# Patient Record
Sex: FEMALE | Race: AMERICAN INDIAN OR ALASKA NATIVE | Employment: OTHER | URBAN - METROPOLITAN AREA
[De-identification: names, ages, dates, MRNs, and addresses within clinical notes are randomized per-mention and may not be internally consistent; named-entity substitution may affect disease eponyms.]

---

## 2017-01-23 ENCOUNTER — HOSPITAL ENCOUNTER (OUTPATIENT)
Dept: CT IMAGING | Age: 72
Discharge: HOME | End: 2017-01-23
Attending: OBSTETRICS & GYNECOLOGY
Payer: MEDICARE

## 2017-01-23 ENCOUNTER — HOSPITAL ENCOUNTER (OUTPATIENT)
Dept: CT IMAGING | Age: 72
Discharge: HOME | End: 2017-01-23
Attending: INTERNAL MEDICINE
Payer: MEDICARE

## 2017-01-23 DIAGNOSIS — C51.9 VULVAR CANCER, CARCINOMA (HCC): ICD-10-CM

## 2017-01-23 DIAGNOSIS — Z12.2 ENCOUNTER FOR SCREENING FOR LUNG CANCER: ICD-10-CM

## 2017-01-23 LAB — CREAT BLD-MCNC: 0.8 MG/DL (ref 0.6–1)

## 2017-01-23 PROCEDURE — G0297 LDCT FOR LUNG CA SCREEN: HCPCS

## 2017-01-23 PROCEDURE — 74011000258 HC RX REV CODE- 258: Performed by: OBSTETRICS & GYNECOLOGY

## 2017-01-23 PROCEDURE — 74177 CT ABD & PELVIS W/CONTRAST: CPT

## 2017-01-23 PROCEDURE — 74011636320 HC RX REV CODE- 636/320: Performed by: OBSTETRICS & GYNECOLOGY

## 2017-01-23 PROCEDURE — 82565 ASSAY OF CREATININE: CPT

## 2017-01-23 RX ORDER — SODIUM CHLORIDE 0.9 % (FLUSH) 0.9 %
10 SYRINGE (ML) INJECTION
Status: COMPLETED | OUTPATIENT
Start: 2017-01-23 | End: 2017-01-23

## 2017-01-23 RX ADMIN — DIATRIZOATE MEGLUMINE AND DIATRIZOATE SODIUM 15 ML: 660; 100 LIQUID ORAL; RECTAL at 11:08

## 2017-01-23 RX ADMIN — Medication 10 ML: at 11:08

## 2017-01-23 RX ADMIN — SODIUM CHLORIDE 100 ML: 900 INJECTION, SOLUTION INTRAVENOUS at 11:08

## 2017-01-23 RX ADMIN — IOPAMIDOL 100 ML: 755 INJECTION, SOLUTION INTRAVENOUS at 11:08

## 2017-10-04 PROBLEM — E11.42 DIABETIC POLYNEUROPATHY ASSOCIATED WITH TYPE 2 DIABETES MELLITUS (HCC): Status: ACTIVE | Noted: 2017-10-04

## 2018-01-26 ENCOUNTER — HOSPITAL ENCOUNTER (OUTPATIENT)
Dept: MAMMOGRAPHY | Age: 73
Discharge: HOME | End: 2018-01-26
Attending: INTERNAL MEDICINE
Payer: MEDICARE

## 2018-01-26 DIAGNOSIS — Z78.0 MENOPAUSE: ICD-10-CM

## 2018-01-26 DIAGNOSIS — Z12.31 ENCOUNTER FOR SCREENING MAMMOGRAM FOR BREAST CANCER: ICD-10-CM

## 2018-01-26 PROCEDURE — 77067 SCR MAMMO BI INCL CAD: CPT

## 2018-01-26 PROCEDURE — 77080 DXA BONE DENSITY AXIAL: CPT

## 2018-02-23 ENCOUNTER — HOSPITAL ENCOUNTER (OUTPATIENT)
Dept: LAB | Age: 73
Discharge: HOME | End: 2018-02-23

## 2018-03-08 ENCOUNTER — HOSPITAL ENCOUNTER (OUTPATIENT)
Dept: CT IMAGING | Age: 73
Discharge: HOME | End: 2018-03-08
Attending: INTERNAL MEDICINE
Payer: MEDICARE

## 2018-03-08 VITALS — BODY MASS INDEX: 31.41 KG/M2 | WEIGHT: 160 LBS | HEIGHT: 60 IN

## 2018-03-08 DIAGNOSIS — Z12.2 ENCOUNTER FOR SCREENING FOR LUNG CANCER: ICD-10-CM

## 2018-03-08 PROCEDURE — G0297 LDCT FOR LUNG CA SCREEN: HCPCS

## 2018-06-20 PROBLEM — E66.9 CLASS 1 OBESITY WITH BODY MASS INDEX (BMI) OF 32.0 TO 32.9 IN ADULT: Status: ACTIVE | Noted: 2018-06-20

## 2018-10-26 ENCOUNTER — HOSPITAL ENCOUNTER (OUTPATIENT)
Dept: LAB | Age: 73
Discharge: HOME | End: 2018-10-26

## 2019-04-29 ENCOUNTER — HOSPITAL ENCOUNTER (OUTPATIENT)
Dept: CT IMAGING | Age: 74
Discharge: HOME | End: 2019-04-29
Attending: INTERNAL MEDICINE
Payer: MEDICARE

## 2019-04-29 VITALS — WEIGHT: 160 LBS | BODY MASS INDEX: 32.25 KG/M2 | HEIGHT: 59 IN

## 2019-04-29 DIAGNOSIS — F17.200 CURRENT SMOKER: ICD-10-CM

## 2019-04-29 PROCEDURE — G0297 LDCT FOR LUNG CA SCREEN: HCPCS

## 2019-05-16 PROBLEM — I65.21 CAROTID STENOSIS, RIGHT: Status: ACTIVE | Noted: 2019-05-16

## 2019-05-20 ENCOUNTER — HOSPITAL ENCOUNTER (OUTPATIENT)
Dept: CT IMAGING | Age: 74
Discharge: HOME | End: 2019-05-20
Attending: SURGERY
Payer: MEDICARE

## 2019-05-20 DIAGNOSIS — I72.0 CAROTID ANEURYSM, RIGHT (HCC): ICD-10-CM

## 2019-05-20 LAB — CREAT BLD-MCNC: 0.7 MG/DL (ref 0.8–1.5)

## 2019-05-20 PROCEDURE — 74011636320 HC RX REV CODE- 636/320: Performed by: SURGERY

## 2019-05-20 PROCEDURE — 70498 CT ANGIOGRAPHY NECK: CPT

## 2019-05-20 PROCEDURE — 74011000258 HC RX REV CODE- 258: Performed by: SURGERY

## 2019-05-20 PROCEDURE — 82565 ASSAY OF CREATININE: CPT

## 2019-05-20 RX ORDER — SODIUM CHLORIDE 0.9 % (FLUSH) 0.9 %
10 SYRINGE (ML) INJECTION
Status: COMPLETED | OUTPATIENT
Start: 2019-05-20 | End: 2019-05-20

## 2019-05-20 RX ADMIN — IOPAMIDOL 70 ML: 755 INJECTION, SOLUTION INTRAVENOUS at 09:57

## 2019-05-20 RX ADMIN — Medication 10 ML: at 09:57

## 2019-05-20 RX ADMIN — SODIUM CHLORIDE 100 ML: 900 INJECTION, SOLUTION INTRAVENOUS at 09:57

## 2019-06-07 ENCOUNTER — HOSPITAL ENCOUNTER (OUTPATIENT)
Dept: SURGERY | Age: 74
Discharge: HOME | End: 2019-06-07
Payer: MEDICARE

## 2019-06-07 ENCOUNTER — ANESTHESIA EVENT (OUTPATIENT)
Dept: SURGERY | Age: 74
DRG: 039 | End: 2019-06-07
Payer: MEDICARE

## 2019-06-07 VITALS
TEMPERATURE: 97.7 F | HEART RATE: 87 BPM | WEIGHT: 160 LBS | OXYGEN SATURATION: 97 % | BODY MASS INDEX: 32.25 KG/M2 | HEIGHT: 59 IN | RESPIRATION RATE: 18 BRPM | DIASTOLIC BLOOD PRESSURE: 57 MMHG | SYSTOLIC BLOOD PRESSURE: 116 MMHG

## 2019-06-07 LAB
ANION GAP SERPL CALC-SCNC: 7 MMOL/L (ref 7–16)
ATRIAL RATE: 74 BPM
BUN SERPL-MCNC: 18 MG/DL (ref 8–23)
CALCIUM SERPL-MCNC: 9.6 MG/DL (ref 8.3–10.4)
CALCULATED P AXIS, ECG09: 51 DEGREES
CALCULATED R AXIS, ECG10: 59 DEGREES
CALCULATED T AXIS, ECG11: 32 DEGREES
CHLORIDE SERPL-SCNC: 98 MMOL/L (ref 98–107)
CO2 SERPL-SCNC: 28 MMOL/L (ref 21–32)
CREAT SERPL-MCNC: 0.82 MG/DL (ref 0.6–1)
DIAGNOSIS, 93000: NORMAL
ERYTHROCYTE [DISTWIDTH] IN BLOOD BY AUTOMATED COUNT: 13.7 % (ref 11.9–14.6)
GLUCOSE BLD STRIP.AUTO-MCNC: 158 MG/DL (ref 65–100)
GLUCOSE SERPL-MCNC: 146 MG/DL (ref 65–100)
HCT VFR BLD AUTO: 43.4 % (ref 35.8–46.3)
HGB BLD-MCNC: 14.3 G/DL (ref 11.7–15.4)
MCH RBC QN AUTO: 31.8 PG (ref 26.1–32.9)
MCHC RBC AUTO-ENTMCNC: 32.9 G/DL (ref 31.4–35)
MCV RBC AUTO: 96.4 FL (ref 79.6–97.8)
NRBC # BLD: 0 K/UL (ref 0–0.2)
P-R INTERVAL, ECG05: 152 MS
PLATELET # BLD AUTO: 308 K/UL (ref 150–450)
PMV BLD AUTO: 10.9 FL (ref 9.4–12.3)
POTASSIUM SERPL-SCNC: 4.1 MMOL/L (ref 3.5–5.1)
Q-T INTERVAL, ECG07: 392 MS
QRS DURATION, ECG06: 68 MS
QTC CALCULATION (BEZET), ECG08: 435 MS
RBC # BLD AUTO: 4.5 M/UL (ref 4.05–5.2)
SODIUM SERPL-SCNC: 133 MMOL/L (ref 136–145)
VENTRICULAR RATE, ECG03: 74 BPM
WBC # BLD AUTO: 10.2 K/UL (ref 4.3–11.1)

## 2019-06-07 PROCEDURE — 93005 ELECTROCARDIOGRAM TRACING: CPT | Performed by: ANESTHESIOLOGY

## 2019-06-07 PROCEDURE — 85027 COMPLETE CBC AUTOMATED: CPT

## 2019-06-07 PROCEDURE — 82962 GLUCOSE BLOOD TEST: CPT

## 2019-06-07 PROCEDURE — 80048 BASIC METABOLIC PNL TOTAL CA: CPT

## 2019-06-07 RX ORDER — ALBUTEROL SULFATE 90 UG/1
1 AEROSOL, METERED RESPIRATORY (INHALATION)
COMMUNITY
End: 2020-01-20 | Stop reason: SDUPTHER

## 2019-06-07 RX ORDER — ASPIRIN 81 MG/1
81 TABLET ORAL DAILY
COMMUNITY

## 2019-06-07 NOTE — ANESTHESIA PREPROCEDURE EVALUATION
Relevant Problems   No relevant active problems   Anesthetic History   No history of anesthetic complications            Review of Systems / Medical History  Patient summary reviewed and pertinent labs reviewed    Pulmonary    COPD: mild      Smoker         Neuro/Psych   Within defined limits           Cardiovascular    Hypertension: well controlled              Exercise tolerance: <4 METS     GI/Hepatic/Renal     GERD: well controlled           Endo/Other    Diabetes: well controlled, type 2    Morbid obesity     Other Findings              Physical Exam    Airway  Mallampati: II  TM Distance: > 6 cm  Neck ROM: normal range of motion   Mouth opening: Normal     Cardiovascular    Rhythm: regular           Dental  No notable dental hx       Pulmonary                 Abdominal  GI exam deferred       Other Findings            Anesthetic Plan    ASA: 3  Anesthesia type: general          Induction: Intravenous  Anesthetic plan and risks discussed with: Patient                Anesthetic History   No history of anesthetic complications            Review of Systems / Medical History  Patient summary reviewed, nursing notes reviewed and pertinent labs reviewed    Pulmonary    COPD: severe      Smoker         Neuro/Psych   Within defined limits           Cardiovascular    Hypertension: well controlled          PAD (R carotid stenosis)    Exercise tolerance[de-identified] ~4METS     GI/Hepatic/Renal     GERD: well controlled           Endo/Other    Diabetes: type 2    Obesity     Other Findings            Physical Exam    Airway  Mallampati: II  TM Distance: 4 - 6 cm  Neck ROM: decreased range of motion, short neck   Mouth opening: Normal     Cardiovascular    Rhythm: regular  Rate: normal      Pertinent negatives: No murmur   Dental  No notable dental hx       Pulmonary  Breath sounds clear to auscultation               Abdominal         Other Findings            Anesthetic Plan    ASA: 3  Anesthesia type: general    Monitoring Plan: Arterial line      Induction: Intravenous  Anesthetic plan and risks discussed with: Patient

## 2019-06-07 NOTE — PERIOP NOTES
Patient verified name and . Patient provided medical/health information and PTA medications to the best of their ability. TYPE  CASE:2  Order for consent yes found in EHR and matches case posting. Labs per surgeon:cbc,bmp. Results: -  Labs per anesthesia protocol: poc glucose,type and screen-dos. Results -158  EKG  :  yes    Patient provided with and instructed on education handouts including Guide to Surgery, blood transfusions, pain management, and hand hygiene for the family and community, and [facility] brochure.     hibiclens and instructions given per hospital policy. Instructed patient to continue previous medications as prescribed prior to surgery unless otherwise directed and to take the following medications the day of surgery according to anesthesia guidelines : zantac,verapamil,aspirin,albuterol inhaler,clonidine if normally taking in am .  Instructed patient to hold  the following medications: vit c,vit d,vit b-12. Original medication prescription bottles none visualized during patient appointment. Patient teach back successful and patient demonstrates knowledge of instruction.

## 2019-06-13 ENCOUNTER — HOSPITAL ENCOUNTER (INPATIENT)
Age: 74
LOS: 1 days | Discharge: HOME | DRG: 039 | End: 2019-06-14
Attending: SURGERY | Admitting: SURGERY
Payer: MEDICARE

## 2019-06-13 ENCOUNTER — ANESTHESIA (OUTPATIENT)
Dept: SURGERY | Age: 74
DRG: 039 | End: 2019-06-13
Payer: MEDICARE

## 2019-06-13 DIAGNOSIS — I65.21 CAROTID STENOSIS, RIGHT: Primary | ICD-10-CM

## 2019-06-13 LAB
ABO + RH BLD: NORMAL
ACT BLD: 136 SECS (ref 70–128)
BLOOD GROUP ANTIBODIES SERPL: NORMAL
GLUCOSE BLD STRIP.AUTO-MCNC: 190 MG/DL (ref 65–100)
GLUCOSE BLD STRIP.AUTO-MCNC: 222 MG/DL (ref 65–100)
GLUCOSE BLD STRIP.AUTO-MCNC: 240 MG/DL (ref 65–100)
SPECIMEN EXP DATE BLD: NORMAL

## 2019-06-13 PROCEDURE — 77030020263 HC SOL INJ SOD CL0.9% LFCR 1000ML

## 2019-06-13 PROCEDURE — 03CM0ZZ EXTIRPATION OF MATTER FROM RIGHT EXTERNAL CAROTID ARTERY, OPEN APPROACH: ICD-10-PCS | Performed by: SURGERY

## 2019-06-13 PROCEDURE — 85347 COAGULATION TIME ACTIVATED: CPT

## 2019-06-13 PROCEDURE — 77030018836 HC SOL IRR NACL ICUM -A: Performed by: SURGERY

## 2019-06-13 PROCEDURE — 77030039425 HC BLD LARYNG TRULITE DISP TELE -A: Performed by: ANESTHESIOLOGY

## 2019-06-13 PROCEDURE — 03UK0KZ SUPPLEMENT RIGHT INTERNAL CAROTID ARTERY WITH NONAUTOLOGOUS TISSUE SUBSTITUTE, OPEN APPROACH: ICD-10-PCS | Performed by: SURGERY

## 2019-06-13 PROCEDURE — 74011000258 HC RX REV CODE- 258

## 2019-06-13 PROCEDURE — 76210000006 HC OR PH I REC 0.5 TO 1 HR: Performed by: SURGERY

## 2019-06-13 PROCEDURE — 94760 N-INVAS EAR/PLS OXIMETRY 1: CPT

## 2019-06-13 PROCEDURE — 94640 AIRWAY INHALATION TREATMENT: CPT

## 2019-06-13 PROCEDURE — 77030002986 HC SUT PROL J&J -A: Performed by: SURGERY

## 2019-06-13 PROCEDURE — 74011250637 HC RX REV CODE- 250/637: Performed by: SURGERY

## 2019-06-13 PROCEDURE — 76060000037 HC ANESTHESIA 3 TO 3.5 HR: Performed by: SURGERY

## 2019-06-13 PROCEDURE — 74011250636 HC RX REV CODE- 250/636: Performed by: SURGERY

## 2019-06-13 PROCEDURE — 77030032490 HC SLV COMPR SCD KNE COVD -B: Performed by: SURGERY

## 2019-06-13 PROCEDURE — 76010000207 HC CV SURG 2.5 TO 3 HR INTENSV-TIER 1: Performed by: SURGERY

## 2019-06-13 PROCEDURE — 77030018824 HC SHNT CAR ARGY COVD -B: Performed by: SURGERY

## 2019-06-13 PROCEDURE — 74011636637 HC RX REV CODE- 636/637: Performed by: ANESTHESIOLOGY

## 2019-06-13 PROCEDURE — 82962 GLUCOSE BLOOD TEST: CPT

## 2019-06-13 PROCEDURE — 74011250636 HC RX REV CODE- 250/636: Performed by: ANESTHESIOLOGY

## 2019-06-13 PROCEDURE — 74011250636 HC RX REV CODE- 250/636

## 2019-06-13 PROCEDURE — 77030010512 HC APPL CLP LIG J&J -C: Performed by: SURGERY

## 2019-06-13 PROCEDURE — 77030020407 HC IV BLD WRMR ST 3M -A: Performed by: ANESTHESIOLOGY

## 2019-06-13 PROCEDURE — 77030013292 HC BOWL MX PRSM J&J -A: Performed by: ANESTHESIOLOGY

## 2019-06-13 PROCEDURE — 77030016570 HC BLNKT BAIR HGGR 3M -B: Performed by: ANESTHESIOLOGY

## 2019-06-13 PROCEDURE — 77030019908 HC STETH ESOPH SIMS -A: Performed by: ANESTHESIOLOGY

## 2019-06-13 PROCEDURE — 77030039266 HC ADH SKN EXOFIN S2SG -A: Performed by: SURGERY

## 2019-06-13 PROCEDURE — 77030037088 HC TUBE ENDOTRACH ORAL NSL COVD-A: Performed by: ANESTHESIOLOGY

## 2019-06-13 PROCEDURE — 65610000001 HC ROOM ICU GENERAL

## 2019-06-13 PROCEDURE — 74011000250 HC RX REV CODE- 250: Performed by: SURGERY

## 2019-06-13 PROCEDURE — 74011250637 HC RX REV CODE- 250/637: Performed by: ANESTHESIOLOGY

## 2019-06-13 PROCEDURE — 77030014008 HC SPNG HEMSTAT J&J -C: Performed by: SURGERY

## 2019-06-13 PROCEDURE — 77030002933 HC SUT MCRYL J&J -A: Performed by: SURGERY

## 2019-06-13 PROCEDURE — 77030013794 HC KT TRNSDUC BLD EDWD -B: Performed by: ANESTHESIOLOGY

## 2019-06-13 PROCEDURE — 77030005401 HC CATH RAD ARRO -A: Performed by: ANESTHESIOLOGY

## 2019-06-13 PROCEDURE — 77030031139 HC SUT VCRL2 J&J -A: Performed by: SURGERY

## 2019-06-13 PROCEDURE — 77010033678 HC OXYGEN DAILY

## 2019-06-13 PROCEDURE — 77030018673: Performed by: SURGERY

## 2019-06-13 PROCEDURE — 03CK0ZZ EXTIRPATION OF MATTER FROM RIGHT INTERNAL CAROTID ARTERY, OPEN APPROACH: ICD-10-PCS | Performed by: SURGERY

## 2019-06-13 PROCEDURE — 77030002996 HC SUT SLK J&J -A: Performed by: SURGERY

## 2019-06-13 PROCEDURE — 74011000250 HC RX REV CODE- 250

## 2019-06-13 PROCEDURE — 03CH0ZZ EXTIRPATION OF MATTER FROM RIGHT COMMON CAROTID ARTERY, OPEN APPROACH: ICD-10-PCS | Performed by: SURGERY

## 2019-06-13 PROCEDURE — 77030034888 HC SUT PROL 2 J&J -B: Performed by: SURGERY

## 2019-06-13 PROCEDURE — 86900 BLOOD TYPING SEROLOGIC ABO: CPT

## 2019-06-13 PROCEDURE — C1768 GRAFT, VASCULAR: HCPCS | Performed by: SURGERY

## 2019-06-13 DEVICE — XENOSURE BIOLOGIC PATCH, 0.8CM X 8CM, EIFU
Type: IMPLANTABLE DEVICE | Site: CAROTID | Status: FUNCTIONAL
Brand: XENOSURE BIOLOGIC PATCH

## 2019-06-13 RX ORDER — ALBUTEROL SULFATE 0.83 MG/ML
2.5 SOLUTION RESPIRATORY (INHALATION)
Status: DISCONTINUED | OUTPATIENT
Start: 2019-06-13 | End: 2019-06-14 | Stop reason: HOSPADM

## 2019-06-13 RX ORDER — ONDANSETRON 2 MG/ML
4 INJECTION INTRAMUSCULAR; INTRAVENOUS
Status: DISCONTINUED | OUTPATIENT
Start: 2019-06-13 | End: 2019-06-14 | Stop reason: HOSPADM

## 2019-06-13 RX ORDER — HYDROCODONE BITARTRATE AND ACETAMINOPHEN 5; 325 MG/1; MG/1
1 TABLET ORAL
Status: DISCONTINUED | OUTPATIENT
Start: 2019-06-13 | End: 2019-06-14 | Stop reason: HOSPADM

## 2019-06-13 RX ORDER — GLYCOPYRROLATE 0.2 MG/ML
INJECTION INTRAMUSCULAR; INTRAVENOUS AS NEEDED
Status: DISCONTINUED | OUTPATIENT
Start: 2019-06-13 | End: 2019-06-13 | Stop reason: HOSPADM

## 2019-06-13 RX ORDER — SODIUM CHLORIDE 0.9 % (FLUSH) 0.9 %
5-40 SYRINGE (ML) INJECTION AS NEEDED
Status: DISCONTINUED | OUTPATIENT
Start: 2019-06-13 | End: 2019-06-13 | Stop reason: HOSPADM

## 2019-06-13 RX ORDER — VECURONIUM BROMIDE FOR INJECTION 1 MG/ML
INJECTION, POWDER, LYOPHILIZED, FOR SOLUTION INTRAVENOUS AS NEEDED
Status: DISCONTINUED | OUTPATIENT
Start: 2019-06-13 | End: 2019-06-13 | Stop reason: HOSPADM

## 2019-06-13 RX ORDER — LIDOCAINE HYDROCHLORIDE 10 MG/ML
INJECTION INFILTRATION; PERINEURAL AS NEEDED
Status: DISCONTINUED | OUTPATIENT
Start: 2019-06-13 | End: 2019-06-13 | Stop reason: HOSPADM

## 2019-06-13 RX ORDER — SUCCINYLCHOLINE CHLORIDE 20 MG/ML
INJECTION INTRAMUSCULAR; INTRAVENOUS AS NEEDED
Status: DISCONTINUED | OUTPATIENT
Start: 2019-06-13 | End: 2019-06-13 | Stop reason: HOSPADM

## 2019-06-13 RX ORDER — EPHEDRINE SULFATE 50 MG/ML
INJECTION, SOLUTION INTRAVENOUS AS NEEDED
Status: DISCONTINUED | OUTPATIENT
Start: 2019-06-13 | End: 2019-06-13 | Stop reason: HOSPADM

## 2019-06-13 RX ORDER — SODIUM CHLORIDE 9 MG/ML
75 INJECTION, SOLUTION INTRAVENOUS CONTINUOUS
Status: DISCONTINUED | OUTPATIENT
Start: 2019-06-13 | End: 2019-06-14 | Stop reason: HOSPADM

## 2019-06-13 RX ORDER — NEOSTIGMINE METHYLSULFATE 1 MG/ML
INJECTION INTRAVENOUS AS NEEDED
Status: DISCONTINUED | OUTPATIENT
Start: 2019-06-13 | End: 2019-06-13 | Stop reason: HOSPADM

## 2019-06-13 RX ORDER — TRIAMTERENE/HYDROCHLOROTHIAZID 37.5-25 MG
1 TABLET ORAL
Status: DISCONTINUED | OUTPATIENT
Start: 2019-06-13 | End: 2019-06-14 | Stop reason: HOSPADM

## 2019-06-13 RX ORDER — ACETAMINOPHEN 10 MG/ML
1000 INJECTION, SOLUTION INTRAVENOUS ONCE
Status: COMPLETED | OUTPATIENT
Start: 2019-06-13 | End: 2019-06-13

## 2019-06-13 RX ORDER — HEPARIN SODIUM 5000 [USP'U]/ML
INJECTION, SOLUTION INTRAVENOUS; SUBCUTANEOUS AS NEEDED
Status: DISCONTINUED | OUTPATIENT
Start: 2019-06-13 | End: 2019-06-13 | Stop reason: HOSPADM

## 2019-06-13 RX ORDER — SODIUM CHLORIDE, SODIUM LACTATE, POTASSIUM CHLORIDE, CALCIUM CHLORIDE 600; 310; 30; 20 MG/100ML; MG/100ML; MG/100ML; MG/100ML
75 INJECTION, SOLUTION INTRAVENOUS CONTINUOUS
Status: DISCONTINUED | OUTPATIENT
Start: 2019-06-13 | End: 2019-06-13

## 2019-06-13 RX ORDER — CEFAZOLIN SODIUM/WATER 2 G/20 ML
2 SYRINGE (ML) INTRAVENOUS EVERY 8 HOURS
Status: COMPLETED | OUTPATIENT
Start: 2019-06-13 | End: 2019-06-13

## 2019-06-13 RX ORDER — ROCURONIUM BROMIDE 10 MG/ML
INJECTION, SOLUTION INTRAVENOUS AS NEEDED
Status: DISCONTINUED | OUTPATIENT
Start: 2019-06-13 | End: 2019-06-13 | Stop reason: HOSPADM

## 2019-06-13 RX ORDER — MORPHINE SULFATE 2 MG/ML
2 INJECTION, SOLUTION INTRAMUSCULAR; INTRAVENOUS
Status: DISCONTINUED | OUTPATIENT
Start: 2019-06-13 | End: 2019-06-14 | Stop reason: HOSPADM

## 2019-06-13 RX ORDER — SODIUM CHLORIDE 0.9 % (FLUSH) 0.9 %
5-40 SYRINGE (ML) INJECTION AS NEEDED
Status: DISCONTINUED | OUTPATIENT
Start: 2019-06-13 | End: 2019-06-14 | Stop reason: HOSPADM

## 2019-06-13 RX ORDER — SODIUM CHLORIDE 0.9 % (FLUSH) 0.9 %
5-40 SYRINGE (ML) INJECTION EVERY 8 HOURS
Status: DISCONTINUED | OUTPATIENT
Start: 2019-06-13 | End: 2019-06-14 | Stop reason: HOSPADM

## 2019-06-13 RX ORDER — GLIPIZIDE 5 MG/1
5 TABLET ORAL 2 TIMES DAILY
Status: DISCONTINUED | OUTPATIENT
Start: 2019-06-13 | End: 2019-06-14 | Stop reason: HOSPADM

## 2019-06-13 RX ORDER — ONDANSETRON 2 MG/ML
INJECTION INTRAMUSCULAR; INTRAVENOUS AS NEEDED
Status: DISCONTINUED | OUTPATIENT
Start: 2019-06-13 | End: 2019-06-13 | Stop reason: HOSPADM

## 2019-06-13 RX ORDER — BUPIVACAINE HYDROCHLORIDE 2.5 MG/ML
INJECTION, SOLUTION EPIDURAL; INFILTRATION; INTRACAUDAL AS NEEDED
Status: DISCONTINUED | OUTPATIENT
Start: 2019-06-13 | End: 2019-06-13 | Stop reason: HOSPADM

## 2019-06-13 RX ORDER — OXYCODONE HYDROCHLORIDE 5 MG/1
10 TABLET ORAL
Status: DISCONTINUED | OUTPATIENT
Start: 2019-06-13 | End: 2019-06-13

## 2019-06-13 RX ORDER — LIDOCAINE HYDROCHLORIDE 10 MG/ML
0.1 INJECTION INFILTRATION; PERINEURAL AS NEEDED
Status: DISCONTINUED | OUTPATIENT
Start: 2019-06-13 | End: 2019-06-13 | Stop reason: HOSPADM

## 2019-06-13 RX ORDER — MIDAZOLAM HYDROCHLORIDE 1 MG/ML
2 INJECTION, SOLUTION INTRAMUSCULAR; INTRAVENOUS ONCE
Status: DISCONTINUED | OUTPATIENT
Start: 2019-06-13 | End: 2019-06-13 | Stop reason: HOSPADM

## 2019-06-13 RX ORDER — LIDOCAINE HYDROCHLORIDE 20 MG/ML
INJECTION, SOLUTION EPIDURAL; INFILTRATION; INTRACAUDAL; PERINEURAL AS NEEDED
Status: DISCONTINUED | OUTPATIENT
Start: 2019-06-13 | End: 2019-06-13 | Stop reason: HOSPADM

## 2019-06-13 RX ORDER — FENTANYL CITRATE 50 UG/ML
INJECTION, SOLUTION INTRAMUSCULAR; INTRAVENOUS AS NEEDED
Status: DISCONTINUED | OUTPATIENT
Start: 2019-06-13 | End: 2019-06-13 | Stop reason: HOSPADM

## 2019-06-13 RX ORDER — VERAPAMIL HYDROCHLORIDE 180 MG/1
180 TABLET, EXTENDED RELEASE ORAL
Status: DISCONTINUED | OUTPATIENT
Start: 2019-06-14 | End: 2019-06-14 | Stop reason: HOSPADM

## 2019-06-13 RX ORDER — FENTANYL CITRATE 50 UG/ML
100 INJECTION, SOLUTION INTRAMUSCULAR; INTRAVENOUS ONCE
Status: DISCONTINUED | OUTPATIENT
Start: 2019-06-13 | End: 2019-06-13 | Stop reason: HOSPADM

## 2019-06-13 RX ORDER — HYDROMORPHONE HYDROCHLORIDE 2 MG/ML
0.5 INJECTION, SOLUTION INTRAMUSCULAR; INTRAVENOUS; SUBCUTANEOUS
Status: DISCONTINUED | OUTPATIENT
Start: 2019-06-13 | End: 2019-06-13

## 2019-06-13 RX ORDER — ASPIRIN 81 MG/1
81 TABLET ORAL DAILY
Status: DISCONTINUED | OUTPATIENT
Start: 2019-06-13 | End: 2019-06-14 | Stop reason: HOSPADM

## 2019-06-13 RX ORDER — PROPOFOL 10 MG/ML
INJECTION, EMULSION INTRAVENOUS AS NEEDED
Status: DISCONTINUED | OUTPATIENT
Start: 2019-06-13 | End: 2019-06-13 | Stop reason: HOSPADM

## 2019-06-13 RX ORDER — FAMOTIDINE 20 MG/1
20 TABLET, FILM COATED ORAL ONCE
Status: COMPLETED | OUTPATIENT
Start: 2019-06-13 | End: 2019-06-13

## 2019-06-13 RX ORDER — CEFAZOLIN SODIUM/WATER 2 G/20 ML
2 SYRINGE (ML) INTRAVENOUS ONCE
Status: COMPLETED | OUTPATIENT
Start: 2019-06-13 | End: 2019-06-13

## 2019-06-13 RX ORDER — OXYCODONE HYDROCHLORIDE 5 MG/1
5 TABLET ORAL
Status: DISCONTINUED | OUTPATIENT
Start: 2019-06-13 | End: 2019-06-13

## 2019-06-13 RX ORDER — SODIUM CHLORIDE 0.9 % (FLUSH) 0.9 %
5-40 SYRINGE (ML) INJECTION EVERY 8 HOURS
Status: DISCONTINUED | OUTPATIENT
Start: 2019-06-13 | End: 2019-06-13 | Stop reason: HOSPADM

## 2019-06-13 RX ORDER — PROTAMINE SULFATE 10 MG/ML
INJECTION, SOLUTION INTRAVENOUS AS NEEDED
Status: DISCONTINUED | OUTPATIENT
Start: 2019-06-13 | End: 2019-06-13 | Stop reason: HOSPADM

## 2019-06-13 RX ORDER — SODIUM CHLORIDE, SODIUM LACTATE, POTASSIUM CHLORIDE, CALCIUM CHLORIDE 600; 310; 30; 20 MG/100ML; MG/100ML; MG/100ML; MG/100ML
75 INJECTION, SOLUTION INTRAVENOUS CONTINUOUS
Status: DISCONTINUED | OUTPATIENT
Start: 2019-06-13 | End: 2019-06-13 | Stop reason: HOSPADM

## 2019-06-13 RX ADMIN — PROPOFOL 50 MG: 10 INJECTION, EMULSION INTRAVENOUS at 07:56

## 2019-06-13 RX ADMIN — Medication 2 G: at 07:08

## 2019-06-13 RX ADMIN — PROPOFOL 150 MG: 10 INJECTION, EMULSION INTRAVENOUS at 07:30

## 2019-06-13 RX ADMIN — ACETAMINOPHEN 1000 MG: 10 INJECTION, SOLUTION INTRAVENOUS at 10:39

## 2019-06-13 RX ADMIN — EPHEDRINE SULFATE 10 MG: 50 INJECTION, SOLUTION INTRAVENOUS at 07:42

## 2019-06-13 RX ADMIN — INSULIN HUMAN 5 UNITS: 100 INJECTION, SOLUTION PARENTERAL at 10:09

## 2019-06-13 RX ADMIN — PROTAMINE SULFATE 5 MG: 10 INJECTION, SOLUTION INTRAVENOUS at 09:19

## 2019-06-13 RX ADMIN — INSULIN HUMAN 5 UNITS: 100 INJECTION, SOLUTION PARENTERAL at 07:00

## 2019-06-13 RX ADMIN — SODIUM CHLORIDE, SODIUM LACTATE, POTASSIUM CHLORIDE, AND CALCIUM CHLORIDE 75 ML/HR: 600; 310; 30; 20 INJECTION, SOLUTION INTRAVENOUS at 06:46

## 2019-06-13 RX ADMIN — NEOSTIGMINE METHYLSULFATE 4 MG: 1 INJECTION INTRAVENOUS at 09:39

## 2019-06-13 RX ADMIN — SUCCINYLCHOLINE CHLORIDE 140 MG: 20 INJECTION INTRAMUSCULAR; INTRAVENOUS at 07:30

## 2019-06-13 RX ADMIN — Medication 2 G: at 22:30

## 2019-06-13 RX ADMIN — HYDROCODONE BITARTRATE AND ACETAMINOPHEN 1 TABLET: 5; 325 TABLET ORAL at 18:26

## 2019-06-13 RX ADMIN — Medication 2 G: at 14:55

## 2019-06-13 RX ADMIN — ROCURONIUM BROMIDE 5 MG: 10 INJECTION, SOLUTION INTRAVENOUS at 07:30

## 2019-06-13 RX ADMIN — LIDOCAINE HYDROCHLORIDE 100 MG: 20 INJECTION, SOLUTION EPIDURAL; INFILTRATION; INTRACAUDAL; PERINEURAL at 07:30

## 2019-06-13 RX ADMIN — EPHEDRINE SULFATE 10 MG: 50 INJECTION, SOLUTION INTRAVENOUS at 07:46

## 2019-06-13 RX ADMIN — NEOSTIGMINE METHYLSULFATE 1 MG: 1 INJECTION INTRAVENOUS at 09:54

## 2019-06-13 RX ADMIN — PROTAMINE SULFATE 10 MG: 10 INJECTION, SOLUTION INTRAVENOUS at 09:18

## 2019-06-13 RX ADMIN — PROTAMINE SULFATE 5 MG: 10 INJECTION, SOLUTION INTRAVENOUS at 09:22

## 2019-06-13 RX ADMIN — PROTAMINE SULFATE 5 MG: 10 INJECTION, SOLUTION INTRAVENOUS at 09:17

## 2019-06-13 RX ADMIN — EPHEDRINE SULFATE 5 MG: 50 INJECTION, SOLUTION INTRAVENOUS at 08:35

## 2019-06-13 RX ADMIN — SODIUM CHLORIDE, SODIUM LACTATE, POTASSIUM CHLORIDE, AND CALCIUM CHLORIDE: 600; 310; 30; 20 INJECTION, SOLUTION INTRAVENOUS at 08:18

## 2019-06-13 RX ADMIN — GLIPIZIDE 5 MG: 5 TABLET ORAL at 12:40

## 2019-06-13 RX ADMIN — Medication 5 ML: at 14:00

## 2019-06-13 RX ADMIN — FENTANYL CITRATE 50 MCG: 50 INJECTION, SOLUTION INTRAMUSCULAR; INTRAVENOUS at 07:30

## 2019-06-13 RX ADMIN — ROCURONIUM BROMIDE 45 MG: 10 INJECTION, SOLUTION INTRAVENOUS at 07:38

## 2019-06-13 RX ADMIN — ALBUTEROL SULFATE 2.5 MG: 2.5 SOLUTION RESPIRATORY (INHALATION) at 23:54

## 2019-06-13 RX ADMIN — VECURONIUM BROMIDE FOR INJECTION 1 MG: 1 INJECTION, POWDER, LYOPHILIZED, FOR SOLUTION INTRAVENOUS at 09:22

## 2019-06-13 RX ADMIN — EPHEDRINE SULFATE 5 MG: 50 INJECTION, SOLUTION INTRAVENOUS at 08:33

## 2019-06-13 RX ADMIN — SODIUM CHLORIDE 75 ML/HR: 900 INJECTION, SOLUTION INTRAVENOUS at 20:36

## 2019-06-13 RX ADMIN — ALBUTEROL SULFATE 2.5 MG: 2.5 SOLUTION RESPIRATORY (INHALATION) at 20:00

## 2019-06-13 RX ADMIN — Medication 10 ML: at 21:39

## 2019-06-13 RX ADMIN — ONDANSETRON 4 MG: 2 INJECTION INTRAMUSCULAR; INTRAVENOUS at 09:52

## 2019-06-13 RX ADMIN — PROTAMINE SULFATE 20 MG: 10 INJECTION, SOLUTION INTRAVENOUS at 09:20

## 2019-06-13 RX ADMIN — ALBUTEROL SULFATE 2.5 MG: 2.5 SOLUTION RESPIRATORY (INHALATION) at 16:13

## 2019-06-13 RX ADMIN — PROTAMINE SULFATE 5 MG: 10 INJECTION, SOLUTION INTRAVENOUS at 09:21

## 2019-06-13 RX ADMIN — HEPARIN SODIUM 8000 UNITS: 5000 INJECTION, SOLUTION INTRAVENOUS; SUBCUTANEOUS at 08:25

## 2019-06-13 RX ADMIN — SODIUM CHLORIDE 75 ML/HR: 900 INJECTION, SOLUTION INTRAVENOUS at 11:00

## 2019-06-13 RX ADMIN — ASPIRIN 81 MG: 81 TABLET, COATED ORAL at 12:41

## 2019-06-13 RX ADMIN — FENTANYL CITRATE 25 MCG: 50 INJECTION, SOLUTION INTRAMUSCULAR; INTRAVENOUS at 07:14

## 2019-06-13 RX ADMIN — PROPOFOL 10 MG: 10 INJECTION, EMULSION INTRAVENOUS at 08:44

## 2019-06-13 RX ADMIN — GLYCOPYRROLATE 0.6 MG: 0.2 INJECTION INTRAMUSCULAR; INTRAVENOUS at 09:39

## 2019-06-13 RX ADMIN — ALBUTEROL SULFATE 2.5 MG: 2.5 SOLUTION RESPIRATORY (INHALATION) at 12:00

## 2019-06-13 RX ADMIN — FENTANYL CITRATE 25 MCG: 50 INJECTION, SOLUTION INTRAMUSCULAR; INTRAVENOUS at 07:12

## 2019-06-13 RX ADMIN — VECURONIUM BROMIDE FOR INJECTION 1 MG: 1 INJECTION, POWDER, LYOPHILIZED, FOR SOLUTION INTRAVENOUS at 08:28

## 2019-06-13 RX ADMIN — FAMOTIDINE 20 MG: 20 TABLET ORAL at 06:46

## 2019-06-13 RX ADMIN — GLIPIZIDE 5 MG: 5 TABLET ORAL at 17:17

## 2019-06-13 NOTE — PERIOP NOTES
TRANSFER - OUT REPORT:    [facility] Roseann [facility]ing  being transferred to [employer] [facility] [employer] [phone] (unit) for routine post - op       Report consisted of patients Situation, Background, Assessment and   Recommendations(SBAR). Information from the following report(s) SBAR, OR Summary and Procedure Summary was reviewed with the receiving nurse. Lines:   Peripheral IV 06/13/19 Left Wrist (Active)   Site Assessment Clean, dry, & intact 6/13/2019 10:37 AM   Phlebitis Assessment 0 6/13/2019 10:37 AM   Infiltration Assessment 0 6/13/2019 10:37 AM   Dressing Status Clean, dry, & intact 6/13/2019 10:37 AM   Dressing Type Tape;Transparent 6/13/2019 10:37 AM   Hub Color/Line Status Green;Capped 6/13/2019 10:37 AM       Peripheral IV 06/13/19 Right Hand (Active)   Site Assessment Clean, dry, & intact 6/13/2019 10:37 AM   Phlebitis Assessment 0 6/13/2019 10:37 AM   Infiltration Assessment 0 6/13/2019 10:37 AM   Dressing Status Clean, dry, & intact 6/13/2019 10:37 AM   Dressing Type Tape;Transparent 6/13/2019 10:37 AM   Hub Color/Line Status Green; Infusing 6/13/2019 10:37 AM       Arterial Line 06/13/19 Right Radial artery (Active)   Site Assessment Clean, dry, & intact 6/13/2019 10:37 AM   Dressing Status Clean, dry, & intact 6/13/2019 10:37 AM   Dressing Type Tape;Transparent 6/13/2019 10:37 AM   Line Status Intact and in place 6/13/2019 10:37 AM   Treatment Arm board off 6/13/2019 10:37 AM        Opportunity for questions and clarification was provided. Patient transported with: RN, monitor,   O2 @ 4 liters    VTE prophylaxis orders have been written for Roseann Ying. Patient and family given floor number and nurses name. Family updated re: pt status after security code verified.

## 2019-06-13 NOTE — PROGRESS NOTES
Pt admitted to 3104 from PACU. Currently drowsy, opens eyes to voice. Oriented x4. Denies pain. Placed on 3 LNC. ART line to R radial, dampened waveform. Arm board applied, BP marginal, otherwise VSS. Purewick placed, has yet to void. Dual skin assessment completed with Cordell Hinojosa. No signs of pressure injury or breakdown noted. Allevyn applied to sacrum. R neck surgical incision with dermabond- CDI.

## 2019-06-13 NOTE — PROGRESS NOTES
Pt seen in ICU s/p Right carotid endarterectomy with bovine pericardial patch closure by Dr. Enrico Pemberton. Currently alert and oriented. Pt confirms demographics. Screened for any possible d/c needs. None voiced at present. CM will follow for any d/c needs per MD.     Care Management Interventions  PCP Verified by CM: Yes(confirms Dr. Josie Arellano)  Mode of Transport at Discharge:  Other (see comment)  Transition of Care Consult (CM Consult): Discharge Planning  Current Support Network: Own Home(confirms Carolyn Robledo, son as emergency contact -[phone])  Confirm Follow Up Transport: Self  Plan discussed with Pt/Family/Caregiver: Yes  Freedom of Choice Offered: Yes  [address] Provided?: (confirms [employer] - able to get rx)  Discharge Location  Discharge Placement: Home

## 2019-06-13 NOTE — OP NOTES
[facility]  OPERATIVE REPORT    Name:  William Nicholson  MR#:  [id number]  :  1945  ACCOUNT #:  [de-identified]  DATE OF SERVICE:  2019    CLINICAL SERVICE:  Vascular Surgery. PREOPERATIVE DIAGNOSIS:  Right carotid stenosis greater than 90%. POSTOPERATIVE DIAGNOSIS:  Right carotid stenosis greater than 90%. PROCEDURE PERFORMED:  Right carotid endarterectomy with bovine pericardial patch closure. SURGEON:  Rosalva Johnson. Jil Rios MD    ASSISTANT:      ANESTHESIA:  General.    COMPLICATIONS:  None. SPECIMENS REMOVED:  Right carotid plaque. IMPLANTS:      ESTIMATED BLOOD LOSS:      INDICATION FOR PROCEDURE:  A 71-year-old female who presented to my office with ultrasound findings of high-grade right carotid stenosis. This was confirmed with a CTA. I had a long discussion with the patient and family in detail and we elected to proceed. PROCEDURE IN DETAIL:  After getting informed consent, the patient was brought to the operating room and general anesthesia was then induced. Preop antibiotics were given before skin incision. The patient's right neck was then prepped and draped in normal sterile fashion. Incision was made over the anterior border of sternocleidomastoid. We dissected down with electrocautery through the subcutaneous tissue. Sternocleidomastoid was retracted laterally exposing the carotid sheath. The common carotid artery was dissected and controlled with Gustabo tourniquet. The vagus nerve was identified and kept out of harm's way. During the the distal part of dissection, I was able to identify the hypoglossal nerve and was kept out of harm's way. The superior thyroid and external thyroid was all controlled with Gustabo tourniquet. The ICA was controlled to normal-appearing intima with vessel loops. We heparinized the patient with 8000 units of heparin. We allowed it to circulate for 3 minutes.   We got proximal and distal control on ICA and external and common. We used an 11 blade to do an arteriotomy on the anteromedial aspect. We extended with Osman scissors due to heavily diseased proximal ICA plaque to normal-appearing intima. Then, I was able to put a #8 Argon shunt into the ICA and common. We checked it for Doppler signals. It was patent. We started our endarterectomy plane at the distal common with a Kingman elevator. We transected the plaque proximally. We did eversion technique to remove the plaque from the external.  We removed the rest of the posterior ICA plaque and feathered off leaving a smooth endpoint. All remaining fine debris was removed with fine forceps. It did require two posterior stitches on the posterior aspect. We then did a patch angioplasty using bovine pericardial patch closure using 6-0 Prolene proximally and distally. Prior to tying up, we removed the shunt. We back flushed the ICA and common. We restored flow first into the external and then into the ICA. We then reversed the patient with 50 mg of protamine after getting good Doppler signals of ICA, external and common. We closed the wound with 2-0 Vicryl, 3-0 Vicryl and 4-0 Monocryl. The patient was extubated and taken to the PACU in stable condition.       Helaine Meigs, MD DW/[email or web address]/[email or web address]_P  D:  06/13/2019 9:53  T:  06/13/2019 12:28  JOB #:  [id number]

## 2019-06-13 NOTE — PROGRESS NOTES
A follow up visit was made to the patient. Emotional support, spiritual presence and   prayer were provided for her and her son.       [employer]

## 2019-06-13 NOTE — PROGRESS NOTES
Spiritual Care visit. Initial Visit, Pre Surgery Consult. Patient left for surgery before  arrived.     Visit by Kenisha Calvillo M.Ed., Th.B. ,Staff

## 2019-06-13 NOTE — BRIEF OP NOTE
[address] [facility]. [address]  [phone] FAX: [phone]    Brief Op Note Template Note    Pre-Op Diagnosis: Carotid stenosis, right [I65.21]    Post-Op Diagnosis:  Carotid stenosis, right [I65.21]    Procedures: Procedure(s):  RIGHT CAROTID ARTERY ENDARTERECTOMY    Surgeon: Sohail Kolb MD    Assistants: Surgeon(s): Katya Caballero MD      Anesthesia:  General     Findings: Right carotid plaque    Tourniquet Time:  * No tourniquets in log *    Estimated Blood Loss:               Specimens: Same           Implants:    Implant Name Type Inv. Item Serial No.  Lot No. LRB No. Used [facility] VASC PERIPATCH 0.8X8CM [employer] - [id number]  PATCH VASC [address] 0.8X8CM -- Lorena Primrose Right 1 Implanted       Complications: None               Signed: Sohail Kolb MD      Elements of this note have been dictated using speech recognition software. As a result, errors of speech recognition may have occurred.

## 2019-06-13 NOTE — ANESTHESIA PROCEDURE NOTES
Arterial Line Placement    Start time: 6/13/2019 7:12 AM  End time: 6/13/2019 7:17 AM  Performed by: Sruthi Park CRNA  Authorized by: Sruthi Park CRNA     Pre-Procedure  Indications:  Arterial pressure monitoring and blood sampling  Preanesthetic Checklist: patient identified, risks and benefits discussed, anesthesia consent, site marked, patient being monitored, timeout performed and patient being monitored    Timeout Time: 07:11  Preanesthetic Checklist comment:  Good Allens test    Procedure:   Prep:  ChloraPrep  Seldinger Technique?: No    Orientation:  Right  Location:  Radial artery  Catheter size:  20 G  Number of attempts:  2  Cont Cardiac Output Sensor: No      Assessment:   Post-procedure:  Line secured and sterile dressing applied  Patient Tolerance:  Patient tolerated the procedure well with no immediate complications

## 2019-06-13 NOTE — PROGRESS NOTES
TRANSFER - IN REPORT:    Verbal report received from [facility]) on Kellie Anand  being received from [employer]) for routine post - op      Report consisted of patients Situation, Background, Assessment and   Recommendations(SBAR). Information from the following report(s) SBAR, Kardex, Procedure Summary, Intake/Output and MAR was reviewed with the receiving nurse. Opportunity for questions and clarification was provided. Assessment completed upon patients arrival to unit and care assumed.

## 2019-06-13 NOTE — ANESTHESIA PROCEDURE NOTES
Arterial Line Placement Performed by: Sruthi Park CRNA Authorized by: Sruthi Park CRNA Pre-Procedure

## 2019-06-14 VITALS
HEIGHT: 59 IN | BODY MASS INDEX: 35.78 KG/M2 | OXYGEN SATURATION: 93 % | SYSTOLIC BLOOD PRESSURE: 141 MMHG | TEMPERATURE: 97.2 F | RESPIRATION RATE: 20 BRPM | DIASTOLIC BLOOD PRESSURE: 62 MMHG | WEIGHT: 177.47 LBS | HEART RATE: 80 BPM

## 2019-06-14 PROCEDURE — 77010033678 HC OXYGEN DAILY

## 2019-06-14 PROCEDURE — 74011250637 HC RX REV CODE- 250/637: Performed by: SURGERY

## 2019-06-14 PROCEDURE — 94760 N-INVAS EAR/PLS OXIMETRY 1: CPT

## 2019-06-14 PROCEDURE — 74011000250 HC RX REV CODE- 250: Performed by: SURGERY

## 2019-06-14 PROCEDURE — 94640 AIRWAY INHALATION TREATMENT: CPT

## 2019-06-14 RX ORDER — HYDROCODONE BITARTRATE AND ACETAMINOPHEN 5; 325 MG/1; MG/1
1 TABLET ORAL
Qty: 30 TAB | Refills: 0 | Status: SHIPPED | OUTPATIENT
Start: 2019-06-14 | End: 2019-06-19

## 2019-06-14 RX ADMIN — ASPIRIN 81 MG: 81 TABLET, COATED ORAL at 08:12

## 2019-06-14 RX ADMIN — ALBUTEROL SULFATE 2.5 MG: 2.5 SOLUTION RESPIRATORY (INHALATION) at 04:27

## 2019-06-14 RX ADMIN — VERAPAMIL HYDROCHLORIDE 180 MG: 180 TABLET, FILM COATED, EXTENDED RELEASE ORAL at 07:22

## 2019-06-14 RX ADMIN — TRIAMTERENE AND HYDROCHLOROTHIAZIDE 1 TABLET: 37.5; 25 TABLET ORAL at 07:22

## 2019-06-14 RX ADMIN — Medication 10 ML: at 05:21

## 2019-06-14 RX ADMIN — ALBUTEROL SULFATE 2.5 MG: 2.5 SOLUTION RESPIRATORY (INHALATION) at 07:28

## 2019-06-14 RX ADMIN — GLIPIZIDE 5 MG: 5 TABLET ORAL at 08:12

## 2019-06-14 NOTE — PROGRESS NOTES
Bedside and Verbal shift change report given to Anthony Bass RN  (oncoming nurse) by Jax Weber RN  (offgoing nurse). Report included the following information SBAR, Kardex, ED Summary, OR Summary, Procedure Summary, Intake/Output, Recent Results, Cardiac Rhythm NSR  and Alarm Parameters . Dual skin assessment completed.

## 2019-06-14 NOTE — DISCHARGE INSTRUCTIONS
MD Instructions:  Wound care:  - Do not get incision wet until Monday, 6/17/2019.  - Starting Monday, wash neck wound daily with liquid Dial soap (or other liquid antibacterial soap); use fingers or soft washcloth gently then pat area dry. - Keep incision clean and dry, may cover with gauze. - Do not apply lotions, creams or ointments to incisions. - Tissue adhesive (\"skin glue\") used over incision will flake or peel off on its own. Diet:  - As tolerated before surgery. Activity:  - Don't overdo your activity throughout the day for the next 10-14 days - no prolonged standing, no lifting more than 10lb. - Keep legs propped up when not walking.  - No driving while taking narcotics. - Do not drink alcohol while taking narcotics. - Starting Monday, you may shower - no tub baths, soaking or swimming. Medications:  - Use prescription pain medications if needed; if you do not wish to use narcotic pain medication or require less pain control, you may take acetaminophen (Tylenol, for example) or naproxen (Aleve, for example) following instructions on the label.  - Resume other home medications.     Follow up in the office with Dr. Bob Ku on 6/25/2019 at 8:10 AM.    If problems or questions arise, please call our office at [phone].

## 2019-06-14 NOTE — INTERDISCIPLINARY ROUNDS
Interdisciplinary team rounds were held 6/14/2019 with the following team members:Care Management, Nursing, Nurse Practitioner, Nutrition, Palliative Care, Pastoral Care, Pharmacy, Physical Therapy, Physician, Respiratory Therapy and Clinical Coordinator and the patient. Plan of care discussed. See clinical pathway and/or care plan for interventions and desired outcomes.

## 2019-06-14 NOTE — PROGRESS NOTES
Pt discharge instructions printed and explained to pt. Pt verbalizes understanding. Follow up apt made and pt is aware of date and time.

## 2019-06-14 NOTE — CONSULTS
LEAPFROG PROTOCOL NOTE    Othel Pat  6/14/2019    The patient is currently in the critical care setting managed by Dr. Kirkwood Goldmann with right carotid endarterectomy. The patient's chart is reviewed and the patient is discussed with the staff. Patient is currently hemodynamically stable. Patient has no needs identified for Intensivist management in the critical care setting at this time. Please notify us if can be of assistance. No charge billed to the patient. Thank you.     Toshia Whaley NP

## 2019-06-14 NOTE — PROGRESS NOTES
Shift assessment completed. Patient sitting up in bed, alert and oriented x 4. Eyes open spontaneously, pupils equal and reactive, 3/brisk. VS stable with marginal blood pressure at times. HR 74-NSR. Lung sounds coarse throughout. Patient SOB with exertion, and with conversation. Placed on 3L per NC for O2 sat of 86%. Patient has strong, moist productive cough and reports thick/yellow sputum- not observed by this RN. Abdomen soft, non-tender with active bowel sounds. LBM 6/12 per patient report. Assisted patient to toilet, she voided 100ml cloudy/yellow urine. Palpated equal radial and pedal pulses bilaterally. Noted florentin-incisional swelling and bruising to right neck- incision well approximated and open to air, no drainage noted at this time. Patient currently denies pain. Right radial ART line asymptomatic, dressing C/D/I, and zeroed. No other needs voiced, bed low/locked, and call light within reach. Will cont to monitor.

## 2019-06-14 NOTE — PROGRESS NOTES
[address]   [address] [facility] FAX: [phone]         VASCULAR SURGERY FLOOR PROGRESS NOTE    Admit Date: 2019  POD: 1 Day Post-Op    Procedure:  Procedure(s):  RIGHT CAROTID ARTERY ENDARTERECTOMY    Subjective:     Patient has no new complaints. Objective:     Vitals:  Blood pressure 127/58, pulse 74, temperature 97 °F (36.1 °C), resp. rate 12, height 4' 11\" (1.499 m), weight 177 lb 7.5 oz (80.5 kg), SpO2 100 %. Temp (24hrs), Av.2 °F (36.2 °C), Min:96.2 °F (35.7 °C), Max:97.8 °F (36.6 °C)      Intake / Output:    Intake/Output Summary (Last 24 hours) at 2019 0703  Last data filed at 2019 0641  Gross per 24 hour   Intake 2713.5 ml   Output 1100 ml   Net 1613.5 ml       Physical Exam:    Constitutional: she appears well-developed. No distress. HENT:   Head: Atraumatic. Eyes: Pupils are equal, round, and reactive to light. Neck: Normal range of motion. Cardiovascular: Regular rhythm. Pulmonary/Chest: Effort normal and breath sounds normal. No respiratory distress. Abdominal: Soft. Bowel sounds are normal. she exhibits no distension. There is no tenderness. There is no guarding. No hernia. Musculoskeletal: Normal range of motion. Neurological: She is alert. CN II- XII grossly intact  Vascular: neruo intact    Labs: No results for input(s): HGB, WBC, K, GLU, HGBEXT in the last 72 hours.     No lab exists for component:  CREA    Data Review     Assessment:     Patient Active Problem List    Diagnosis Date Noted    Carotid stenosis, right 2019    Class 1 obesity with body mass index (BMI) of 32.0 to 32.9 in adult 2018    Diabetic polyneuropathy associated with type 2 diabetes mellitus ([facility] [address] 10/04/2017    Vitamin B12 deficiency 2016    Vitamin D deficiency 2015    Seroma, postoperative 2015    Primary vulvar cancer ([facility] [place] [address] 2015    Elevated liver enzymes 2013    Hyperlipidemia 2013  Obesity 02/12/2013    Smoking 02/12/2013    Iron deficiency anemia 02/12/2013    Anxiety 02/12/2013    Essential hypertension 01/16/2013    Chronic obstructive pulmonary disease ([address] 01/16/2013       Plan/Recommendations/Medical Decision Making:     D/c home follow up 2 weeks    Elements of this note have been dictated using speech recognition software. As a result, errors of speech recognition may have occurred.

## 2019-06-15 NOTE — ANESTHESIA POSTPROCEDURE EVALUATION
Procedure(s):  RIGHT CAROTID ARTERY ENDARTERECTOMY. general    Anesthesia Post Evaluation      Multimodal analgesia: multimodal analgesia not used between 6 hours prior to anesthesia start to PACU discharge  Patient location during evaluation: PACU  Patient participation: complete - patient participated  Level of consciousness: awake and alert  Pain management: adequate  Airway patency: patent  Anesthetic complications: no  Cardiovascular status: hemodynamically stable  Respiratory status: acceptable  Hydration status: acceptable        Vitals Value Taken Time   BP 93/46 6/13/2019 10:47 AM   Temp 36.4 °C (97.5 °F) 6/13/2019 10:37 AM   Pulse 83 6/13/2019 10:52 AM   Resp 16 6/13/2019 10:37 AM   SpO2 95 % 6/13/2019 10:52 AM   Vitals shown include unvalidated device data.

## 2019-06-17 ENCOUNTER — PATIENT OUTREACH (OUTPATIENT)
Dept: CASE MANAGEMENT | Age: 74
End: 2019-06-17

## 2019-08-22 ENCOUNTER — HOSPITAL ENCOUNTER (OUTPATIENT)
Dept: LAB | Age: 74
Discharge: HOME | End: 2019-08-22
Payer: MEDICARE

## 2019-08-22 LAB — TSH SERPL DL<=0.005 MIU/L-ACNC: 1.04 UIU/ML (ref 0.36–3.74)

## 2019-08-22 PROCEDURE — 84443 ASSAY THYROID STIM HORMONE: CPT

## 2019-08-28 PROBLEM — I73.9 PERIPHERAL VASCULAR DISEASE (HCC): Status: ACTIVE | Noted: 2019-08-28

## 2019-12-31 PROBLEM — Z98.890 H/O CAROTID ENDARTERECTOMY: Status: ACTIVE | Noted: 2019-12-31

## 2020-03-05 ENCOUNTER — HOSPITAL ENCOUNTER (OUTPATIENT)
Dept: SLEEP MEDICINE | Age: 75
Discharge: HOME | End: 2020-03-05
Payer: MEDICARE

## 2020-03-05 PROCEDURE — 95806 SLEEP STUDY UNATT&RESP EFFT: CPT

## 2020-05-01 ENCOUNTER — HOSPITAL ENCOUNTER (OUTPATIENT)
Dept: CT IMAGING | Age: 75
Discharge: HOME | End: 2020-05-01
Attending: INTERNAL MEDICINE
Payer: MEDICARE

## 2020-05-01 DIAGNOSIS — F17.200 SMOKING: ICD-10-CM

## 2020-05-01 PROCEDURE — G0297 LDCT FOR LUNG CA SCREEN: HCPCS

## 2020-05-05 ENCOUNTER — TELEPHONE (OUTPATIENT)
Dept: PHARMACY | Age: 75
End: 2020-05-05

## 2020-05-05 NOTE — TELEPHONE ENCOUNTER
Griselda Rojas-     Can you please follow up on adherence of rosuvastatin.      Thank you,  Almita Busby, PharmD, [facility] Pharmacist  Direct: [phone], Ext [phone]

## 2020-05-07 NOTE — TELEPHONE ENCOUNTER
CLINICAL PHARMACY: ADHERENCE REVIEW  Identified care gap per [facility] [employer] fills at [facility]: Statin adherence    Last Office Visit: 20    Patient also appears to be taking: Metformin 500mg, fosinopril 40mg      STATIN ADHERENCE    Per [address]   Rosuvastatin 10mg  last filled on 90 for a 90 day supply; SI tab po nightly; last picked up on 2019. 2 refills remaining. Billed through [facility] [employer]    Previous fills 2019, 11/15/2019  Days Supply 90, 90  Prescribing MD: Alicia Koehler MD    Lab Results   Component Value Date/Time    Cholesterol, total 162 2020 10:04 AM    HDL Cholesterol 53 2020 10:04 AM    LDL, calculated 61 2020 10:04 AM    VLDL, calculated 48 (H) 2020 10:04 AM    Triglyceride 241 (H) 2020 10:04 AM    CHOL/HDL Ratio 3.6 2017 08:36 AM     ALT (SGPT)   Date Value Ref Range Status   2020 31 0 - 32 IU/L Final     The ASCVD Risk score (Ash Math., et al., 2013) failed to calculate for the following reasons:    ASCVD risk score not calculated     PLAN  Attempting to reach patient to review.  Left message asking for return call. Called pharmacy and had medication processed at not charge should receive within 7-10 business days. Attempted to contact patient to inform her about refill and to educate on importance of taking medication as prescribed.      Sending letter for outreach

## 2020-06-25 NOTE — TELEPHONE ENCOUNTER
CLINICAL PHARMACY CONSULT: MED RECONCILIATION/REVIEW ADDENDUM    For Pharmacy Admin Tracking Only    PHSO: PHSO Patient?: Yes  Total # of Interventions Recommended: Count: 1  - New Order #: 1 New Medication Order Reason(s):  Adherence  - Maintenance Safety Lab Monitoring #: 1  Recommended intervention potential cost savings: 0  Total Interventions Accepted: 0  Time Spent (min): 15    Clearance Close, [facility], PharmD  [address]

## 2020-08-13 PROBLEM — I65.22 LEFT CAROTID STENOSIS: Status: ACTIVE | Noted: 2020-08-13

## 2022-03-19 PROBLEM — I65.21 CAROTID STENOSIS, RIGHT: Status: ACTIVE | Noted: 2019-05-16

## 2022-03-19 PROBLEM — E11.42 DIABETIC POLYNEUROPATHY ASSOCIATED WITH TYPE 2 DIABETES MELLITUS (HCC): Status: ACTIVE | Noted: 2017-10-04

## 2022-03-19 PROBLEM — I65.22 LEFT CAROTID STENOSIS: Status: ACTIVE | Noted: 2020-08-13

## 2022-03-19 PROBLEM — Z98.890 H/O CAROTID ENDARTERECTOMY: Status: ACTIVE | Noted: 2019-12-31

## 2022-03-19 PROBLEM — I73.9 PERIPHERAL VASCULAR DISEASE (HCC): Status: ACTIVE | Noted: 2019-08-28

## 2022-03-19 PROBLEM — E66.9 CLASS 1 OBESITY WITH BODY MASS INDEX (BMI) OF 32.0 TO 32.9 IN ADULT: Status: ACTIVE | Noted: 2018-06-20

## 2022-03-19 PROBLEM — E66.811 CLASS 1 OBESITY WITH BODY MASS INDEX (BMI) OF 32.0 TO 32.9 IN ADULT: Status: ACTIVE | Noted: 2018-06-20

## 2022-10-03 ENCOUNTER — HOSPITAL ENCOUNTER (EMERGENCY)
Age: 77
Discharge: HOME | End: 2022-10-03
Attending: EMERGENCY MEDICINE
Payer: MEDICARE

## 2022-10-03 ENCOUNTER — APPOINTMENT (OUTPATIENT)
Dept: GENERAL RADIOLOGY | Age: 77
End: 2022-10-03
Payer: MEDICARE

## 2022-10-03 ENCOUNTER — APPOINTMENT (OUTPATIENT)
Dept: CT IMAGING | Age: 77
End: 2022-10-03
Payer: MEDICARE

## 2022-10-03 ENCOUNTER — OFFICE VISIT (OUTPATIENT)
Dept: FAMILY MEDICINE CLINIC | Facility: CLINIC | Age: 77
End: 2022-10-03
Payer: MEDICARE

## 2022-10-03 VITALS
TEMPERATURE: 98.4 F | SYSTOLIC BLOOD PRESSURE: 104 MMHG | BODY MASS INDEX: 27.09 KG/M2 | HEIGHT: 59 IN | OXYGEN SATURATION: 95 % | WEIGHT: 134.4 LBS | HEART RATE: 95 BPM | DIASTOLIC BLOOD PRESSURE: 64 MMHG

## 2022-10-03 VITALS
TEMPERATURE: 98.3 F | HEART RATE: 95 BPM | DIASTOLIC BLOOD PRESSURE: 51 MMHG | SYSTOLIC BLOOD PRESSURE: 139 MMHG | BODY MASS INDEX: 27.01 KG/M2 | OXYGEN SATURATION: 94 % | RESPIRATION RATE: 16 BRPM | WEIGHT: 134 LBS | HEIGHT: 59 IN

## 2022-10-03 DIAGNOSIS — E78.5 HYPERLIPIDEMIA, UNSPECIFIED HYPERLIPIDEMIA TYPE: ICD-10-CM

## 2022-10-03 DIAGNOSIS — R74.8 ELEVATED LIVER ENZYMES: ICD-10-CM

## 2022-10-03 DIAGNOSIS — I10 ESSENTIAL HYPERTENSION: ICD-10-CM

## 2022-10-03 DIAGNOSIS — I65.22 LEFT CAROTID STENOSIS: ICD-10-CM

## 2022-10-03 DIAGNOSIS — E53.8 VITAMIN B12 DEFICIENCY: ICD-10-CM

## 2022-10-03 DIAGNOSIS — J44.9 CHRONIC OBSTRUCTIVE PULMONARY DISEASE, UNSPECIFIED COPD TYPE (HCC): ICD-10-CM

## 2022-10-03 DIAGNOSIS — C51.9 PRIMARY VULVAR CANCER (HCC): ICD-10-CM

## 2022-10-03 DIAGNOSIS — S09.90XA INJURY OF HEAD, INITIAL ENCOUNTER: Primary | ICD-10-CM

## 2022-10-03 DIAGNOSIS — Z76.89 ENCOUNTER TO ESTABLISH CARE: ICD-10-CM

## 2022-10-03 DIAGNOSIS — G47.33 OSA (OBSTRUCTIVE SLEEP APNEA): ICD-10-CM

## 2022-10-03 DIAGNOSIS — F17.200 SMOKING: ICD-10-CM

## 2022-10-03 DIAGNOSIS — E11.42 DIABETIC POLYNEUROPATHY ASSOCIATED WITH TYPE 2 DIABETES MELLITUS (HCC): ICD-10-CM

## 2022-10-03 DIAGNOSIS — Z98.890 H/O CAROTID ENDARTERECTOMY: ICD-10-CM

## 2022-10-03 DIAGNOSIS — E66.09 CLASS 1 OBESITY DUE TO EXCESS CALORIES WITH SERIOUS COMORBIDITY AND BODY MASS INDEX (BMI) OF 32.0 TO 32.9 IN ADULT: ICD-10-CM

## 2022-10-03 DIAGNOSIS — K21.9 GASTROESOPHAGEAL REFLUX DISEASE, UNSPECIFIED WHETHER ESOPHAGITIS PRESENT: ICD-10-CM

## 2022-10-03 DIAGNOSIS — I65.21 CAROTID STENOSIS, RIGHT: Primary | ICD-10-CM

## 2022-10-03 DIAGNOSIS — Z13.29 THYROID DISORDER SCREENING: ICD-10-CM

## 2022-10-03 DIAGNOSIS — D50.9 IRON DEFICIENCY ANEMIA, UNSPECIFIED IRON DEFICIENCY ANEMIA TYPE: ICD-10-CM

## 2022-10-03 DIAGNOSIS — I95.1 ORTHOSTATIC HYPOTENSION: ICD-10-CM
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
## 2022-10-03 DIAGNOSIS — I73.9 PERIPHERAL VASCULAR DISEASE (HCC): ICD-10-CM

## 2022-10-03 DIAGNOSIS — F41.9 ANXIETY: ICD-10-CM

## 2022-10-03 DIAGNOSIS — Z23 ENCOUNTER FOR IMMUNIZATION: ICD-10-CM

## 2022-10-03 DIAGNOSIS — Z79.899 MEDICATION MANAGEMENT: ICD-10-CM

## 2022-10-03 DIAGNOSIS — E55.9 VITAMIN D DEFICIENCY: ICD-10-CM

## 2022-10-03 DIAGNOSIS — E11.649 UNCONTROLLED TYPE 2 DIABETES MELLITUS WITH HYPOGLYCEMIA WITHOUT COMA (HCC): ICD-10-CM

## 2022-10-03 LAB — HBA1C MFR BLD: 10.1 %

## 2022-10-03 PROCEDURE — 73502 X-RAY EXAM HIP UNI 2-3 VIEWS: CPT

## 2022-10-03 PROCEDURE — 83036 HEMOGLOBIN GLYCOSYLATED A1C: CPT | Performed by: NURSE PRACTITIONER

## 2022-10-03 PROCEDURE — G8484 FLU IMMUNIZE NO ADMIN: HCPCS | Performed by: NURSE PRACTITIONER

## 2022-10-03 PROCEDURE — 99284 EMERGENCY DEPT VISIT MOD MDM: CPT

## 2022-10-03 PROCEDURE — G0439 PPPS, SUBSEQ VISIT: HCPCS | Performed by: NURSE PRACTITIONER

## 2022-10-03 PROCEDURE — G0008 ADMIN INFLUENZA VIRUS VAC: HCPCS | Performed by: NURSE PRACTITIONER

## 2022-10-03 PROCEDURE — G8427 DOCREV CUR MEDS BY ELIG CLIN: HCPCS | Performed by: NURSE PRACTITIONER

## 2022-10-03 PROCEDURE — 1123F ACP DISCUSS/DSCN MKR DOCD: CPT | Performed by: NURSE PRACTITIONER

## 2022-10-03 PROCEDURE — 99203 OFFICE O/P NEW LOW 30 MIN: CPT | Performed by: NURSE PRACTITIONER

## 2022-10-03 PROCEDURE — 90694 VACC AIIV4 NO PRSRV 0.5ML IM: CPT | Performed by: NURSE PRACTITIONER

## 2022-10-03 PROCEDURE — G8417 CALC BMI ABV UP PARAM F/U: HCPCS | Performed by: NURSE PRACTITIONER

## 2022-10-03 PROCEDURE — G8399 PT W/DXA RESULTS DOCUMENT: HCPCS | Performed by: NURSE PRACTITIONER

## 2022-10-03 PROCEDURE — 70450 CT HEAD/BRAIN W/O DYE: CPT

## 2022-10-03 PROCEDURE — 4004F PT TOBACCO SCREEN RCVD TLK: CPT | Performed by: NURSE PRACTITIONER

## 2022-10-03 PROCEDURE — 3023F SPIROM DOC REV: CPT | Performed by: NURSE PRACTITIONER

## 2022-10-03 PROCEDURE — 1090F PRES/ABSN URINE INCON ASSESS: CPT | Performed by: NURSE PRACTITIONER

## 2022-10-03 RX ORDER — ASPIRIN 81 MG/1
81 TABLET ORAL DAILY
COMMUNITY

## 2022-10-03 RX ORDER — BLOOD-GLUCOSE METER
1 KIT MISCELLANEOUS DAILY
Qty: 1 KIT | Refills: 0 | Status: SHIPPED | OUTPATIENT
Start: 2022-10-03

## 2022-10-03 RX ORDER — ROSUVASTATIN CALCIUM 10 MG/1
10 TABLET, COATED ORAL DAILY
Qty: 30 TABLET | Refills: 2 | Status: SHIPPED | OUTPATIENT
Start: 2022-10-03

## 2022-10-03 RX ORDER — LANCETS 30 GAUGE
EACH MISCELLANEOUS
Qty: 100 EACH | Refills: 11 | Status: SHIPPED | OUTPATIENT
Start: 2022-10-03

## 2022-10-03 RX ORDER — GLUCOSAMINE HCL/CHONDROITIN SU 500-400 MG
CAPSULE ORAL
Qty: 100 STRIP | Refills: 11 | Status: SHIPPED | OUTPATIENT
Start: 2022-10-03

## 2022-10-03 RX ORDER — INSULIN GLARGINE 100 [IU]/ML
10 INJECTION, SOLUTION SUBCUTANEOUS NIGHTLY
Qty: 5 ADJUSTABLE DOSE PRE-FILLED PEN SYRINGE | Refills: 3 | Status: SHIPPED | OUTPATIENT
Start: 2022-10-03 | End: 2022-11-03 | Stop reason: SDUPTHER

## 2022-10-03 RX ORDER — ALBUTEROL SULFATE 90 UG/1
1 AEROSOL, METERED RESPIRATORY (INHALATION) EVERY 6 HOURS PRN
Qty: 18 G | Refills: 2 | Status: SHIPPED | OUTPATIENT
Start: 2022-10-03

## 2022-10-03 RX ORDER — AMLODIPINE BESYLATE 5 MG/1
TABLET ORAL
COMMUNITY
Start: 2022-08-26 | End: 2022-10-03 | Stop reason: SDUPTHER

## 2022-10-03 RX ORDER — FERROUS SULFATE 325(65) MG
650 TABLET ORAL DAILY
COMMUNITY
Start: 2020-09-23

## 2022-10-03 RX ORDER — ALBUTEROL SULFATE 90 UG/1
1 AEROSOL, METERED RESPIRATORY (INHALATION) EVERY 6 HOURS PRN
COMMUNITY
Start: 2020-01-20 | End: 2022-10-03 | Stop reason: SDUPTHER

## 2022-10-03 RX ORDER — FOSINOPRIL SODIUM 40 MG/1
40 TABLET ORAL DAILY
Qty: 30 TABLET | Refills: 2 | Status: SHIPPED | OUTPATIENT
Start: 2022-10-03

## 2022-10-03 RX ORDER — ASCORBIC ACID 250 MG
250 TABLET ORAL DAILY
COMMUNITY

## 2022-10-03 RX ORDER — ROSUVASTATIN CALCIUM 10 MG/1
10 TABLET, COATED ORAL
COMMUNITY
Start: 2020-05-21 | End: 2022-10-03 | Stop reason: SDUPTHER

## 2022-10-03 RX ORDER — FOSINOPRIL SODIUM 40 MG/1
40 TABLET ORAL DAILY
COMMUNITY
Start: 2020-09-23 | End: 2022-10-03 | Stop reason: SDUPTHER

## 2022-10-03 RX ORDER — TRIAMTERENE AND HYDROCHLOROTHIAZIDE 37.5; 25 MG/1; MG/1
1 TABLET ORAL DAILY
COMMUNITY
Start: 2020-09-23 | End: 2022-10-03 | Stop reason: SDUPTHER

## 2022-10-03 RX ORDER — TRIAMTERENE AND HYDROCHLOROTHIAZIDE 37.5; 25 MG/1; MG/1
0.5 TABLET ORAL DAILY
Qty: 30 TABLET | Refills: 2 | Status: SHIPPED | OUTPATIENT
Start: 2022-10-03

## 2022-10-03 RX ORDER — FAMOTIDINE 20 MG/1
20 TABLET, FILM COATED ORAL DAILY PRN
Qty: 30 TABLET | Refills: 5 | Status: SHIPPED | OUTPATIENT
Start: 2022-10-03

## 2022-10-03 RX ORDER — FAMOTIDINE 20 MG/1
TABLET, FILM COATED ORAL
COMMUNITY
Start: 2022-08-26 | End: 2022-10-03 | Stop reason: SDUPTHER

## 2022-10-03 RX ORDER — AMLODIPINE BESYLATE 5 MG/1
5 TABLET ORAL DAILY
Qty: 30 TABLET | Refills: 2 | Status: SHIPPED | OUTPATIENT
Start: 2022-10-03

## 2022-10-03 RX ORDER — TRIAMTERENE AND HYDROCHLOROTHIAZIDE 37.5; 25 MG/1; MG/1
1 TABLET ORAL DAILY
Qty: 30 TABLET | Refills: 2 | Status: SHIPPED | OUTPATIENT
Start: 2022-10-03 | End: 2022-10-03

## 2022-10-03 ASSESSMENT — ENCOUNTER SYMPTOMS
EYES NEGATIVE: 1
VOMITING: 0
NAUSEA: 0
SHORTNESS OF BREATH: 1
GASTROINTESTINAL NEGATIVE: 1
BACK PAIN: 1
WHEEZING: 1
ALLERGIC/IMMUNOLOGIC NEGATIVE: 1
COUGH: 1
COUGH: 0

## 2022-10-03 ASSESSMENT — PATIENT HEALTH QUESTIONNAIRE - PHQ9
SUM OF ALL RESPONSES TO PHQ QUESTIONS 1-9: 0
1. LITTLE INTEREST OR PLEASURE IN DOING THINGS: 0
2. FEELING DOWN, DEPRESSED OR HOPELESS: 0
SUM OF ALL RESPONSES TO PHQ9 QUESTIONS 1 & 2: 0
SUM OF ALL RESPONSES TO PHQ QUESTIONS 1-9: 0

## 2022-10-03 ASSESSMENT — LIFESTYLE VARIABLES
HOW OFTEN DO YOU HAVE A DRINK CONTAINING ALCOHOL: NEVER
HOW MANY STANDARD DRINKS CONTAINING ALCOHOL DO YOU HAVE ON A TYPICAL DAY: PATIENT DOES NOT DRINK

## 2022-10-03 ASSESSMENT — PAIN - FUNCTIONAL ASSESSMENT: PAIN_FUNCTIONAL_ASSESSMENT: NONE - DENIES PAIN

## 2022-10-03 NOTE — ED TRIAGE NOTES
Pt reports tripping in the doorway earlier this afternoon, fell onto right hip, pts denies any LOC or hitting her head but she did have 2 episodes of vomiting after fall, no thinners, no tenderness found upon palpation, pupils equal and reactive. Denies dizziness/blurred vision.

## 2022-10-03 NOTE — PATIENT INSTRUCTIONS
Personalized Preventive Plan for Lloyd Meneses - 10/3/2022  Medicare offers a range of preventive health benefits. Some of the tests and screenings are paid in full while other may be subject to a deductible, co-insurance, and/or copay. Some of these benefits include a comprehensive review of your medical history including lifestyle, illnesses that may run in your family, and various assessments and screenings as appropriate. After reviewing your medical record and screening and assessments performed today your provider may have ordered immunizations, labs, imaging, and/or referrals for you. A list of these orders (if applicable) as well as your Preventive Care list are included within your After Visit Summary for your review. Other Preventive Recommendations:    A preventive eye exam performed by an eye specialist is recommended every 1-2 years to screen for glaucoma; cataracts, macular degeneration, and other eye disorders. A preventive dental visit is recommended every 6 months. Try to get at least 150 minutes of exercise per week or 10,000 steps per day on a pedometer . Order or download the FREE \"Exercise & Physical Activity: Your Everyday Guide\" from [employer]. Call 6-554.801.7774 or search [employer] online. You need 1496-2790 mg of calcium and 4454-9500 IU of vitamin D per day. It is possible to meet your calcium requirement with diet alone, but a vitamin D supplement is usually necessary to meet this goal.  When exposed to the sun, use a sunscreen that protects against both UVA and UVB radiation with an SPF of 30 or greater. Reapply every 2 to 3 hours or after sweating, drying off with a towel, or swimming. Always wear a seat belt when traveling in a car. Always wear a helmet when riding a bicycle or motorcycle.

## 2022-10-03 NOTE — PROGRESS NOTES
Medicare Annual Wellness Visit    Armando Ordoñez is here for Medicare AWV, New Patient, and Establish Care    Assessment & Plan   Carotid stenosis, right  Essential hypertension  -     Comprehensive Metabolic Panel; Future  -     amLODIPine (NORVASC) 5 MG tablet; Take 1 tablet by mouth daily, Disp-30 tablet, R-2Normal  -     fosinopril (MONOPRIL) 40 MG tablet; Take 1 tablet by mouth daily, Disp-30 tablet, R-2Normal  -     Magnesium; Future  -     triamterene-hydroCHLOROthiazide (MAXZIDE-25) 37.5-25 MG per tablet; Take 0.5 tablets by mouth daily, Disp-30 tablet, R-2Her dose was decreased to 1/2 tablet daily due to orthostasis. Normal  Left carotid stenosis  Peripheral vascular disease (HCC)  Chronic obstructive pulmonary disease, unspecified COPD type (HCC)  -     CBC with Auto Differential; Future  -     albuterol sulfate HFA (PROVENTIL;VENTOLIN;PROAIR) 108 (90 Base) MCG/ACT inhaler; Inhale 1 puff into the lungs every 6 hours as needed for Wheezing or Shortness of Breath, Disp-18 g, R-2Normal  Diabetic polyneuropathy associated with type 2 diabetes mellitus ([facility] [address]  -     AMB POC HEMOGLOBIN A1C  -     Comprehensive Metabolic Panel; Future  -     metFORMIN (GLUCOPHAGE) 1000 MG tablet; Take 1 tablet by mouth 2 times daily, Disp-60 tablet, R-5Normal  -     insulin glargine (LANTUS SOLOSTAR) 100 UNIT/ML injection pen; Inject 10 Units into the skin nightly, Disp-5 Adjustable Dose Pre-filled Pen Syringe, R-3Normal  -     Insulin Pen Needle 32G X 6 MM MISC; Disp-100 each, R-11, NormalFor insulin injections once a day at night. Primary vulvar cancer (HCC)  Anxiety  Class 1 obesity due to excess calories with serious comorbidity and body mass index (BMI) of 32.0 to 32.9 in adult  -     Comprehensive Metabolic Panel; Future  H/O carotid endarterectomy  Elevated liver enzymes  -     Comprehensive Metabolic Panel; Future  Hyperlipidemia, unspecified hyperlipidemia type  -     Lipid Panel;  Future  -     rosuvastatin (CRESTOR) 10 MG tablet; Take 1 tablet by mouth daily, Disp-30 tablet, R-2Normal  Iron deficiency anemia, unspecified iron deficiency anemia type  -     CBC with Auto Differential; Future  Smoking  Vitamin B12 deficiency  -     CBC with Auto Differential; Future  Vitamin D deficiency  -     Comprehensive Metabolic Panel; Future  -     Vitamin D 25 Hydroxy; Future  GHADA (obstructive sleep apnea)  -     CBC with Auto Differential; Future  Thyroid disorder screening  -     TSH with Reflex; Future  Medication management  -     Magnesium; Future  Gastroesophageal reflux disease, unspecified whether esophagitis present  -     famotidine (PEPCID) 20 MG tablet; Take 1 tablet by mouth daily as needed (heartburn), Disp-30 tablet, R-5Normal  Uncontrolled type 2 diabetes mellitus with hypoglycemia without coma (HCC)  -     glucose monitoring (FREESTYLE FREEDOM) kit; DAILY Starting Mon 10/3/2022, Disp-1 kit, R-0, NormalMeter of choice per insurance and patient. -     blood glucose monitor strips; Test 2 times a day & as needed for symptoms of irregular blood glucose. Dispense sufficient amount for indicated testing frequency plus additional to accommodate PRN testing needs. , Disp-100 strip, R-11, Normal  -     Lancets MISC; Disp-100 each, R-11, NormalSig: Test 2 times a day & as needed for symptoms of irregular blood glucose. Dispense sufficient amount for indicated testing frequency plus additional to accommodate PRN testing needs. Encounter for immunization  -     Influenza, FLUAD, (age 72 y+), IM, Preservative Free, 0.5 mL  Encounter to establish care  Orthostatic hypotension      Declines hepatitis C screening. She is currently already established with [facility] seen this year. We will obtain diabetic foot exam at next visit. Patient reports she has a son that can help with her insulin injections. Patient voices understanding to check her glucose 1-2 times a day and record and bring back to her next appointment. Patient voices understanding to take glargine 10 units subcu at night. She reports her blood sugars have been between 203 100. She has been noncompliant with her diet. She reports she had been off her diet due to her illness with COVID and had eaten basically anything that she could eat. She declines diabetic education. Provided patient with written material regarding diabetic diet patient denies any questions or concerns. Patient is noted to have some mild orthostatic hypotension in the office. Discussed with patient we will cut her Maxide in half, so take half tablet daily. Will recheck labs. Patient is asymptomatic. Recommendations for Preventive Services Due: see orders and patient instructions/AVS.  Recommended screening schedule for the next 5-10 years is provided to the patient in written form: see Patient Instructions/AVS.     Return in 1 month (on 11/3/2022) for Medicare Annual Wellness Visit in 1 year, Diabetes. Subjective   The following acute and/or chronic problems were also addressed today:  Patient presents new to me and the practice to establish care. She had previously been seen by Dr. Arin Clinton with [facility] in September. Not out of medication. Her A1c was well controlled in May 2022 after being uncontrolled in December 2021. She has been taking metformin 500 mg 1 capsule in the morning and 2 capsules in the evening. She had been prescribed 1 other pill $400 for diabetes and unable to pay for the medication. She does not remember the cost.  A1c 6.6 in May 2022. 9.8 December 2021. Unknown how long she has had diabetes. Has been eating anything she wants. Noncompliant with cpap machine takes mask off tried 2-3 months. She does not want to follow-up with sleep medicine for other options. Patient's complete Health Risk Assessment and screening values have been reviewed and are found in Flowsheets.  The following problems were reviewed today and where indicated follow up appointments were made and/or referrals ordered. Positive Risk Factor Screenings with Interventions:    Fall Risk:  Do you feel unsteady or are you worried about falling? : (!) yes  2 or more falls in past year?: (!) yes  Fall with injury in past year?: (!) yes   Fall Risk Interventions:    Patient declines any further evaluation/treatment for this issue  She reports she has no gait disturbance, but she does ambulate with a cane if needed. She reports prior falls beginning of this year with no further sequela. Possibly tripped on something. General Health and ACP:  General  In general, how would you say your health is?: Good  In the past 7 days, have you experienced any of the following: New or Increased Pain, New or Increased Fatigue, Loneliness, Social Isolation, Stress or Anger?: No  Do you get the social and emotional support that you need?: Yes  Do you have a Living Will?: Yes    Advance Directives       Power of  Living Will ACP-Advance Directive ACP-Power of Temi Cortes on 04/24/19 Not on File Not on File Filed          General Health Risk Interventions:    Health Habits/Nutrition:  Physical Activity: Inactive    Days of Exercise per Week: 0 days    Minutes of Exercise per Session: 0 min     Have you lost any weight without trying in the past 3 months?: (!) Yes  Body mass index: (!) 27.14  Have you seen the dentist within the past year?: (!) No  Health Habits/Nutrition Interventions:  Nutritional issues:  patient is not ready to address his/her nutritional/weight issues at this time  Declines DTC referral .  Patient reports her weight is back up now that she is feeling better from having COVID.              Objective   Vitals:    10/03/22 1134 10/03/22 1255 10/03/22 1256 10/03/22 1257   BP: (!) 90/50 (!) 98/48 128/64 104/64   Site: Left Upper Arm Right Upper Arm Right Upper Arm Right Upper Arm   Position: Sitting Supine Sitting Standing   Cuff Size: Medium Adult      Pulse: 90 95 95 95   Temp: 98.4 °F (36.9 °C)      TempSrc: Oral      SpO2: 95%      Weight: 134 lb 6.4 oz (61 kg)      Height: 4' 11\" (1.499 m)         Body mass index is 27.15 kg/m². No Known Allergies  Prior to Visit Medications    Medication Sig Taking? Authorizing Provider   ferrous sulfate (IRON 325) 325 (65 Fe) MG tablet Take 650 mg by mouth daily Yes Historical Provider, MD   aspirin 81 MG EC tablet Take 81 mg by mouth daily Yes Historical Provider, MD   cyanocobalamin 2000 MCG tablet Take 1 tablet by mouth daily Yes Historical Provider, MD   ascorbic acid (VITAMIN C) 250 MG tablet Take 250 mg by mouth daily Yes Historical Provider, MD   albuterol sulfate HFA (PROVENTIL;VENTOLIN;PROAIR) 108 (90 Base) MCG/ACT inhaler Inhale 1 puff into the lungs every 6 hours as needed for Wheezing or Shortness of Breath Yes Gladies Ton APRN - CNP   amLODIPine (NORVASC) 5 MG tablet Take 1 tablet by mouth daily Yes Gladies Ton APRN - CNP   famotidine (PEPCID) 20 MG tablet Take 1 tablet by mouth daily as needed (heartburn) Yes Gladies Ton APRN - CNP   fosinopril (MONOPRIL) 40 MG tablet Take 1 tablet by mouth daily Yes Gladies Ton APRN - CNP   metFORMIN (GLUCOPHAGE) 1000 MG tablet Take 1 tablet by mouth 2 times daily Yes Gladies Ton APRN - CNP   rosuvastatin (CRESTOR) 10 MG tablet Take 1 tablet by mouth daily Yes Gladies Ton APRN - CNP   insulin glargine (LANTUS SOLOSTAR) 100 UNIT/ML injection pen Inject 10 Units into the skin nightly Yes Gladies Tno APRN - CNP   Insulin Pen Needle 32G X 6 MM MISC For insulin injections once a day at night. Yes Gladies Ton APRN - CNP   triamterene-hydroCHLOROthiazide (MAXZIDE-25) 37.5-25 MG per tablet Take 0.5 tablets by mouth daily Yes Gladies Ton APRRENATO - DIPAK   glucose monitoring (FREESTYLE FREEDOM) kit 1 kit by Does not apply route daily Meter of choice per insurance and patient.  Yes Gladies Ton APRN - CNP   blood glucose monitor strips Test 2 times a day & as needed for symptoms of irregular blood glucose. Dispense sufficient amount for indicated testing frequency plus additional to accommodate PRN testing needs. Yes SHALA Barrientos CNP   Lancets MISC Sig: Test 2 times a day & as needed for symptoms of irregular blood glucose. Dispense sufficient amount for indicated testing frequency plus additional to accommodate PRN testing needs. Yes SHALA Barrientos CNP       CareTeam (Including outside providers/suppliers regularly involved in providing care):   Patient Care Team:  SHALA Barrientos CNP as PCP - General (Nurse Practitioner)     Reviewed and updated this visit:  Allergies  Meds         Review of Systems   Constitutional:  Positive for unexpected weight change. Negative for activity change, appetite change, chills, fatigue and fever. When she had COVID-19, now now problems. HENT: Negative. Eyes: Negative. Jervey    Respiratory:  Positive for cough, shortness of breath and wheezing. Symptoms with a lot of walking. Hasn't had to use inhaler for over a week. Cardiovascular: Negative. Negative for chest pain, palpitations and leg swelling. Gastrointestinal: Negative. Endocrine: Negative. Negative for cold intolerance, heat intolerance, polydipsia, polyphagia and polyuria. Genitourinary: Negative. Musculoskeletal:  Positive for back pain. Negative for arthralgias, joint swelling, myalgias and neck pain. Skin: Negative. Allergic/Immunologic: Negative. Negative for environmental allergies. Neurological: Negative. Negative for dizziness, weakness, numbness and headaches. Hematological: Negative. Negative for adenopathy. Does not bruise/bleed easily. Psychiatric/Behavioral:  Negative for dysphoric mood and sleep disturbance. The patient is not nervous/anxious. Physical Exam  Constitutional:       General: She is not in acute distress. Appearance: Normal appearance. She is normal weight.  She is not ill-appearing, toxic-appearing or diaphoretic. HENT:      Head: Normocephalic and atraumatic. Right Ear: Tympanic membrane, ear canal and external ear normal.      Left Ear: Tympanic membrane, ear canal and external ear normal.   Eyes:      Extraocular Movements: Extraocular movements intact. Conjunctiva/sclera: Conjunctivae normal.      Pupils: Pupils are equal, round, and reactive to light. Cardiovascular:      Rate and Rhythm: Normal rate and regular rhythm. Pulses: Normal pulses. Heart sounds: Normal heart sounds. Pulmonary:      Effort: Pulmonary effort is normal.      Breath sounds: Normal breath sounds. Abdominal:      General: There is no distension. Musculoskeletal:         General: Normal range of motion. Cervical back: Normal range of motion and neck supple. Right lower leg: No edema. Left lower leg: No edema. Skin:     General: Skin is warm and dry. Coloration: Skin is not jaundiced or pale. Neurological:      General: No focal deficit present. Mental Status: She is alert and oriented to person, place, and time. Psychiatric:         Mood and Affect: Mood normal.         Behavior: Behavior normal.         Thought Content: Thought content normal.         Judgment: Judgment normal.     Saba Garcia, NP

## 2022-10-04 LAB
25(OH)D3 SERPL-MCNC: 24.9 NG/ML (ref 30–100)
ALBUMIN SERPL-MCNC: 4.1 G/DL (ref 3.2–4.6)
ALBUMIN/GLOB SERPL: 1.4 {RATIO} (ref 1.2–3.5)
ALP SERPL-CCNC: 81 U/L (ref 50–136)
ALT SERPL-CCNC: 32 U/L (ref 12–65)
ANION GAP SERPL CALC-SCNC: 8 MMOL/L (ref 4–13)
AST SERPL-CCNC: 11 U/L (ref 15–37)
BASOPHILS # BLD: 0.1 K/UL (ref 0–0.2)
BASOPHILS NFR BLD: 0 % (ref 0–2)
BILIRUB SERPL-MCNC: 0.3 MG/DL (ref 0.2–1.1)
BUN SERPL-MCNC: 26 MG/DL (ref 8–23)
CALCIUM SERPL-MCNC: 9.7 MG/DL (ref 8.3–10.4)
CHLORIDE SERPL-SCNC: 97 MMOL/L (ref 101–110)
CHOLEST SERPL-MCNC: 163 MG/DL
CO2 SERPL-SCNC: 28 MMOL/L (ref 21–32)
CREAT SERPL-MCNC: 0.8 MG/DL (ref 0.6–1)
DIFFERENTIAL METHOD BLD: ABNORMAL
EOSINOPHIL # BLD: 0.1 K/UL (ref 0–0.8)
EOSINOPHIL NFR BLD: 0 % (ref 0.5–7.8)
ERYTHROCYTE [DISTWIDTH] IN BLOOD BY AUTOMATED COUNT: 14.8 % (ref 11.9–14.6)
GLOBULIN SER CALC-MCNC: 3 G/DL (ref 2.3–3.5)
GLUCOSE SERPL-MCNC: 352 MG/DL (ref 65–100)
HCT VFR BLD AUTO: 48 % (ref 35.8–46.3)
HDLC SERPL-MCNC: 59 MG/DL (ref 40–60)
HDLC SERPL: 2.8 {RATIO}
HGB BLD-MCNC: 15 G/DL (ref 11.7–15.4)
IMM GRANULOCYTES # BLD AUTO: 0.2 K/UL (ref 0–0.5)
IMM GRANULOCYTES NFR BLD AUTO: 1 % (ref 0–5)
LDLC SERPL CALC-MCNC: 50.2 MG/DL
LYMPHOCYTES # BLD: 0.8 K/UL (ref 0.5–4.6)
LYMPHOCYTES NFR BLD: 5 % (ref 13–44)
MCH RBC QN AUTO: 31.8 PG (ref 26.1–32.9)
MCHC RBC AUTO-ENTMCNC: 31.3 G/DL (ref 31.4–35)
MCV RBC AUTO: 101.7 FL (ref 79.6–97.8)
MONOCYTES # BLD: 1.1 K/UL (ref 0.1–1.3)
MONOCYTES NFR BLD: 6 % (ref 4–12)
NEUTS SEG # BLD: 15.2 K/UL (ref 1.7–8.2)
NEUTS SEG NFR BLD: 88 % (ref 43–78)
NRBC # BLD: 0 K/UL (ref 0–0.2)
PLATELET # BLD AUTO: 232 K/UL (ref 150–450)
PMV BLD AUTO: 13.4 FL (ref 9.4–12.3)
POTASSIUM SERPL-SCNC: 3.9 MMOL/L (ref 3.5–5.1)
PROT SERPL-MCNC: 7.1 G/DL (ref 6.3–8.2)
RBC # BLD AUTO: 4.72 M/UL (ref 4.05–5.2)
SODIUM SERPL-SCNC: 133 MMOL/L (ref 136–145)
TRIGL SERPL-MCNC: 269 MG/DL (ref 35–150)
TSH W FREE THYROID IF ABNORMAL: 0.56 UIU/ML (ref 0.36–3.74)
VLDLC SERPL CALC-MCNC: 53.8 MG/DL (ref 6–23)
WBC # BLD AUTO: 17.3 K/UL (ref 4.3–11.1)

## 2022-10-04 NOTE — ED NOTES
I have reviewed discharge instructions with the patient. The patient verbalized understanding. Patient left ED via Discharge Method: wheelchair to Home with . Opportunity for questions and clarification provided. Patient given 0 scripts. To continue your aftercare when you leave the hospital, you may receive an automated call from our care team to check in on how you are doing. This is a free service and part of our promise to provide the best care and service to meet your aftercare needs.  If you have questions, or wish to unsubscribe from this service please call [phone]. Thank you for Choosing our [facility] Emergency Department.           Ny Mcclure RN  10/03/22 2195

## 2022-10-04 NOTE — ED PROVIDER NOTES
[facility] Emergency Department Provider Note                   PCP:                SHALA Moore - DIPAK               Age: 69 y.o. Sex: female       ICD-10-CM    1. Injury of head, initial encounter  S09.90XA       2. Fall, initial encounter  [address] [place] [employer]           DISPOSITION Decision To Discharge 10/03/2022 10:41:45 PM         Orders Placed This Encounter   Procedures    XR HIP 2-3 VW W PELVIS RIGHT    CT HEAD WO CONTRAST        Milo Villanueva is a 69 y.o. female who presents to the Emergency Department with chief complaint of    Chief Complaint   Patient presents with    Fall      Patient is a 56-year-old female who presents this department with chief complaint of fall while at home. This occurred this afternoon. Found to the right hip and thinks she may have bit her head in the process. This was a mechanical fall. Pain is constant in nature, does not radiate. Worse with weightbearing. She denies loss of consciousness. She is able to ambulate after the fall. Denies any fever, chills, nausea or vomiting, use of blood thinners. The history is provided by the patient. No  was used. Review of Systems   Constitutional:  Negative for chills and fever. HENT:  Negative for dental problem. Respiratory:  Negative for cough. Gastrointestinal:  Negative for nausea and vomiting. Musculoskeletal:  Positive for arthralgias. Neurological:  Negative for headaches. All other systems reviewed and are negative.     Past Medical History:   Diagnosis Date    Anemia     oral Fe-- denies hx of blood transfusions    Anxiety     hx of anxiety    Cancer ([facility] [address]  dx 12/2014    vulvar cancer- surgical removal-  \"contained\" - no further treatment    Class 1 obesity with body mass index (BMI) of 32.0 to 32.9 in adult 6/20/2018    COPD (chronic obstructive pulmonary disease) ([facility] [address]     pt denies this dx-- smoked x 50 yrs 1ppd-- no inhalers- bronchitis 9/2015--rescue inhaler    Diabetic polyneuropathy associated with type 2 diabetes mellitus ([facility] 10/4/2017    Elevated liver function tests 10/8/2015    GERD (gastroesophageal reflux disease)     well controlled with med    Heart murmur     states dx \"when I was 21 yrs old\"-- not heard since- denies chest pain or syncope    HTN (hypertension)     managed  with med    Hyperlipidemia     Obesity (BMI 30-39. 9)     BMI 32.9    Smoking     1 pk/day x 50+ years    Type 2 diabetes mellitus ([facility]     SQBS normal 120-130, no hypo symptoms , does not check sugar    Vitamin B12 deficiency 11/22/2016    Vitamin D deficiency 6/30/2015        Past Surgical History:   Procedure Laterality Date    BREAST BIOPSY Right     negative    CAROTID ENDARTERECTOMY Right 06/13/2019    HEENT  2013     pylop removed from throat-- benign    HYSTERECTOMY (CERVIX STATUS UNKNOWN)  age 29    hysterectomy    HYSTERECTOMY (CERVIX STATUS UNKNOWN)  2/2015    VULVECTOMY MODIFIED RADICAL WITH BILATERAL GROIN NODE DISSECTION    SALPINGO-OOPHORECTOMY Right age 30    SALPINGO-OOPHORECTOMY Left age 26        Family History   Problem Relation Age of Onset    Asthma Brother     Diabetes Brother         Borderline    Cancer Sister     Breast Cancer Sister     Cancer Brother         Lung    Cancer Mother     Hypertension Mother     Migraines Mother     Heart Disease Father     Cancer Father         colon    Cancer Sister         breast    Diabetes Sister     Breast Cancer Sister 61        Social History     Socioeconomic History    Marital status:       Spouse name: None    Number of children: None    Years of education: None    Highest education level: None   Tobacco Use    Smoking status: Every Day     Packs/day: 1.00     Types: Cigarettes    Smokeless tobacco: Never   Substance and Sexual Activity    Alcohol use: No     Alcohol/week: 0.0 standard drinks    Drug use: No     Social Determinants of Health     Physical Activity: Inactive    Days of Exercise per Week: 0 days    Minutes of Exercise per Session: 0 min         Patient has no known allergies. Discharge Medication List as of 10/3/2022 10:44 PM        CONTINUE these medications which have NOT CHANGED    Details   ferrous sulfate (IRON 325) 325 (65 Fe) MG tablet Take 650 mg by mouth dailyHistorical Med      aspirin 81 MG EC tablet Take 81 mg by mouth dailyHistorical Med      cyanocobalamin 2000 MCG tablet Take 1 tablet by mouth dailyHistorical Med      ascorbic acid (VITAMIN C) 250 MG tablet Take 250 mg by mouth dailyHistorical Med      albuterol sulfate HFA (PROVENTIL;VENTOLIN;PROAIR) 108 (90 Base) MCG/ACT inhaler Inhale 1 puff into the lungs every 6 hours as needed for Wheezing or Shortness of Breath, Disp-18 g, R-2Normal      amLODIPine (NORVASC) 5 MG tablet Take 1 tablet by mouth daily, Disp-30 tablet, R-2Normal      famotidine (PEPCID) 20 MG tablet Take 1 tablet by mouth daily as needed (heartburn), Disp-30 tablet, R-5Normal      fosinopril (MONOPRIL) 40 MG tablet Take 1 tablet by mouth daily, Disp-30 tablet, R-2Normal      metFORMIN (GLUCOPHAGE) 1000 MG tablet Take 1 tablet by mouth 2 times daily, Disp-60 tablet, R-5Normal      rosuvastatin (CRESTOR) 10 MG tablet Take 1 tablet by mouth daily, Disp-30 tablet, R-2Normal      insulin glargine (LANTUS SOLOSTAR) 100 UNIT/ML injection pen Inject 10 Units into the skin nightly, Disp-5 Adjustable Dose Pre-filled Pen Syringe, R-3Normal      Insulin Pen Needle 32G X 6 MM MISC Disp-100 each, R-11, NormalFor insulin injections once a day at night.      triamterene-hydroCHLOROthiazide (MAXZIDE-25) 37.5-25 MG per tablet Take 0.5 tablets by mouth daily, Disp-30 tablet, R-2Her dose was decreased to 1/2 tablet daily due to orthostasis. Normal      glucose monitoring (FREESTYLE FREEDOM) kit DAILY Starting Mon 10/3/2022, Disp-1 kit, R-0, NormalMeter of choice per insurance and patient. blood glucose monitor strips Test 2 times a day & as needed for symptoms of irregular blood glucose.  Dispense sufficient amount for indicated testing frequency plus additional to accommodate PRN testing needs. , Disp-100 strip, R-11, Normal      Lancets MISC Disp-100 each, R-11, NormalSig: Test 2 times a day & as needed for symptoms of irregular blood glucose. Dispense sufficient amount for indicated testing frequency plus additional to accommodate PRN testing needs. Vitals signs and nursing note reviewed. No data found. Physical Exam  Vitals and nursing note reviewed. Constitutional:       General: She is not in acute distress. Appearance: Normal appearance. She is normal weight. She is not ill-appearing, toxic-appearing or diaphoretic. HENT:      Head: Normocephalic and atraumatic. Nose: Nose normal.      Mouth/Throat:      Mouth: Mucous membranes are moist.   Eyes:      Pupils: Pupils are equal, round, and reactive to light. Cardiovascular:      Rate and Rhythm: Normal rate. Pulmonary:      Effort: Pulmonary effort is normal.   Abdominal:      General: Abdomen is flat. Palpations: Abdomen is soft. Musculoskeletal:         General: Tenderness (mild hip) present. Skin:     General: Skin is warm and dry. Neurological:      General: No focal deficit present. Mental Status: She is alert. Psychiatric:         Mood and Affect: Mood normal.        MDM  Number of Diagnoses or Management Options  Fall, initial encounter: minor  Injury of head, initial encounter: minor  Diagnosis management comments: Work-up is unremarkable here. No acute findings on head CT or x-ray of pelvis. She will follow-up with primary care. Risk of Complications, Morbidity, and/or Mortality  Presenting problems: moderate  Diagnostic procedures: moderate  Management options: moderate    Patient Progress  Patient progress: stable      Procedures      Labs Reviewed - No data to display     CT HEAD WO CONTRAST   Final Result   1. No CT evidence of acute intracranial process.          XR HIP 2-3 VW W PELVIS RIGHT   Final Result   No displaced fracture. Voice dictation software was used during the making of this note. This software is not perfect and grammatical and other typographical errors may be present. This note has not been completely proofread for errors.      Radha Ho  10/03/22 [id number]

## 2022-10-07 PROBLEM — D72.829 LEUCOCYTOSIS: Status: ACTIVE | Noted: 2022-10-07

## 2022-10-07 NOTE — RESULT ENCOUNTER NOTE
Triglycerides are elevated. Vitamin D is low. Recommend she take vitamin D at least 2000 units daily and make sure she is get at least 1200 millroom of calcium with supplement and or food. Avoid fried, fast, or processed foods and foods high in saturated fat/cholesterol. Consume dietary fiber 20-30 grams daily. Exercise 150 minutes/week. Her red blood cells are large may be due to her sleep apnea. Patient's white count is significantly elevated at 17.3. She did not report any signs or symptoms of illness. How is she feeling? Any concerns for illness? We will recheck her labs when she comes in next month.

## 2022-10-11 ENCOUNTER — TELEPHONE (OUTPATIENT)
Dept: FAMILY MEDICINE CLINIC | Facility: CLINIC | Age: 77
End: 2022-10-11

## 2022-10-11 NOTE — TELEPHONE ENCOUNTER
Patient would like a call back about her blood sugar issues. The new medication that she started on is causing issues.  Please advise

## 2022-10-17 ENCOUNTER — TELEPHONE (OUTPATIENT)
Dept: FAMILY MEDICINE CLINIC | Facility: CLINIC | Age: 77
End: 2022-10-17

## 2022-10-17 NOTE — TELEPHONE ENCOUNTER
Pt stated Lantus 10 U is not working. Fast blood sugar this morning was 202 this am and later BS was 264. Pt states she is \"swimmy headed\" and can feel when blood sugar is up and down. Please advise.

## 2022-11-03 ENCOUNTER — TELEMEDICINE (OUTPATIENT)
Dept: FAMILY MEDICINE CLINIC | Facility: CLINIC | Age: 77
End: 2022-11-03
Payer: MEDICARE

## 2022-11-03 DIAGNOSIS — E11.42 DIABETIC POLYNEUROPATHY ASSOCIATED WITH TYPE 2 DIABETES MELLITUS (HCC): Primary | ICD-10-CM

## 2022-11-03 DIAGNOSIS — E16.2 HYPOGLYCEMIA: ICD-10-CM

## 2022-11-03 PROCEDURE — 99443 PR PHYS/QHP TELEPHONE EVALUATION 21-30 MIN: CPT | Performed by: NURSE PRACTITIONER

## 2022-11-03 RX ORDER — FLASH GLUCOSE SENSOR
KIT MISCELLANEOUS
Qty: 2 EACH | Refills: 11 | Status: SHIPPED | OUTPATIENT
Start: 2022-11-03

## 2022-11-03 RX ORDER — GLUCAGON INJECTION, SOLUTION 1 MG/.2ML
1 INJECTION, SOLUTION SUBCUTANEOUS PRN
Qty: 1 EACH | Refills: 1 | Status: SHIPPED | OUTPATIENT
Start: 2022-11-03

## 2022-11-03 RX ORDER — INSULIN GLARGINE 100 [IU]/ML
16 INJECTION, SOLUTION SUBCUTANEOUS NIGHTLY
Qty: 5 ADJUSTABLE DOSE PRE-FILLED PEN SYRINGE | Refills: 3 | Status: SHIPPED | OUTPATIENT
Start: 2022-11-03

## 2022-11-03 RX ORDER — INSULIN ASPART 100 [IU]/ML
INJECTION, SOLUTION INTRAVENOUS; SUBCUTANEOUS
Qty: 5 ADJUSTABLE DOSE PRE-FILLED PEN SYRINGE | Refills: 3 | Status: SHIPPED | OUTPATIENT
Start: 2022-11-03

## 2022-11-03 RX ORDER — FLASH GLUCOSE SCANNING READER
EACH MISCELLANEOUS
Qty: 1 EACH | Refills: 0 | Status: SHIPPED | OUTPATIENT
Start: 2022-11-03

## 2022-11-03 SDOH — ECONOMIC STABILITY: FOOD INSECURITY: WITHIN THE PAST 12 MONTHS, THE FOOD YOU BOUGHT JUST DIDN'T LAST AND YOU DIDN'T HAVE MONEY TO GET MORE.: NEVER TRUE

## 2022-11-03 SDOH — ECONOMIC STABILITY: FOOD INSECURITY: WITHIN THE PAST 12 MONTHS, YOU WORRIED THAT YOUR FOOD WOULD RUN OUT BEFORE YOU GOT MONEY TO BUY MORE.: NEVER TRUE

## 2022-11-03 SDOH — ECONOMIC STABILITY: TRANSPORTATION INSECURITY
IN THE PAST 12 MONTHS, HAS THE LACK OF TRANSPORTATION KEPT YOU FROM MEDICAL APPOINTMENTS OR FROM GETTING MEDICATIONS?: NO

## 2022-11-03 SDOH — ECONOMIC STABILITY: TRANSPORTATION INSECURITY
IN THE PAST 12 MONTHS, HAS LACK OF TRANSPORTATION KEPT YOU FROM MEETINGS, WORK, OR FROM GETTING THINGS NEEDED FOR DAILY LIVING?: NO

## 2022-11-03 ASSESSMENT — PATIENT HEALTH QUESTIONNAIRE - PHQ9
2. FEELING DOWN, DEPRESSED OR HOPELESS: 0
SUM OF ALL RESPONSES TO PHQ9 QUESTIONS 1 & 2: 0
SUM OF ALL RESPONSES TO PHQ QUESTIONS 1-9: 0
1. LITTLE INTEREST OR PLEASURE IN DOING THINGS: 0
SUM OF ALL RESPONSES TO PHQ QUESTIONS 1-9: 0

## 2022-11-03 ASSESSMENT — ENCOUNTER SYMPTOMS: SHORTNESS OF BREATH: 0

## 2022-11-03 ASSESSMENT — SOCIAL DETERMINANTS OF HEALTH (SDOH): HOW HARD IS IT FOR YOU TO PAY FOR THE VERY BASICS LIKE FOOD, HOUSING, MEDICAL CARE, AND HEATING?: NOT HARD AT ALL

## 2022-11-03 NOTE — PROGRESS NOTES
Daron Cadet is a 69 y.o. female who presents today for the following:  Chief Complaint   Patient presents with    Diabetes       No Known Allergies    Current Outpatient Medications   Medication Sig Dispense Refill    ferrous sulfate (IRON 325) 325 (65 Fe) MG tablet Take 650 mg by mouth daily      aspirin 81 MG EC tablet Take 81 mg by mouth daily      cyanocobalamin 2000 MCG tablet Take 1 tablet by mouth daily      ascorbic acid (VITAMIN C) 250 MG tablet Take 250 mg by mouth daily      albuterol sulfate HFA (PROVENTIL;VENTOLIN;PROAIR) 108 (90 Base) MCG/ACT inhaler Inhale 1 puff into the lungs every 6 hours as needed for Wheezing or Shortness of Breath 18 g 2    amLODIPine (NORVASC) 5 MG tablet Take 1 tablet by mouth daily 30 tablet 2    famotidine (PEPCID) 20 MG tablet Take 1 tablet by mouth daily as needed (heartburn) 30 tablet 5    fosinopril (MONOPRIL) 40 MG tablet Take 1 tablet by mouth daily 30 tablet 2    metFORMIN (GLUCOPHAGE) 1000 MG tablet Take 1 tablet by mouth 2 times daily 60 tablet 5    rosuvastatin (CRESTOR) 10 MG tablet Take 1 tablet by mouth daily 30 tablet 2    insulin glargine (LANTUS SOLOSTAR) 100 UNIT/ML injection pen Inject 10 Units into the skin nightly 5 Adjustable Dose Pre-filled Pen Syringe 3    Insulin Pen Needle 32G X 6 MM MISC For insulin injections once a day at night. 100 each 11    triamterene-hydroCHLOROthiazide (MAXZIDE-25) 37.5-25 MG per tablet Take 0.5 tablets by mouth daily 30 tablet 2    glucose monitoring (FREESTYLE FREEDOM) kit 1 kit by Does not apply route daily Meter of choice per insurance and patient. 1 kit 0    blood glucose monitor strips Test 2 times a day & as needed for symptoms of irregular blood glucose. Dispense sufficient amount for indicated testing frequency plus additional to accommodate PRN testing needs. 100 strip 11    Lancets MISC Sig: Test 2 times a day & as needed for symptoms of irregular blood glucose.  Dispense sufficient amount for indicated testing frequency plus additional to accommodate PRN testing needs. 100 each 11     No current facility-administered medications for this visit. Past Medical History:   Diagnosis Date    Anemia     oral Fe-- denies hx of blood transfusions    Anxiety     hx of anxiety    Cancer ([facility]  dx 12/2014    vulvar cancer- surgical removal-  \"contained\" - no further treatment    Class 1 obesity with body mass index (BMI) of 32.0 to 32.9 in adult 6/20/2018    COPD (chronic obstructive pulmonary disease) ([facility]     pt denies this dx-- smoked x 50 yrs 1ppd-- no inhalers- bronchitis 9/2015--rescue inhaler    Diabetic polyneuropathy associated with type 2 diabetes mellitus ([facility] 10/4/2017    Elevated liver function tests 10/8/2015    GERD (gastroesophageal reflux disease)     well controlled with med    Heart murmur     states dx \"when I was 21 yrs old\"-- not heard since- denies chest pain or syncope    HTN (hypertension)     managed  with med    Hyperlipidemia     Obesity (BMI 30-39. 9)     BMI 32.9    Smoking     1 pk/day x 50+ years    Type 2 diabetes mellitus (HCC)     SQBS normal 120-130, no hypo symptoms , does not check sugar    Vitamin B12 deficiency 11/22/2016    Vitamin D deficiency 6/30/2015       Past Surgical History:   Procedure Laterality Date    BREAST BIOPSY Right     negative    CAROTID ENDARTERECTOMY Right 06/13/2019    HEENT  2013     pylop removed from throat-- benign    HYSTERECTOMY (CERVIX STATUS UNKNOWN)  age 29    hysterectomy    HYSTERECTOMY (CERVIX STATUS UNKNOWN)  2/2015    VULVECTOMY MODIFIED RADICAL WITH BILATERAL GROIN NODE DISSECTION    SALPINGO-OOPHORECTOMY Right age 30    SALPINGO-OOPHORECTOMY Left age 26       Social History     Tobacco Use    Smoking status: Every Day     Packs/day: 1.00     Types: Cigarettes    Smokeless tobacco: Never   Substance Use Topics    Alcohol use: No     Alcohol/week: 0.0 standard drinks        Family History   Problem Relation Age of Onset    Asthma Brother Diabetes Brother         Borderline    Cancer Sister     Breast Cancer Sister     Cancer Brother         Lung    Cancer Mother     Hypertension Mother     Migraines Mother     Heart Disease Father     Cancer Father         colon    Cancer Sister         breast    Diabetes Sister     Breast Cancer Sister 61       HPI     Review of Systems     There were no vitals taken for this visit. Physical Exam     {There are no diagnoses linked to this encounter. (Refresh or delete this SmartLink)}     Patient informed, we will call with blood work results within one week. If you have not heard regarding results in over a week, please contact office. You can also review results on [employer].            SHALA Cherry - CNP

## 2022-11-03 NOTE — PROGRESS NOTES
Lilly Verdin is a 69 y.o. female, evaluated via audio-only technology on 11/3/2022 for Diabetes  Pt is accompanied by her son on speaker phone who also has diabetes. Home glucoses:  10/4:  364  10/9: 253  10/14: 195 and 222  This am 205 fasting  Reports compliance with Lantus 14 units. Glucoses after she eats [phone]. Rarely going under 200. Pt working on diabetic diet. Requesting continuous glucose monitor system d/t multiple finger sticks. No hypoglycemic episodes. Compliance with her medications. Assessment & Plan:   Annette Dunaway was seen today for diabetes. Diagnoses and all orders for this visit:    Diabetic polyneuropathy associated with type 2 diabetes mellitus (HCC)  -     insulin glargine (LANTUS SOLOSTAR) 100 UNIT/ML injection pen; Inject 16 Units into the skin nightly Increase by 1 unit nightly until fasting morning blood sugar reaches <150 and stay at that dose up to maximum daily dose 20 units.  -     Continuous Blood Gluc Sensor (FREESTYLE LES 2 SENSOR) MISC; For diabetes Type 2 uncontrolled glucose monitoring.  -     Insulin Pen Needle 32G X 6 MM MISC; For insulin injections up to 4 times a day. -     Continuous Blood Gluc  (FREESTYLE LES 2 READER) NICKY; For diabetes Type 2 uncontrolled glucose monitoring. Hypoglycemia  -     Glucagon (GVOKE HYPOPEN 2-PACK) 1 MG/0.2ML SOAJ; Inject 1 mg into the skin as needed (severe hypoglycemia) May repeat dose x 1 after 15 minutes. Other orders  -     insulin aspart (NOVOLOG FLEXPEN) 100 UNIT/ML injection pen; Correction Scale three times daily SQ before meals for fasting glucose:   Less than 150 =   0 units      150 -199 =   2 units  200 -249 = 4 units  250 -299 = 6 units 300 -349 =  8 units 350 and above = 10 units and Call MD Max daily dose 30 units. Initiate hypoglycemia protocol if blood sugar is <70     Follow up 2 weeks.   12  Subjective:       Prior to Admission medications    Medication Sig Start Date End Date Taking? Authorizing Provider   insulin glargine (LANTUS SOLOSTAR) 100 UNIT/ML injection pen Inject 16 Units into the skin nightly Increase by 1 unit nightly until fasting morning blood sugar reaches <150 and stay at that dose up to maximum daily dose 20 units. 11/3/22  Yes SHALA Romeo CNP   insulin aspart (NOVOLOG FLEXPEN) 100 UNIT/ML injection pen Correction Scale three times daily SQ before meals for fasting glucose:   Less than 150 =   0 units      150 -199 =   2 units  200 -249 = 4 units  250 -299 = 6 units 300 -349 =  8 units 350 and above = 10 units and Call MD Max daily dose 30 units. Initiate hypoglycemia protocol if blood sugar is <70 11/3/22  Yes SHALA Romeo CNP   Continuous Blood Gluc Sensor (FREESTYLE LES 2 SENSOR) MISC For diabetes Type 2 uncontrolled glucose monitoring. 11/3/22  Yes SHALA Romeo CNP   Insulin Pen Needle 32G X 6 MM MISC For insulin injections up to 4 times a day. 11/3/22  Yes SHALA Romeo CNP   Continuous Blood Gluc  (FREESTYLE LES 2 READER) NICKY For diabetes Type 2 uncontrolled glucose monitoring. 11/3/22  Yes SHALA Romeo CNP   Glucagon (GVOKE HYPOPEN 2-PACK) 1 MG/0.2ML SOAJ Inject 1 mg into the skin as needed (severe hypoglycemia) May repeat dose x 1 after 15 minutes.  11/3/22  Yes SHALA Romeo CNP   ferrous sulfate (IRON 325) 325 (65 Fe) MG tablet Take 650 mg by mouth daily 9/23/20  Yes Historical Provider, MD   aspirin 81 MG EC tablet Take 81 mg by mouth daily   Yes Historical Provider, MD   cyanocobalamin 2000 MCG tablet Take 1 tablet by mouth daily   Yes Historical Provider, MD   ascorbic acid (VITAMIN C) 250 MG tablet Take 250 mg by mouth daily   Yes Historical Provider, MD   albuterol sulfate HFA (PROVENTIL;VENTOLIN;PROAIR) 108 (90 Base) MCG/ACT inhaler Inhale 1 puff into the lungs every 6 hours as needed for Wheezing or Shortness of Breath 10/3/22  Yes SHALA Romeo CNP   amLODIPine (NORVASC) 5 MG tablet Take and time. Psychiatric:         Mood and Affect: Mood normal.         Behavior: Behavior normal.         Thought Content: Thought content normal.        Patient-Reported Vitals  No data recorded      Lakeshia Smith was evaluated through a patient-initiated, synchronous (real-time) audio only encounter. She (or guardian if applicable) is aware that it is a billable service, which includes applicable co-pays, with coverage as determined by her insurance carrier. This visit was conducted with the patient's (and/or Damien Braun guardian's) verbal consent. She has not had a related appointment within my department in the past 7 days or scheduled within the next 24 hours. The patient was located in a state where the provider was licensed to provide care. Total Time: 21 minutes.     SHALA Hess CNP     Provider located at home   Patient provider at home

## 2022-11-07 ENCOUNTER — TELEPHONE (OUTPATIENT)
Dept: FAMILY MEDICINE CLINIC | Facility: CLINIC | Age: 77
End: 2022-11-07

## 2022-11-07 NOTE — TELEPHONE ENCOUNTER
Spoke with pt, pt states she needs a prior auth for the freestyle magdalena meter. Prior auth done today (11-7-22) for meter.

## 2022-11-11 ENCOUNTER — TELEPHONE (OUTPATIENT)
Dept: FAMILY MEDICINE CLINIC | Facility: CLINIC | Age: 77
End: 2022-11-11

## 2022-11-11 NOTE — TELEPHONE ENCOUNTER
Spoke with pt son Young Melo, advised prior Romario Beverage was done on 11-7-22 for the free-style matthew and per cover my meds its been denied. Son will call pt ins to find out why and see what they will cover and let our office know.

## 2022-11-11 NOTE — TELEPHONE ENCOUNTER
Pt son Nataly Paredes called pt ins and son states that pt ins will cover dexacom g6 meter, transmittal (for 12 months) and sensors (3 in a box) for 12 months. Son is requesting a year supply.

## 2022-11-14 DIAGNOSIS — E11.649 UNCONTROLLED TYPE 2 DIABETES MELLITUS WITH HYPOGLYCEMIA WITHOUT COMA (HCC): Primary | ICD-10-CM

## 2022-11-14 RX ORDER — BLOOD-GLUCOSE,RECEIVER,CONT
EACH MISCELLANEOUS
Qty: 1 EACH | Refills: 1 | Status: SHIPPED | OUTPATIENT
Start: 2022-11-14

## 2022-11-14 RX ORDER — BLOOD-GLUCOSE SENSOR
EACH MISCELLANEOUS
Qty: 3 EACH | Refills: 10 | Status: SHIPPED | OUTPATIENT
Start: 2022-11-14

## 2022-11-14 RX ORDER — BLOOD-GLUCOSE TRANSMITTER
EACH MISCELLANEOUS
Qty: 1 EACH | Refills: 1 | Status: SHIPPED | OUTPATIENT
Start: 2022-11-14

## 2022-11-14 NOTE — TELEPHONE ENCOUNTER
Unable to leave a message on pt son phone, Tonny Dash is full. LVM on pt home #, advised of Herlinda message.

## 2022-11-22 ENCOUNTER — TELEPHONE (OUTPATIENT)
Dept: FAMILY MEDICINE CLINIC | Facility: CLINIC | Age: 77
End: 2022-11-22

## 2022-11-22 NOTE — TELEPHONE ENCOUNTER
Pt called requesting to speak with someone about the multiple times he has call the office and no one has follow up with him nor he has heard anything about the authorization for his mother to receive a meter. I tried to get someone to  the call but medical staff are currently assisting and caring for patients. Patient's son was very upset and told me I better hear for someone today I am a state agent so If I don't answer I have a lot of cases and they can leave me a voicemail. Patient son told me he has sent these documents over 2 weeks and nothing has been done. Pt son also told me I go see my endocrinologist and they give me what I want no begging so your doctor is not doing nothing right. I apologize to the patients son multiple times and I told patient's son repeatedly I will get this message to someone and that everyone in here will do their best but he was not happy at all about the situation.

## 2022-11-23 ENCOUNTER — OFFICE VISIT (OUTPATIENT)
Dept: FAMILY MEDICINE CLINIC | Facility: CLINIC | Age: 77
End: 2022-11-23
Payer: MEDICARE

## 2022-11-23 VITALS
TEMPERATURE: 98.3 F | HEIGHT: 59 IN | HEART RATE: 99 BPM | DIASTOLIC BLOOD PRESSURE: 62 MMHG | SYSTOLIC BLOOD PRESSURE: 106 MMHG | BODY MASS INDEX: 25.6 KG/M2 | WEIGHT: 127 LBS | OXYGEN SATURATION: 96 %

## 2022-11-23 DIAGNOSIS — E11.42 DIABETIC POLYNEUROPATHY ASSOCIATED WITH TYPE 2 DIABETES MELLITUS (HCC): ICD-10-CM

## 2022-11-23 DIAGNOSIS — E11.649 UNCONTROLLED TYPE 2 DIABETES MELLITUS WITH HYPOGLYCEMIA WITHOUT COMA (HCC): ICD-10-CM

## 2022-11-23 DIAGNOSIS — I10 ESSENTIAL HYPERTENSION: Primary | ICD-10-CM

## 2022-11-23 DIAGNOSIS — E66.3 OVERWEIGHT (BMI 25.0-29.9): ICD-10-CM

## 2022-11-23 LAB — GLUCOSE, POC: 287 MG/DL

## 2022-11-23 PROCEDURE — 3078F DIAST BP <80 MM HG: CPT | Performed by: NURSE PRACTITIONER

## 2022-11-23 PROCEDURE — 3074F SYST BP LT 130 MM HG: CPT | Performed by: NURSE PRACTITIONER

## 2022-11-23 PROCEDURE — G8484 FLU IMMUNIZE NO ADMIN: HCPCS | Performed by: NURSE PRACTITIONER

## 2022-11-23 PROCEDURE — G8399 PT W/DXA RESULTS DOCUMENT: HCPCS | Performed by: NURSE PRACTITIONER

## 2022-11-23 PROCEDURE — 82962 GLUCOSE BLOOD TEST: CPT | Performed by: NURSE PRACTITIONER

## 2022-11-23 PROCEDURE — G8417 CALC BMI ABV UP PARAM F/U: HCPCS | Performed by: NURSE PRACTITIONER

## 2022-11-23 PROCEDURE — 1090F PRES/ABSN URINE INCON ASSESS: CPT | Performed by: NURSE PRACTITIONER

## 2022-11-23 PROCEDURE — 4004F PT TOBACCO SCREEN RCVD TLK: CPT | Performed by: NURSE PRACTITIONER

## 2022-11-23 PROCEDURE — 99213 OFFICE O/P EST LOW 20 MIN: CPT | Performed by: NURSE PRACTITIONER

## 2022-11-23 PROCEDURE — G8428 CUR MEDS NOT DOCUMENT: HCPCS | Performed by: NURSE PRACTITIONER

## 2022-11-23 PROCEDURE — 1123F ACP DISCUSS/DSCN MKR DOCD: CPT | Performed by: NURSE PRACTITIONER

## 2022-11-23 ASSESSMENT — PATIENT HEALTH QUESTIONNAIRE - PHQ9
SUM OF ALL RESPONSES TO PHQ QUESTIONS 1-9: 0
2. FEELING DOWN, DEPRESSED OR HOPELESS: 0
1. LITTLE INTEREST OR PLEASURE IN DOING THINGS: 0
SUM OF ALL RESPONSES TO PHQ9 QUESTIONS 1 & 2: 0

## 2022-11-23 ASSESSMENT — ENCOUNTER SYMPTOMS
GASTROINTESTINAL NEGATIVE: 1
RESPIRATORY NEGATIVE: 1

## 2022-11-23 NOTE — PROGRESS NOTES
Lilly Verdin is a 69 y.o. female who presents today for the following:  Chief Complaint   Patient presents with    Diabetes         No Known Allergies    Current Outpatient Medications   Medication Sig Dispense Refill    Continuous Blood Gluc Transmit (DEXCOM G6 TRANSMITTER) MISC For DM Type 2 insulin requiring uncontrolled. 1 each 1    Continuous Blood Gluc  ([address]) NICKY For uncontrolled DM Type 2 insulin requiring. 1 each 1    Continuous Blood Gluc Sensor (DEXCOM G6 SENSOR) MISC For uncontrolled Diabetes Type 2 insulin requiring. 3 each 10    insulin glargine (LANTUS SOLOSTAR) 100 UNIT/ML injection pen Inject 16 Units into the skin nightly Increase by 1 unit nightly until fasting morning blood sugar reaches <150 and stay at that dose up to maximum daily dose 20 units. 5 Adjustable Dose Pre-filled Pen Syringe 3    insulin aspart (NOVOLOG FLEXPEN) 100 UNIT/ML injection pen Correction Scale three times daily SQ before meals for fasting glucose:   Less than 150 =   0 units      150 -199 =   2 units  200 -249 = 4 units  250 -299 = 6 units 300 -349 =  8 units 350 and above = 10 units and Call MD Max daily dose 30 units. Initiate hypoglycemia protocol if blood sugar is <70 5 Adjustable Dose Pre-filled Pen Syringe 3    Continuous Blood Gluc Sensor (FREESTYLE LES 2 SENSOR) MISC For diabetes Type 2 uncontrolled glucose monitoring. 2 each 11    Insulin Pen Needle 32G X 6 MM MISC For insulin injections up to 4 times a day. 200 each 11    Continuous Blood Gluc  (FREESTYLE LES 2 READER) NICKY For diabetes Type 2 uncontrolled glucose monitoring. 1 each 0    Glucagon (GVOKE HYPOPEN 2-PACK) 1 MG/0.2ML SOAJ Inject 1 mg into the skin as needed (severe hypoglycemia) May repeat dose x 1 after 15 minutes.  1 each 1    ferrous sulfate (IRON 325) 325 (65 Fe) MG tablet Take 650 mg by mouth daily      aspirin 81 MG EC tablet Take 81 mg by mouth daily      cyanocobalamin 2000 MCG tablet Take 1 tablet by mouth daily      ascorbic acid (VITAMIN C) 250 MG tablet Take 250 mg by mouth daily      albuterol sulfate HFA (PROVENTIL;VENTOLIN;PROAIR) 108 (90 Base) MCG/ACT inhaler Inhale 1 puff into the lungs every 6 hours as needed for Wheezing or Shortness of Breath 18 g 2    amLODIPine (NORVASC) 5 MG tablet Take 1 tablet by mouth daily 30 tablet 2    famotidine (PEPCID) 20 MG tablet Take 1 tablet by mouth daily as needed (heartburn) 30 tablet 5    fosinopril (MONOPRIL) 40 MG tablet Take 1 tablet by mouth daily 30 tablet 2    metFORMIN (GLUCOPHAGE) 1000 MG tablet Take 1 tablet by mouth 2 times daily 60 tablet 5    rosuvastatin (CRESTOR) 10 MG tablet Take 1 tablet by mouth daily 30 tablet 2    triamterene-hydroCHLOROthiazide (MAXZIDE-25) 37.5-25 MG per tablet Take 0.5 tablets by mouth daily 30 tablet 2    glucose monitoring (FREESTYLE FREEDOM) kit 1 kit by Does not apply route daily Meter of choice per insurance and patient. 1 kit 0    blood glucose monitor strips Test 2 times a day & as needed for symptoms of irregular blood glucose. Dispense sufficient amount for indicated testing frequency plus additional to accommodate PRN testing needs. 100 strip 11    Lancets MISC Sig: Test 2 times a day & as needed for symptoms of irregular blood glucose. Dispense sufficient amount for indicated testing frequency plus additional to accommodate PRN testing needs. 100 each 11     No current facility-administered medications for this visit.        Past Medical History:   Diagnosis Date    Anemia     oral Fe-- denies hx of blood transfusions    Anxiety     hx of anxiety    Can[facility] ([facility]  dx 12/2014    vulvar cancer- surgical removal-  \"contained\" - no further treatment    Class 1 obesity with body mass index (BMI) of 32.0 to 32.9 in adult 6/20/2018    COPD (chronic obstructive pulmonary disease) ([facility]     pt denies this dx-- smoked x 50 yrs 1ppd-- no inhalers- bronchitis 9/2015--rescue inhaler    Diabetic polyneuropathy associated with type 2 diabetes mellitus ([facility] 10/4/2017    Elevated liver function tests 10/8/2015    GERD (gastroesophageal reflux disease)     well controlled with med    Heart murmur     states dx \"when I was 21 yrs old\"-- not heard since- denies chest pain or syncope    HTN (hypertension)     managed  with med    Hyperlipidemia     Obesity (BMI 30-39. 9)     BMI 32.9    Smoking     1 pk/day x 50+ years    Type 2 diabetes mellitus (HCC)     SQBS normal 120-130, no hypo symptoms , does not check sugar    Vitamin B12 deficiency 11/22/2016    Vitamin D deficiency 6/30/2015       Past Surgical History:   Procedure Laterality Date    BREAST BIOPSY Right     negative    CAROTID ENDARTERECTOMY Right 06/13/2019    HEENT  2013     pylop removed from throat-- benign    HYSTERECTOMY ([facility])  age 29    hysterectomy    HYSTERECTOMY (CERVIX STATUS UNKNOWN)  2/2015    VULVECTOMY MODIFIED RADICAL WITH BILATERAL GROIN NODE DISSECTION    SALPINGO-OOPHORECTOMY Right age 30    SALPINGO-OOPHORECTOMY Left age 26       Social History     Tobacco Use    Smoking status: Every Day     Packs/day: 1.00     Types: Cigarettes    Smokeless tobacco: Never   Substance Use Topics    Alcohol use: No     Alcohol/week: 0.0 standard drinks        Family History   Problem Relation Age of Onset    Asthma Brother     Diabetes Brother         Borderline    Cancer Sister     Breast Cancer Sister     Cancer Brother         Lung    Cancer Mother     Hypertension Mother     Migraines Mother     Heart Disease Father     Cancer Father         colon    Cancer Sister         breast    Diabetes Sister     Breast Cancer Sister 61       HPI   Pt presents for two week f/u of her Type 2 DM. She has uncontrolled diabetes with A1c 10.1 noted on 10/03/22.   She has history of bilateral carotid stenosis status post CEA, hypertension, diabetes type 2 with polyneuropathy, PVD, COPD, GHADA, vulvar cancer status post total hysterectomy with BSO, anxiety, obesity, elevated liver enzymes, hyperlipidemia, smoking, vitamin D deficiency, osteopenia, breast bx, and vitamin B12 deficiency. Followed by oncology, sleep medicine, and vascular surgery. Drinking unsweet coffee and teas now. She states she hungry, doesn't care anymore, she is tired, she is going to eat what she wants and doesn't care if her sugar goes up to 1000. She is frustrated that she is tired, had COVID-19 recently, and now has to take insulin shots and not eating her macaroni that she likes. She is not doing her sliding scale because her sugar is too low. Her home glucose log ranged 122-282 recently. She has not increased her Lantus and is taking it in the morning at 16 units. She is afraid her glucose will drop and she will die. She is frustrated because she hasn't gotten her Free [facility] waiting on paperwork to be completed. Have not received paperwork from [employer]. Declines DTC referral, states her son and  were diabetic and she had to prepare their meals and shop for them. Review of Systems   Constitutional:  Positive for fatigue and unexpected weight change. Eyes:  Positive for visual disturbance. Respiratory: Negative. Cardiovascular: Negative. Gastrointestinal: Negative. Genitourinary: Negative. Neurological:  Negative for dizziness and headaches. Psychiatric/Behavioral: Negative. /62   Pulse 99   Temp 98.3 °F (36.8 °C)   Ht 4' 11\" (1.499 m)   Wt 127 lb (57.6 kg)   SpO2 96%   BMI 25.65 kg/m²     Physical Exam  Constitutional:       General: She is not in acute distress. Appearance: Normal appearance. She is normal weight. She is not ill-appearing. HENT:      Head: Normocephalic and atraumatic. Right Ear: External ear normal.      Left Ear: External ear normal.   Eyes:      Extraocular Movements: Extraocular movements intact. Conjunctiva/sclera: Conjunctivae normal.      Pupils: Pupils are equal, round, and reactive to light. Cardiovascular:      Rate and Rhythm: Normal rate and regular rhythm. Pulses: Normal pulses. Heart sounds: Normal heart sounds. Pulmonary:      Effort: Pulmonary effort is normal.      Breath sounds: Normal breath sounds. Abdominal:      General: There is no distension. Musculoskeletal:         General: Normal range of motion. Cervical back: Normal range of motion and neck supple. Right lower leg: No edema. Left lower leg: No edema. Skin:     General: Skin is warm and dry. Coloration: Skin is not jaundiced or pale. Neurological:      General: No focal deficit present. Mental Status: She is alert and oriented to person, place, and time. Psychiatric:         Mood and Affect: Mood normal.         Behavior: Behavior normal.         Thought Content: Thought content normal.         Judgment: Judgment normal.        1. Essential hypertension  2. Diabetic polyneuropathy associated with type 2 diabetes mellitus (HCC)  -     AMB POC GLUCOSE BLOOD, BY GLUCOSE MONITORING DEVICE  3. Overweight (BMI 25.0-29.9)  4. Uncontrolled type 2 diabetes mellitus with hypoglycemia without coma ([facility]     Plan f/u 01/03/23 to recheck A1c. Encouraged patient to continue diabetic diet and to eat very small portions of the things that she likes and to have no more than 1-2 very small servings of starch with meal focusing more on non starchy vegetables half of her plate, protein a quarter of her plate, and starch very small a quarter of her plate. Discussed avoiding sugar and sweetened beverages. Received fax from [place] and paperwork completed and sent back regarding her diabetic supplies. Patient agreeable to take Lantus as prescribed increasing by 1 unit daily if her glucose is greater than 150 fasting in the morning up until 20 units max. If she reaches glucose <150 stay at the dose she reaches. Encourage patient to stay active and watch portion sizes.   Her blood pressure is stable. Patient informed, we will call with blood work results within one week. If you have not heard regarding results in over a week, please contact office. You can also review results on [employer]. Follow-up and Dispositions    Return in about 2 months (around 1/23/2023) for Diabetes-EXTENDED please.          SHALA Romeo - CNP

## 2022-12-09 ENCOUNTER — TELEPHONE (OUTPATIENT)
Dept: FAMILY MEDICINE CLINIC | Facility: CLINIC | Age: 77
End: 2022-12-09

## 2022-12-09 NOTE — TELEPHONE ENCOUNTER
Re-faxed diabetic detailed written order to [facility] ([phone]) with question #6 filled in by provider (# of refills). Advised pt son Natalya March and his wife Margo Scott that original was sent off on 11-23-22 and re-faxed today (12-9-22) with correction.

## 2023-01-14 DIAGNOSIS — E11.42 DIABETIC POLYNEUROPATHY ASSOCIATED WITH TYPE 2 DIABETES MELLITUS (HCC): ICD-10-CM

## 2023-01-16 RX ORDER — INSULIN GLARGINE 100 [IU]/ML
INJECTION, SOLUTION SUBCUTANEOUS
Qty: 3 ML | OUTPATIENT
Start: 2023-01-16

## 2024-01-02 DIAGNOSIS — I10 ESSENTIAL HYPERTENSION: ICD-10-CM

## 2024-01-05 RX ORDER — FOSINOPRIL SODIUM 40 MG/1
40 TABLET ORAL DAILY
Qty: 30 TABLET | Refills: 2 | OUTPATIENT
Start: 2024-01-05

## 2024-01-26 DIAGNOSIS — I10 ESSENTIAL HYPERTENSION: ICD-10-CM

## 2024-01-26 RX ORDER — FOSINOPRIL SODIUM 40 MG/1
40 TABLET ORAL DAILY
Qty: 30 TABLET | Refills: 2 | OUTPATIENT
Start: 2024-01-26

## 2024-12-22 ENCOUNTER — HOSPITAL ENCOUNTER (INPATIENT)
Age: 79
LOS: 2 days | Discharge: HOME | DRG: 669 | End: 2024-12-25
Attending: EMERGENCY MEDICINE | Admitting: INTERNAL MEDICINE
Payer: MEDICARE

## 2024-12-22 ENCOUNTER — APPOINTMENT (OUTPATIENT)
Dept: CT IMAGING | Age: 79
DRG: 669 | End: 2024-12-22
Payer: MEDICARE

## 2024-12-22 DIAGNOSIS — N13.30 HYDROURETERONEPHROSIS: ICD-10-CM

## 2024-12-22 DIAGNOSIS — K57.90 DIVERTICULOSIS: ICD-10-CM

## 2024-12-22 DIAGNOSIS — N17.9 AKI (ACUTE KIDNEY INJURY) (HCC): Primary | ICD-10-CM

## 2024-12-22 DIAGNOSIS — N32.89 MASS OF BLADDER: ICD-10-CM

## 2024-12-22 DIAGNOSIS — D64.9 ANEMIA, UNSPECIFIED TYPE: ICD-10-CM

## 2024-12-22 PROBLEM — E78.5 DYSLIPIDEMIA: Status: ACTIVE | Noted: 2021-02-22

## 2024-12-22 PROBLEM — M54.16 LUMBAR RADICULOPATHY: Status: ACTIVE | Noted: 2021-02-22

## 2024-12-22 PROBLEM — R31.9 HEMATURIA SYNDROME: Status: ACTIVE | Noted: 2024-12-06

## 2024-12-22 PROBLEM — E78.00 HYPERCHOLESTEROLEMIA: Status: ACTIVE | Noted: 2020-02-05

## 2024-12-22 PROBLEM — N39.0 URINARY TRACT INFECTION, SITE NOT SPECIFIED: Status: ACTIVE | Noted: 2024-12-06

## 2024-12-22 PROBLEM — U07.1 DISEASE DUE TO SEVERE ACUTE RESPIRATORY SYNDROME CORONAVIRUS 2 (SARS-COV-2): Status: ACTIVE | Noted: 2022-06-21

## 2024-12-22 PROBLEM — E11.9 DIABETES MELLITUS (HCC): Status: ACTIVE | Noted: 2020-02-05

## 2024-12-22 PROBLEM — N94.6 DYSMENORRHEA, UNSPECIFIED: Status: ACTIVE | Noted: 2020-02-05

## 2024-12-22 PROBLEM — E11.42 TYPE 2 DIABETES MELLITUS WITH DIABETIC POLYNEUROPATHY, WITHOUT LONG-TERM CURRENT USE OF INSULIN (HCC): Status: ACTIVE | Noted: 2021-02-22

## 2024-12-22 PROBLEM — J20.9 ACUTE BRONCHITIS: Status: ACTIVE | Noted: 2020-02-05

## 2024-12-22 PROBLEM — C51.9 VULVAR CANCER (HCC): Status: ACTIVE | Noted: 2021-02-22

## 2024-12-22 LAB
ALBUMIN SERPL-MCNC: 3.8 G/DL (ref 3.2–4.6)
ALBUMIN/GLOB SERPL: 1.2 (ref 1–1.9)
ALP SERPL-CCNC: 92 U/L (ref 35–104)
ALT SERPL-CCNC: 28 U/L (ref 8–45)
ANION GAP SERPL CALC-SCNC: 15 MMOL/L (ref 7–16)
APPEARANCE UR: ABNORMAL
AST SERPL-CCNC: 28 U/L (ref 15–37)
BACTERIA URNS QL MICRO: 0 /HPF
BASOPHILS # BLD: 0 K/UL (ref 0–0.2)
BASOPHILS NFR BLD: 0 % (ref 0–2)
BILIRUB SERPL-MCNC: <0.2 MG/DL (ref 0–1.2)
BILIRUB UR QL: NEGATIVE
BUN SERPL-MCNC: 24 MG/DL (ref 8–23)
CALCIUM SERPL-MCNC: 9.6 MG/DL (ref 8.8–10.2)
CASTS URNS QL MICRO: 0 /LPF
CHLORIDE SERPL-SCNC: 96 MMOL/L (ref 98–107)
CO2 SERPL-SCNC: 26 MMOL/L (ref 20–29)
COLOR UR: ABNORMAL
CREAT SERPL-MCNC: 1.35 MG/DL (ref 0.6–1.1)
CRYSTALS URNS QL MICRO: 0 /LPF
DIFFERENTIAL METHOD BLD: ABNORMAL
EOSINOPHIL # BLD: 0.1 K/UL (ref 0–0.8)
EOSINOPHIL NFR BLD: 1 % (ref 0.5–7.8)
EPI CELLS #/AREA URNS HPF: NORMAL /HPF
ERYTHROCYTE [DISTWIDTH] IN BLOOD BY AUTOMATED COUNT: 17.2 % (ref 11.9–14.6)
GLOBULIN SER CALC-MCNC: 3.3 G/DL (ref 2.3–3.5)
GLUCOSE SERPL-MCNC: 274 MG/DL (ref 70–99)
GLUCOSE UR STRIP.AUTO-MCNC: 500 MG/DL
HCT VFR BLD AUTO: 33.5 % (ref 35.8–46.3)
HGB BLD-MCNC: 9.9 G/DL (ref 11.7–15.4)
HGB UR QL STRIP: ABNORMAL
IMM GRANULOCYTES # BLD AUTO: 0.1 K/UL (ref 0–0.5)
IMM GRANULOCYTES NFR BLD AUTO: 1 % (ref 0–5)
KETONES UR QL STRIP.AUTO: NEGATIVE MG/DL
LEUKOCYTE ESTERASE UR QL STRIP.AUTO: NEGATIVE
LYMPHOCYTES # BLD: 0.8 K/UL (ref 0.5–4.6)
LYMPHOCYTES NFR BLD: 8 % (ref 13–44)
MCH RBC QN AUTO: 26.8 PG (ref 26.1–32.9)
MCHC RBC AUTO-ENTMCNC: 29.6 G/DL (ref 31.4–35)
MCV RBC AUTO: 90.5 FL (ref 82–102)
MONOCYTES # BLD: 0.8 K/UL (ref 0.1–1.3)
MONOCYTES NFR BLD: 7 % (ref 4–12)
MUCOUS THREADS URNS QL MICRO: 0 /LPF
NEUTS SEG # BLD: 9.3 K/UL (ref 1.7–8.2)
NEUTS SEG NFR BLD: 83 % (ref 43–78)
NITRITE UR QL STRIP.AUTO: POSITIVE
NRBC # BLD: 0.02 K/UL (ref 0–0.2)
OTHER OBSERVATIONS: NORMAL
PH UR STRIP: 7.5 (ref 5–9)
PLATELET # BLD AUTO: 424 K/UL (ref 150–450)
PMV BLD AUTO: 10.4 FL (ref 9.4–12.3)
POTASSIUM SERPL-SCNC: 4 MMOL/L (ref 3.5–5.1)
PROCALCITONIN SERPL-MCNC: 0.09 NG/ML (ref 0–0.1)
PROT SERPL-MCNC: 7.1 G/DL (ref 6.3–8.2)
PROT UR STRIP-MCNC: >300 MG/DL
RBC # BLD AUTO: 3.7 M/UL (ref 4.05–5.2)
RBC #/AREA URNS HPF: >100 /HPF
SODIUM SERPL-SCNC: 137 MMOL/L (ref 136–145)
SP GR UR REFRACTOMETRY: 1.02 (ref 1–1.02)
UROBILINOGEN UR QL STRIP.AUTO: 0.2 EU/DL (ref 0.2–1)
WBC # BLD AUTO: 11.2 K/UL (ref 4.3–11.1)
WBC URNS QL MICRO: NORMAL /HPF

## 2024-12-22 PROCEDURE — 99285 EMERGENCY DEPT VISIT HI MDM: CPT

## 2024-12-22 PROCEDURE — 81001 URINALYSIS AUTO W/SCOPE: CPT

## 2024-12-22 PROCEDURE — 6360000004 HC RX CONTRAST MEDICATION

## 2024-12-22 PROCEDURE — 84145 PROCALCITONIN (PCT): CPT

## 2024-12-22 PROCEDURE — 85025 COMPLETE CBC W/AUTO DIFF WBC: CPT

## 2024-12-22 PROCEDURE — 96361 HYDRATE IV INFUSION ADD-ON: CPT

## 2024-12-22 PROCEDURE — 74177 CT ABD & PELVIS W/CONTRAST: CPT

## 2024-12-22 PROCEDURE — 2580000003 HC RX 258

## 2024-12-22 PROCEDURE — 80053 COMPREHEN METABOLIC PANEL: CPT

## 2024-12-22 PROCEDURE — 87086 URINE CULTURE/COLONY COUNT: CPT

## 2024-12-22 RX ORDER — IOPAMIDOL 755 MG/ML
100 INJECTION, SOLUTION INTRAVASCULAR
Status: COMPLETED | OUTPATIENT
Start: 2024-12-22 | End: 2024-12-22

## 2024-12-22 RX ORDER — 0.9 % SODIUM CHLORIDE 0.9 %
1000 INTRAVENOUS SOLUTION INTRAVENOUS ONCE
Status: COMPLETED | OUTPATIENT
Start: 2024-12-22 | End: 2024-12-23

## 2024-12-22 RX ADMIN — IOPAMIDOL 100 ML: 755 INJECTION, SOLUTION INTRAVENOUS at 21:53

## 2024-12-22 RX ADMIN — SODIUM CHLORIDE 1000 ML: 9 INJECTION, SOLUTION INTRAVENOUS at 23:12

## 2024-12-22 ASSESSMENT — PAIN - FUNCTIONAL ASSESSMENT: PAIN_FUNCTIONAL_ASSESSMENT: 0-10

## 2024-12-22 ASSESSMENT — PAIN SCALES - GENERAL: PAINLEVEL_OUTOF10: 10

## 2024-12-22 ASSESSMENT — PAIN DESCRIPTION - LOCATION: LOCATION: ABDOMEN;RECTUM

## 2024-12-23 ENCOUNTER — ANESTHESIA (OUTPATIENT)
Dept: SURGERY | Age: 79
DRG: 669 | End: 2024-12-23
Payer: MEDICARE

## 2024-12-23 ENCOUNTER — APPOINTMENT (OUTPATIENT)
Dept: CT IMAGING | Age: 79
DRG: 669 | End: 2024-12-23
Payer: MEDICARE

## 2024-12-23 ENCOUNTER — ANESTHESIA EVENT (OUTPATIENT)
Dept: SURGERY | Age: 79
DRG: 669 | End: 2024-12-23
Payer: MEDICARE

## 2024-12-23 PROBLEM — N13.9 OBSTRUCTIVE UROPATHY: Status: ACTIVE | Noted: 2024-12-23

## 2024-12-23 LAB
GLUCOSE BLD STRIP.AUTO-MCNC: 196 MG/DL (ref 65–100)
GLUCOSE BLD STRIP.AUTO-MCNC: 212 MG/DL (ref 65–100)
GLUCOSE BLD STRIP.AUTO-MCNC: 286 MG/DL (ref 65–100)
GLUCOSE BLD STRIP.AUTO-MCNC: 325 MG/DL (ref 65–100)
GLUCOSE BLD STRIP.AUTO-MCNC: 338 MG/DL (ref 65–100)
GLUCOSE BLD STRIP.AUTO-MCNC: 406 MG/DL (ref 65–100)
GLUCOSE BLD STRIP.AUTO-MCNC: 496 MG/DL (ref 65–100)
SERVICE CMNT-IMP: ABNORMAL

## 2024-12-23 PROCEDURE — 6360000002 HC RX W HCPCS: Performed by: INTERNAL MEDICINE

## 2024-12-23 PROCEDURE — 2709999900 HC NON-CHARGEABLE SUPPLY: Performed by: UROLOGY

## 2024-12-23 PROCEDURE — 2500000003 HC RX 250 WO HCPCS

## 2024-12-23 PROCEDURE — 94760 N-INVAS EAR/PLS OXIMETRY 1: CPT

## 2024-12-23 PROCEDURE — 2500000003 HC RX 250 WO HCPCS: Performed by: NURSE ANESTHETIST, CERTIFIED REGISTERED

## 2024-12-23 PROCEDURE — 6360000002 HC RX W HCPCS: Performed by: NURSE ANESTHETIST, CERTIFIED REGISTERED

## 2024-12-23 PROCEDURE — 6370000000 HC RX 637 (ALT 250 FOR IP): Performed by: INTERNAL MEDICINE

## 2024-12-23 PROCEDURE — 2720000010 HC SURG SUPPLY STERILE: Performed by: UROLOGY

## 2024-12-23 PROCEDURE — 1100000000 HC RM PRIVATE

## 2024-12-23 PROCEDURE — 2500000003 HC RX 250 WO HCPCS: Performed by: INTERNAL MEDICINE

## 2024-12-23 PROCEDURE — 96374 THER/PROPH/DIAG INJ IV PUSH: CPT

## 2024-12-23 PROCEDURE — 99222 1ST HOSP IP/OBS MODERATE 55: CPT | Performed by: NURSE PRACTITIONER

## 2024-12-23 PROCEDURE — 7100000001 HC PACU RECOVERY - ADDTL 15 MIN: Performed by: UROLOGY

## 2024-12-23 PROCEDURE — 3700000001 HC ADD 15 MINUTES (ANESTHESIA): Performed by: UROLOGY

## 2024-12-23 PROCEDURE — 51798 US URINE CAPACITY MEASURE: CPT

## 2024-12-23 PROCEDURE — 0TBB8ZZ EXCISION OF BLADDER, VIA NATURAL OR ARTIFICIAL OPENING ENDOSCOPIC: ICD-10-PCS | Performed by: UROLOGY

## 2024-12-23 PROCEDURE — 0TCB8ZZ EXTIRPATION OF MATTER FROM BLADDER, VIA NATURAL OR ARTIFICIAL OPENING ENDOSCOPIC: ICD-10-PCS | Performed by: UROLOGY

## 2024-12-23 PROCEDURE — 94640 AIRWAY INHALATION TREATMENT: CPT

## 2024-12-23 PROCEDURE — 6370000000 HC RX 637 (ALT 250 FOR IP): Performed by: NURSE ANESTHETIST, CERTIFIED REGISTERED

## 2024-12-23 PROCEDURE — 7100000000 HC PACU RECOVERY - FIRST 15 MIN: Performed by: UROLOGY

## 2024-12-23 PROCEDURE — 3600000014 HC SURGERY LEVEL 4 ADDTL 15MIN: Performed by: UROLOGY

## 2024-12-23 PROCEDURE — 3700000000 HC ANESTHESIA ATTENDED CARE: Performed by: UROLOGY

## 2024-12-23 PROCEDURE — 82962 GLUCOSE BLOOD TEST: CPT

## 2024-12-23 PROCEDURE — 1100000003 HC PRIVATE W/ TELEMETRY

## 2024-12-23 PROCEDURE — 51702 INSERT TEMP BLADDER CATH: CPT

## 2024-12-23 PROCEDURE — 2580000003 HC RX 258: Performed by: NURSE ANESTHETIST, CERTIFIED REGISTERED

## 2024-12-23 PROCEDURE — 6360000002 HC RX W HCPCS

## 2024-12-23 PROCEDURE — 3600000004 HC SURGERY LEVEL 4 BASE: Performed by: UROLOGY

## 2024-12-23 RX ORDER — ONDANSETRON 2 MG/ML
4 INJECTION INTRAMUSCULAR; INTRAVENOUS EVERY 6 HOURS PRN
Status: DISCONTINUED | OUTPATIENT
Start: 2024-12-23 | End: 2024-12-25 | Stop reason: HOSPADM

## 2024-12-23 RX ORDER — MAGNESIUM HYDROXIDE/ALUMINUM HYDROXICE/SIMETHICONE 120; 1200; 1200 MG/30ML; MG/30ML; MG/30ML
30 SUSPENSION ORAL EVERY 6 HOURS PRN
Status: DISCONTINUED | OUTPATIENT
Start: 2024-12-23 | End: 2024-12-25 | Stop reason: HOSPADM

## 2024-12-23 RX ORDER — HYDROMORPHONE HYDROCHLORIDE 1 MG/ML
1 INJECTION, SOLUTION INTRAMUSCULAR; INTRAVENOUS; SUBCUTANEOUS EVERY 4 HOURS PRN
Status: DISCONTINUED | OUTPATIENT
Start: 2024-12-23 | End: 2024-12-25 | Stop reason: HOSPADM

## 2024-12-23 RX ORDER — ALBUTEROL SULFATE 0.83 MG/ML
2.5 SOLUTION RESPIRATORY (INHALATION)
Status: DISCONTINUED | OUTPATIENT
Start: 2024-12-23 | End: 2024-12-23

## 2024-12-23 RX ORDER — INSULIN LISPRO 100 [IU]/ML
0-10 INJECTION, SOLUTION INTRAVENOUS; SUBCUTANEOUS
Status: DISCONTINUED | OUTPATIENT
Start: 2024-12-23 | End: 2024-12-25 | Stop reason: HOSPADM

## 2024-12-23 RX ORDER — ALBUTEROL SULFATE 0.83 MG/ML
2.5 SOLUTION RESPIRATORY (INHALATION) EVERY 4 HOURS PRN
Status: DISCONTINUED | OUTPATIENT
Start: 2024-12-23 | End: 2024-12-25 | Stop reason: HOSPADM

## 2024-12-23 RX ORDER — AMLODIPINE BESYLATE 5 MG/1
5 TABLET ORAL DAILY
Status: DISCONTINUED | OUTPATIENT
Start: 2024-12-23 | End: 2024-12-25 | Stop reason: HOSPADM

## 2024-12-23 RX ORDER — FAMOTIDINE 20 MG/1
10 TABLET, FILM COATED ORAL DAILY PRN
Status: DISCONTINUED | OUTPATIENT
Start: 2024-12-23 | End: 2024-12-23 | Stop reason: SDUPTHER

## 2024-12-23 RX ORDER — TRIAMTERENE AND HYDROCHLOROTHIAZIDE 37.5; 25 MG/1; MG/1
0.5 TABLET ORAL DAILY
Status: DISCONTINUED | OUTPATIENT
Start: 2024-12-23 | End: 2024-12-25 | Stop reason: HOSPADM

## 2024-12-23 RX ORDER — PROPOFOL 10 MG/ML
INJECTION, EMULSION INTRAVENOUS
Status: DISCONTINUED | OUTPATIENT
Start: 2024-12-23 | End: 2024-12-23 | Stop reason: SDUPTHER

## 2024-12-23 RX ORDER — FAMOTIDINE 20 MG/1
20 TABLET, FILM COATED ORAL DAILY PRN
Status: DISCONTINUED | OUTPATIENT
Start: 2024-12-23 | End: 2024-12-25 | Stop reason: HOSPADM

## 2024-12-23 RX ORDER — SODIUM CHLORIDE 9 MG/ML
INJECTION, SOLUTION INTRAVENOUS PRN
Status: DISCONTINUED | OUTPATIENT
Start: 2024-12-23 | End: 2024-12-25 | Stop reason: HOSPADM

## 2024-12-23 RX ORDER — ALBUTEROL SULFATE 90 UG/1
INHALANT RESPIRATORY (INHALATION)
Status: DISCONTINUED | OUTPATIENT
Start: 2024-12-23 | End: 2024-12-23 | Stop reason: SDUPTHER

## 2024-12-23 RX ORDER — ACETAMINOPHEN 325 MG/1
650 TABLET ORAL EVERY 6 HOURS PRN
Status: DISCONTINUED | OUTPATIENT
Start: 2024-12-23 | End: 2024-12-25 | Stop reason: HOSPADM

## 2024-12-23 RX ORDER — SODIUM CHLORIDE 0.9 % (FLUSH) 0.9 %
5-40 SYRINGE (ML) INJECTION PRN
Status: DISCONTINUED | OUTPATIENT
Start: 2024-12-23 | End: 2024-12-25 | Stop reason: HOSPADM

## 2024-12-23 RX ORDER — LISINOPRIL 20 MG/1
40 TABLET ORAL DAILY
Status: DISCONTINUED | OUTPATIENT
Start: 2024-12-23 | End: 2024-12-25 | Stop reason: HOSPADM

## 2024-12-23 RX ORDER — ROSUVASTATIN CALCIUM 10 MG/1
10 TABLET, COATED ORAL DAILY
Status: DISCONTINUED | OUTPATIENT
Start: 2024-12-23 | End: 2024-12-25 | Stop reason: HOSPADM

## 2024-12-23 RX ORDER — IBUPROFEN 600 MG/1
1 TABLET ORAL PRN
Status: DISCONTINUED | OUTPATIENT
Start: 2024-12-23 | End: 2024-12-23 | Stop reason: SDUPTHER

## 2024-12-23 RX ORDER — ASPIRIN 81 MG/1
81 TABLET ORAL DAILY
Status: DISCONTINUED | OUTPATIENT
Start: 2024-12-23 | End: 2024-12-25 | Stop reason: HOSPADM

## 2024-12-23 RX ORDER — FERROUS SULFATE 325(65) MG
650 TABLET ORAL DAILY
Status: DISCONTINUED | OUTPATIENT
Start: 2024-12-23 | End: 2024-12-25 | Stop reason: HOSPADM

## 2024-12-23 RX ORDER — LANOLIN ALCOHOL/MO/W.PET/CERES
2000 CREAM (GRAM) TOPICAL DAILY
Status: DISCONTINUED | OUTPATIENT
Start: 2024-12-23 | End: 2024-12-25 | Stop reason: HOSPADM

## 2024-12-23 RX ORDER — DEXTROSE MONOHYDRATE 100 MG/ML
INJECTION, SOLUTION INTRAVENOUS CONTINUOUS PRN
Status: DISCONTINUED | OUTPATIENT
Start: 2024-12-23 | End: 2024-12-25 | Stop reason: HOSPADM

## 2024-12-23 RX ORDER — INSULIN GLARGINE 100 [IU]/ML
10 INJECTION, SOLUTION SUBCUTANEOUS NIGHTLY
Status: DISCONTINUED | OUTPATIENT
Start: 2024-12-23 | End: 2024-12-24

## 2024-12-23 RX ORDER — ONDANSETRON 4 MG/1
4 TABLET, ORALLY DISINTEGRATING ORAL EVERY 8 HOURS PRN
Status: DISCONTINUED | OUTPATIENT
Start: 2024-12-23 | End: 2024-12-25 | Stop reason: HOSPADM

## 2024-12-23 RX ORDER — ACETAMINOPHEN 650 MG/1
650 SUPPOSITORY RECTAL EVERY 6 HOURS PRN
Status: DISCONTINUED | OUTPATIENT
Start: 2024-12-23 | End: 2024-12-25 | Stop reason: HOSPADM

## 2024-12-23 RX ORDER — ASCORBIC ACID 500 MG
250 TABLET ORAL DAILY
Status: DISCONTINUED | OUTPATIENT
Start: 2024-12-23 | End: 2024-12-25 | Stop reason: HOSPADM

## 2024-12-23 RX ORDER — LABETALOL HYDROCHLORIDE 5 MG/ML
INJECTION, SOLUTION INTRAVENOUS
Status: DISCONTINUED | OUTPATIENT
Start: 2024-12-23 | End: 2024-12-23 | Stop reason: SDUPTHER

## 2024-12-23 RX ORDER — ONDANSETRON 2 MG/ML
INJECTION INTRAMUSCULAR; INTRAVENOUS
Status: DISCONTINUED | OUTPATIENT
Start: 2024-12-23 | End: 2024-12-23 | Stop reason: SDUPTHER

## 2024-12-23 RX ORDER — GLYCOPYRROLATE 0.2 MG/ML
INJECTION INTRAMUSCULAR; INTRAVENOUS
Status: DISCONTINUED | OUTPATIENT
Start: 2024-12-23 | End: 2024-12-23 | Stop reason: SDUPTHER

## 2024-12-23 RX ORDER — NEOSTIGMINE METHYLSULFATE 1 MG/ML
INJECTION INTRAVENOUS
Status: DISCONTINUED | OUTPATIENT
Start: 2024-12-23 | End: 2024-12-23 | Stop reason: SDUPTHER

## 2024-12-23 RX ORDER — SODIUM CHLORIDE 0.9 % (FLUSH) 0.9 %
5-40 SYRINGE (ML) INJECTION EVERY 12 HOURS SCHEDULED
Status: DISCONTINUED | OUTPATIENT
Start: 2024-12-23 | End: 2024-12-25 | Stop reason: HOSPADM

## 2024-12-23 RX ORDER — DEXAMETHASONE SODIUM PHOSPHATE 4 MG/ML
INJECTION, SOLUTION INTRA-ARTICULAR; INTRALESIONAL; INTRAMUSCULAR; INTRAVENOUS; SOFT TISSUE
Status: DISCONTINUED | OUTPATIENT
Start: 2024-12-23 | End: 2024-12-23 | Stop reason: SDUPTHER

## 2024-12-23 RX ORDER — IBUPROFEN 600 MG/1
1 TABLET ORAL PRN
Status: DISCONTINUED | OUTPATIENT
Start: 2024-12-23 | End: 2024-12-25 | Stop reason: HOSPADM

## 2024-12-23 RX ORDER — LABETALOL HYDROCHLORIDE 5 MG/ML
10 INJECTION, SOLUTION INTRAVENOUS ONCE
Status: COMPLETED | OUTPATIENT
Start: 2024-12-23 | End: 2024-12-23

## 2024-12-23 RX ORDER — POLYETHYLENE GLYCOL 3350 17 G/17G
17 POWDER, FOR SOLUTION ORAL DAILY PRN
Status: DISCONTINUED | OUTPATIENT
Start: 2024-12-23 | End: 2024-12-25 | Stop reason: HOSPADM

## 2024-12-23 RX ORDER — LIDOCAINE HYDROCHLORIDE 20 MG/ML
INJECTION, SOLUTION EPIDURAL; INFILTRATION; INTRACAUDAL; PERINEURAL
Status: DISCONTINUED | OUTPATIENT
Start: 2024-12-23 | End: 2024-12-23 | Stop reason: SDUPTHER

## 2024-12-23 RX ORDER — EPHEDRINE SULFATE 5 MG/ML
INJECTION INTRAVENOUS
Status: DISCONTINUED | OUTPATIENT
Start: 2024-12-23 | End: 2024-12-23 | Stop reason: SDUPTHER

## 2024-12-23 RX ORDER — ENOXAPARIN SODIUM 100 MG/ML
30 INJECTION SUBCUTANEOUS DAILY
Status: DISCONTINUED | OUTPATIENT
Start: 2024-12-23 | End: 2024-12-23

## 2024-12-23 RX ORDER — BISACODYL 10 MG
10 SUPPOSITORY, RECTAL RECTAL DAILY PRN
Status: DISCONTINUED | OUTPATIENT
Start: 2024-12-23 | End: 2024-12-25 | Stop reason: HOSPADM

## 2024-12-23 RX ORDER — ROCURONIUM BROMIDE 10 MG/ML
INJECTION, SOLUTION INTRAVENOUS
Status: DISCONTINUED | OUTPATIENT
Start: 2024-12-23 | End: 2024-12-23 | Stop reason: SDUPTHER

## 2024-12-23 RX ADMIN — ALBUTEROL SULFATE 2.5 MG: 2.5 SOLUTION RESPIRATORY (INHALATION) at 08:54

## 2024-12-23 RX ADMIN — AMLODIPINE BESYLATE 5 MG: 5 TABLET ORAL at 09:30

## 2024-12-23 RX ADMIN — HYDROMORPHONE HYDROCHLORIDE 1 MG: 1 INJECTION, SOLUTION INTRAMUSCULAR; INTRAVENOUS; SUBCUTANEOUS at 01:38

## 2024-12-23 RX ADMIN — LABETALOL HYDROCHLORIDE 10 MG: 5 INJECTION INTRAVENOUS at 02:44

## 2024-12-23 RX ADMIN — WATER 1000 MG: 1 INJECTION INTRAMUSCULAR; INTRAVENOUS; SUBCUTANEOUS at 00:26

## 2024-12-23 RX ADMIN — PHENYLEPHRINE HYDROCHLORIDE 100 MCG: 10 INJECTION INTRAVENOUS at 12:23

## 2024-12-23 RX ADMIN — NEOSTIGMINE METHYLSULFATE 4 MG: 1 INJECTION INTRAVENOUS at 12:53

## 2024-12-23 RX ADMIN — ALBUTEROL SULFATE 2 PUFF: 90 AEROSOL, METERED RESPIRATORY (INHALATION) at 12:13

## 2024-12-23 RX ADMIN — Medication 30 ML: at 12:13

## 2024-12-23 RX ADMIN — INSULIN LISPRO 10 UNITS: 100 INJECTION, SOLUTION INTRAVENOUS; SUBCUTANEOUS at 21:01

## 2024-12-23 RX ADMIN — ASPIRIN 81 MG: 81 TABLET, COATED ORAL at 09:38

## 2024-12-23 RX ADMIN — PHENYLEPHRINE HYDROCHLORIDE 100 MCG: 10 INJECTION INTRAVENOUS at 12:15

## 2024-12-23 RX ADMIN — PROPOFOL 140 MG: 10 INJECTION, EMULSION INTRAVENOUS at 12:05

## 2024-12-23 RX ADMIN — LIDOCAINE HYDROCHLORIDE 100 MG: 20 INJECTION, SOLUTION EPIDURAL; INFILTRATION; INTRACAUDAL; PERINEURAL at 12:05

## 2024-12-23 RX ADMIN — LABETALOL HYDROCHLORIDE 5 MG: 5 INJECTION INTRAVENOUS at 13:10

## 2024-12-23 RX ADMIN — INSULIN LISPRO 3 UNITS: 100 INJECTION, SOLUTION INTRAVENOUS; SUBCUTANEOUS at 16:56

## 2024-12-23 RX ADMIN — SUGAMMADEX 200 MG: 100 INJECTION, SOLUTION INTRAVENOUS at 13:12

## 2024-12-23 RX ADMIN — INSULIN LISPRO 8 UNITS: 100 INJECTION, SOLUTION INTRAVENOUS; SUBCUTANEOUS at 09:30

## 2024-12-23 RX ADMIN — ONDANSETRON 4 MG: 2 INJECTION INTRAMUSCULAR; INTRAVENOUS at 12:13

## 2024-12-23 RX ADMIN — DEXAMETHASONE SODIUM PHOSPHATE 4 MG: 4 INJECTION INTRA-ARTICULAR; INTRALESIONAL; INTRAMUSCULAR; INTRAVENOUS; SOFT TISSUE at 12:13

## 2024-12-23 RX ADMIN — ONDANSETRON 4 MG: 2 INJECTION, SOLUTION INTRAMUSCULAR; INTRAVENOUS at 02:23

## 2024-12-23 RX ADMIN — GLYCOPYRROLATE 0.8 MG: 0.2 INJECTION INTRAMUSCULAR; INTRAVENOUS at 12:53

## 2024-12-23 RX ADMIN — INSULIN GLARGINE 10 UNITS: 100 INJECTION, SOLUTION SUBCUTANEOUS at 21:00

## 2024-12-23 RX ADMIN — ROCURONIUM BROMIDE 40 MG: 10 INJECTION, SOLUTION INTRAVENOUS at 12:06

## 2024-12-23 RX ADMIN — EPHEDRINE SULFATE 5 MG: 5 INJECTION INTRAVENOUS at 12:23

## 2024-12-23 RX ADMIN — SODIUM CHLORIDE, PRESERVATIVE FREE 10 ML: 5 INJECTION INTRAVENOUS at 22:01

## 2024-12-23 RX ADMIN — INSULIN LISPRO 10 UNITS: 100 INJECTION, SOLUTION INTRAVENOUS; SUBCUTANEOUS at 23:09

## 2024-12-23 RX ADMIN — PHENOL 1 SPRAY: 1.5 LIQUID ORAL at 21:37

## 2024-12-23 RX ADMIN — WATER 1000 MG: 1 INJECTION INTRAMUSCULAR; INTRAVENOUS; SUBCUTANEOUS at 23:04

## 2024-12-23 ASSESSMENT — PAIN SCALES - GENERAL
PAINLEVEL_OUTOF10: 9
PAINLEVEL_OUTOF10: 2
PAINLEVEL_OUTOF10: 2
PAINLEVEL_OUTOF10: 0

## 2024-12-23 ASSESSMENT — PAIN DESCRIPTION - DESCRIPTORS: DESCRIPTORS: ACHING

## 2024-12-23 ASSESSMENT — LIFESTYLE VARIABLES: SMOKING_STATUS: 1

## 2024-12-23 ASSESSMENT — PAIN DESCRIPTION - ORIENTATION: ORIENTATION: LOWER

## 2024-12-23 ASSESSMENT — PAIN DESCRIPTION - LOCATION: LOCATION: ABDOMEN

## 2024-12-23 ASSESSMENT — PAIN - FUNCTIONAL ASSESSMENT: PAIN_FUNCTIONAL_ASSESSMENT: NONE - DENIES PAIN

## 2024-12-23 NOTE — ANESTHESIA POSTPROCEDURE EVALUATION
Department of Anesthesiology  Postprocedure Note    Patient: Maria Alejandra Castillo  MRN: [id number]  YOB: 1945  Date of evaluation: 12/23/2024    Procedure Summary       Date: 12/23/24 Room / Location: [facility] MAIN OR  [facility] / [facility] MAIN OR    Anesthesia Start: 1157 Anesthesia Stop: 1312    Procedure: CYSTOSCOPY, CLOT EVACUATION,  TRANSURETHRAL RESECTION BLADDER (Bladder) Diagnosis:       Hematuria, unspecified type      (Hematuria, unspecified type [R31.9])    Providers: Riley Lacey MD Responsible Provider: Britton Shaw MD    Anesthesia Type: general ASA Status: 3            Anesthesia Type: No value filed.    Juan Antonio Phase I: Juan Antonio Score: 10    Juan Antonio Phase II:      Anesthesia Post Evaluation    Patient location during evaluation: PACU  Patient participation: complete - patient participated  Level of consciousness: awake  Airway patency: patent  Nausea & Vomiting: no nausea and no vomiting  Cardiovascular status: blood pressure returned to baseline  Respiratory status: acceptable  Hydration status: euvolemic  Pain management: adequate    No notable events documented.

## 2024-12-23 NOTE — ED NOTES
TRANSFER - OUT REPORT:    Verbal report given to Karla GARDNER on Maria Alejandra Castillo  being transferred to [facility] for routine progression of patient care       Report consisted of patient's Situation, Background, Assessment and   Recommendations(SBAR).     Information from the following report(s) Nurse Handoff Report was reviewed with the receiving nurse.    Thuy Fall Assessment:    Presents to emergency department  because of falls (Syncope, seizure, or loss of consciousness): No  Age > 70: Yes  Altered Mental Status, Intoxication with alcohol or substance confusion (Disorientation, impaired judgment, poor safety awaremess, or inability to follow instructions): No  Impaired Mobility: Ambulates or transfers with assistive devices or assistance; Unable to ambulate or transer.: No  Nursing Judgement: No          Lines:   Peripheral IV 12/22/24 Left;Dorsal Forearm (Active)   Site Assessment Clean, dry & intact 12/22/24 2103   Line Status Brisk blood return 12/22/24 2103   Line Care Connections checked and tightened 12/22/24 2103   Phlebitis Assessment No symptoms 12/22/24 2103   Infiltration Assessment 0 12/22/24 2103   Dressing Status Clean, dry & intact 12/22/24 2103        Opportunity for questions and clarification was provided.      Patient transported with:  Monitor and Vidhi Martin RN  12/23/24 9120

## 2024-12-23 NOTE — PROGRESS NOTES
Nutrition Assessment  Assessment Type: Initial  Reason for visit:  Best Practice Alert: Malnutrition Screening Tool   Malnutrition Screening Tool Score: 5    Nutrition Intervention:   Food and/or Nutrient Delivery:   Meals and Snacks:  Diet: Continue current order  Medical Food Supplements:   Medical food supplement therapy:  Continue Glucerna Nutrition Shake (diabetic oral supplement) 220 calories, 10 grams protein per 8 ounce serving until po adequacy can be established     Malnutrition Assessment:  Malnutrition Status: At risk for malnutrition (reports wt loss and appetite less  than baseline)  Nutrition Focused Physical Exam: Unremarkable     Nutrition Assessment:  Food/Nutrition Related History:   Pt reports in the past she had a large appetite but it has declined.  She eats 3 meals daily to prevent her blood sugars from dropping. +dexcom, reports use of SSI at home.  Glucophage, lantus, SSI active on medications prior to admission.  Meal intake includes oatmeal in am, smart ones for noon meal and varied items at evening meal.  She generally uses items from [employer], take/bake items.  Family notes pt eats less than she has in the past but recent intake has remained consistent with usual patterns.          Weight History:   Pt reports # indicating likely within the last year after asking if I wanted to know weight before or after her son was born.  She notes that she feels her wt loss has stabilized.    Limited EMR wt hx available:   127# FM office 11/23/2022  IM office 135# 5/31/22, 142# 12/10/21.    Bed scale wt at RD encounter 132# consistent with stated wt on admission historical weights available.    Nutrition Background:       PMH remarkable for HLP, lumbar radiculopathy, DM, carotid endarterectomy, COPD, primary vulvar cancer, PVD, HTN, hysterectomy with bilateral salpingo-oophorectomy.    Admitted with obstructive uropathy, fe deficiency anemia, anxiety, DM polyneuropathy.    12/23: Cystoscopy,

## 2024-12-23 NOTE — OP NOTE
[address]  [address]                            OPERATIVE REPORT      PATIENT NAME: ERASTO HILLMAN             : 1945  MED REC NO: [id number]                       ROOM: [facility] NO: [id number]                       ADMIT DATE: 2024  PROVIDER: Riley Lacey MD    DATE OF SERVICE:  2024    PREOPERATIVE DIAGNOSES:  Bladder tumor and gross hematuria with clot retention.    POSTOPERATIVE DIAGNOSES:  Bladder tumor and gross hematuria with clot retention.    PROCEDURES PERFORMED:  Cystourethroscopy, evacuation of clot and large transurethral resection of bladder tumor.    SURGEON:  Riley Lacey MD    ASSISTANT:  None.    ANESTHESIA:  General.    ESTIMATED BLOOD LOSS:  Minimal.    SPECIMENS REMOVED:  Bladder tumor.    INTRAOPERATIVE FINDINGS:  She had a mounded large mass arising from the right denisa-trigone that was bleeding.  She had a large amount of clot in the bladder as well.     COMPLICATIONS:  None apparent.    IMPLANTS:  None.    INDICATIONS:  This is a 79-year-old female with 1 month of intermittent gross hematuria it got severe enough last night for her to present to emergency room.  CAT scan revealed a 5 cm x 3 cm tumor overlying the right ureteral orifice with proximal hydroureteronephrosis down to that level.  She also had a large amount of clot in her bladder.  After discussion of risks versus benefits, she decided to move forward with the above-named procedure.    DESCRIPTION OF PROCEDURE:  She was taken to the operating room, placed in supine position.  Anesthesia was induced via Anesthesiology Service.  She was repositioned in the lithotomy position and prepped and draped in sterile surgical fashion with the genitalia in a sterile field.  A 26-American resectoscope for the monopolar set was introduced atraumatically using visual obturator.  I then copiously irrigated large amounts of clots out of

## 2024-12-23 NOTE — ED TRIAGE NOTES
Pt via wheelchair to triage with family for reports of hematuria. Pt states she was seen at urgent care after Thanksgiving & prescribed macrobid which she finished. Pt feels generally unwell & states she has severe pain when using the restroom. Pt family states she had syncopal episode witnessed by niece a few weeks ago prior to urgent care visit. Pt denies any chest pain.

## 2024-12-23 NOTE — CARE COORDINATION
12/23/24 1220   Service Assessment   Patient Orientation Alert and Oriented   Cognition Alert   History Provided By Patient   Primary Caregiver Self   Support Systems Children;Family Members   Patient's Healthcare Decision Maker is: Legal Next of Kin   PCP Verified by CM Yes   Last Visit to PCP Within last 6 months   Prior Functional Level Independent in ADLs/IADLs   Current Functional Level Independent in ADLs/IADLs   Can patient return to prior living arrangement Yes   Ability to make needs known: Good   Family able to assist with home care needs: Yes   Would you like for me to discuss the discharge plan with any other family members/significant others, and if so, who? No   Financial Resources Medicare   Community Resources None   Social/Functional History   Lives With Alone   Type of Home House   Condition of Participation: Discharge Planning   The Plan for Transition of Care is related to the following treatment goals: return home   The Patient and/or Patient Representative was provided with a Choice of Provider? Patient   The Patient and/Or Patient Representative agree with the Discharge Plan? Yes   Freedom of Choice list was provided with basic dialogue that supports the patient's individualized plan of care/goals, treatment preferences, and shares the quality data associated with the providers?  Yes     Assessment completed with patient at bedside. Patient lives alone. She has a cane and rolling walker at home. She reports she is independent with ADLs at baseline. She has local children that assist as needed. Discharge plan is to return home. No anticipated discharge needs.     Joshua HUDSON, ACM  [place]

## 2024-12-23 NOTE — CONSULTS
Notes  Details    Reading Physician Reading Date Result Priority   [place] Mendoza MD  [phone] 12/22/2024      Narrative & Impression     EXAMINATION: CT ABDOMEN PELVIS W IV CONTRAST     EXAM DATE: 12/22/2024 10:02 PM     INDICATION: Hematuria, lower abdominal pain     COMPARISON: January 23, 2017     TECHNIQUE: Axial CT images were obtained of the abdomen and pelvis with  intravenous contrast. Multiplanar reconstructions were performed.            ABDOMEN/PELVIS FINDINGS:     Lower Chest: Unremarkable.     Liver: Normal enhancement and contour.     Biliary/Gallbladder: Unremarkable.     Pancreas: Unremarkable.     Spleen: Unremarkable.     Adrenal Glands: Unremarkable.     Kidneys: Moderate right hydroureteronephrosis present with obstruction at the  level of the distal ureter due to a bladder mass.     Gastrointestinal/Peritoneum: No acute abnormality. Mild colonic diverticulosis  is present. The appendix is unremarkable. No free air or free fluid.     Vascular: Moderate scattered atherosclerotic calcifications are present.     Lymph Nodes: No enlarged lymph nodes by CT size criteria.     Pelvic Organs: Prior hysterectomy.     Bladder: There is a focal mass lesion along the right posterior lateral bladder  wall measuring 4.6 x 2.2 cm. An additional rounded central area of increased  attenuation is present, most likely representing a bladder hematoma     Bones: No acute osseous abnormality. Mild multilevel degenerative changes are  present in the visualized spine.     Soft tissues: Unremarkable.        IMPRESSION:     1.  Right posteriolateral bladder mass causing moderate right obstructive  uropathy.     2.  Hematoma present in the bladder.     3.  Mild colonic diverticulosis.     4.  Prior hysterectomy.            Plan:     NPO.  Will post to OR today TURBT clot evacuation with possible right stent placement with Dr. Lacey.      Signed By: Germania Kent, CHAO-ТАТЬЯНА     December 23, 2024

## 2024-12-23 NOTE — PROGRESS NOTES
TRANSFER - IN REPORT:    Verbal report received from KENDRA Shannon on Maria Alejandra Castillo  being received from PACU for routine progression of patient care      Report consisted of patient's Situation, Background, Assessment and   Recommendations(SBAR).     Information from the following report(s) Nurse Handoff Report was reviewed with the receiving nurse.    Opportunity for questions and clarification was provided.      Assessment completed upon patient's arrival to unit and care assumed.      Patient on 3L via NC (AtF6=737%)  Blood Sugar (eoi=389)  New 2-way Serna (pink c/ red flecks)

## 2024-12-23 NOTE — PROGRESS NOTES
Pt complaining of pain/pressure in bladder on arrival to floor.  Bladder scan showed 512 cc, Dr. Arriaza notified via secure message and mederos ordered.  Mederos placed with immediate return of bloody urine.  Some small clots noted in collection bag.  Pt expressed immediate relief.  Medicated x1 for nausea shortly after arrival to floor with good results.

## 2024-12-23 NOTE — ANESTHESIA PRE PROCEDURE
Department of Anesthesiology  Preprocedure Note       Name:  Maria Alejandra Castillo   Age:  79 y.o.  :  1945                                          MRN:  [id number]         Date:  2024      Surgeon: Surgeon(s):  Riley Lacey MD    Procedure: Procedure(s):  CYSTOSCOPY TRANSURETHRAL RESECTION BLADDER POSSIBLE RIGHT STENT INSERTION  CYSTOSCOPY RIGHT URETERAL STENT INSERTION    Medications prior to admission:   Prior to Admission medications    Medication Sig Start Date End Date Taking? Authorizing Provider   insulin glargine (LANTUS SOLOSTAR) 100 UNIT/ML injection pen Inject 16 Units into the skin nightly Increase by 1 unit nightly until fasting morning blood sugar reaches <150 and stay at that dose up to maximum daily dose 20 units. 11/3/22  Yes Saba Meza APRN - CNP   insulin aspart (NOVOLOG FLEXPEN) 100 UNIT/ML injection pen Correction Scale three times daily SQ before meals for fasting glucose:   Less than 150 =   0 units      150 -199 =   2 units  200 -249 = 4 units  250 -299 = 6 units 300 -349 =  8 units 350 and above = 10 units and Call MD Max daily dose 30 units. Initiate hypoglycemia protocol if blood sugar is <70 11/3/22  Yes Saba Meza APRN - CNP   Glucagon (GVOKE HYPOPEN 2-PACK) 1 MG/0.2ML SOAJ Inject 1 mg into the skin as needed (severe hypoglycemia) May repeat dose x 1 after 15 minutes. 11/3/22  Yes Saba Meza APRN - CNP   ferrous sulfate (IRON 325) 325 (65 Fe) MG tablet Take 2 tablets by mouth daily 20  Yes Solis Rasheed MD   aspirin 81 MG EC tablet Take 1 tablet by mouth daily   Yes Provider, MD Solis   cyanocobalamin 2000 MCG tablet Take 1 tablet by mouth daily   Yes Provider, MD Solis   ascorbic acid (VITAMIN C) 250 MG tablet Take 1 tablet by mouth daily   Yes Solis Rasheed MD   albuterol sulfate HFA (PROVENTIL;VENTOLIN;PROAIR) 108 (90 Base) MCG/ACT inhaler Inhale 1 puff into the lungs every 6 hours as needed for Wheezing or Shortness of

## 2024-12-23 NOTE — PROGRESS NOTES
TRANSFER - OUT REPORT:    Verbal report given to KENDRA Brito on Maria Alejandra Castillo  being transferred to Pre-Op/Surgery for ordered procedure       Report consisted of patient's Situation, Background, Assessment and   Recommendations(SBAR).     Information from the following report(s) Nurse Handoff Report was reviewed with the receiving nurse.           Lines:   Peripheral IV 12/22/24 Left;Dorsal Forearm (Active)   Site Assessment Clean, dry & intact 12/23/24 0740   Line Status Flushed;Capped;Normal saline locked 12/23/24 0740   Line Care Cap changed;Connections checked and tightened 12/23/24 0740   Phlebitis Assessment No symptoms 12/23/24 0740   Infiltration Assessment 0 12/23/24 0740   Alcohol Cap Used Yes 12/23/24 0740   Dressing Status Clean, dry & intact 12/23/24 0740   Dressing Type Transparent 12/23/24 0740        Opportunity for questions and clarification was provided.      Patient transported with:  O2 @ 3lpm

## 2024-12-23 NOTE — PROGRESS NOTES
Hospitalist Progress Note   Admit Date:  2024  8:33 PM   Name:  Maria Alejandra Castillo   Age:  79 y.o.  Sex:  female  :  1945   MRN:  [id number]   Room:  [facility]/    Presenting/Chief Complaint: Hematuria     Reason(s) for Admission: Hydroureteronephrosis [N13.30]  Diverticulosis [K57.90]  Obstructive uropathy [N13.9]  BRENDA (acute kidney injury) (HCC) [N17.9]  Mass of bladder [N32.89]  Anemia, unspecified type [D64.9]     Hospital Course:   Maria Alejandra Castillo is a 79 y.o. female with medical history of hyperlipidemia, lumbar radiculopathy, type 2 diabetes, carotid endarterectomy, COPD, primary vulvar cancer, peripheral vascular disease, hypertension, and hysterectomy with bilateral salpingo-oophorectomy who presented to  ED due to hematuria.  She reported that she saw urgent care after Thanksgiving and was given Macrobid but continues to have severe pain with urination and feeling unwell.  Her family also stated the emergency room that she had a syncopal episode witnessed by her niece a few weeks ago.    In the emergency room she was found to have josie hematuria and CT abdomen pelvis with IV contrast showed moderate right hydroureteronephrosis present with obstruction at the level of the distal ureter due to a bladder mass. There is no acute abnormality within the abdomen or pelvis. There is some mild colonic diverticulosis. Appendix is unremarkable. No free fluid or free air. There is a focal mass lesion along the right posterior lateral bladder wall measuring 4.6 x 2.2 cm. An additional rounded central area of increased attenuation is present likely representing a bladder hematoma.     ER did speak to on-call urologist who stated they would see her in consult and please admit.  Hospitalist team was consulted for admission    Subjective & 24hr Events:   Ms. Castillo is seen today after her procedure with urology.  She had a cystoscopy, clot evacuation, and transurethral resection of the bladder per

## 2024-12-23 NOTE — PLAN OF CARE
Problem: Chronic Conditions and Co-morbidities  Goal: Patient's chronic conditions and co-morbidity symptoms are monitored and maintained or improved  12/23/2024 1319 by Saba Armstrong RN  Outcome: Progressing  12/23/2024 1319 by Saba Armstrogn RN  Outcome: Progressing  12/23/2024 0506 by Yue Buenrostro RN  Outcome: Progressing     Problem: Discharge Planning  Goal: Discharge to home or other facility with appropriate resources  12/23/2024 1319 by Saba Armstrong RN  Outcome: Progressing  12/23/2024 1319 by Saba Armstrong RN  Outcome: Progressing  12/23/2024 0506 by Yue Buenrostro RN  Outcome: Progressing     Problem: Pain  Goal: Verbalizes/displays adequate comfort level or baseline comfort level  12/23/2024 1319 by Saba Armstrong RN  Outcome: Progressing  12/23/2024 1319 by Saba Armstrong RN  Outcome: Progressing  12/23/2024 0506 by Yue Buenrostro RN  Outcome: Progressing     Problem: Skin/Tissue Integrity  Goal: Absence of new skin breakdown  Description: 1.  Monitor for areas of redness and/or skin breakdown  2.  Assess vascular access sites hourly  3.  Every 4-6 hours minimum:  Change oxygen saturation probe site  4.  Every 4-6 hours:  If on nasal continuous positive airway pressure, respiratory therapy assess nares and determine need for appliance change or resting period.  12/23/2024 1319 by Saba Armstrong RN  Outcome: Progressing  12/23/2024 1319 by Saba Armstrong RN  Outcome: Progressing  12/23/2024 0506 by Yue Buenrostro RN  Outcome: Progressing     Problem: ABCDS Injury Assessment  Goal: Absence of physical injury  12/23/2024 1319 by Saba Armstrong RN  Outcome: Progressing  12/23/2024 1319 by Saba Armstrong RN  Outcome: Progressing  12/23/2024 0506 by Yue Buenrostro RN  Outcome: Progressing     Problem: Safety - Adult  Goal: Free from fall injury  12/23/2024 1319 by Saba Armstrong RN  Outcome: Progressing  12/23/2024 1319 by Saba Armstrong RN  Outcome: Progressing  12/23/2024 0506 by Yue Buenrostro

## 2024-12-23 NOTE — PROGRESS NOTES
TRANSFER - IN REPORT:    Verbal report received from ED RN on Maria Alejandra Castillo  being received from ED for routine progression of patient care      Report consisted of patient's Situation, Background, Assessment and   Recommendations(SBAR).     Information from the following report(s) Nurse Handoff Report was reviewed with the receiving nurse.    Opportunity for questions and clarification was provided.      Assessment completed upon patient's arrival to unit and care assumed.

## 2024-12-23 NOTE — BRIEF OP NOTE
Brief Postoperative Note      Patient: Maria Alejandra Castillo  YOB: 1945  MRN: [id number]    Date of Procedure: 12/23/2024    Pre-Op Diagnosis Codes:      * Hematuria, unspecified type [R31.9] + bladder tumor    Post-Op Diagnosis: Same       Procedure(s):  CYSTOSCOPY, CLOT EVACUATION,  TRANSURETHRAL RESECTION BLADDER (large)    Surgeon(s):  Riley Lacey MD    Assistant:  * No surgical staff found *    Anesthesia: General    Estimated Blood Loss (mL): Minimal    Complications: None    Specimens:   ID Type Source Tests Collected by Time Destination   A : BLADDER TUMOR Tissue Bladder SURGICAL PATHOLOGY Riley Lacey MD 12/23/2024 1253        Implants:  * No implants in log *      Drains:   Urinary Catheter 12/23/24 2 Way (Active)   $ Urethral catheter insertion $ Not inserted for procedure 12/23/24 0316   Catheter Indications Urinary retention (acute or chronic), continuous bladder irrigation or bladder outlet obstruction 12/23/24 0740   Site Assessment No urethral drainage 12/23/24 0740   Urine Color Bloody 12/23/24 0740   Urine Appearance Clots 12/23/24 0740   Urine Odor Other (Comment) 12/23/24 0740   Collection Container Standard 12/23/24 0740   Securement Method Other (Comment) 12/23/24 0740   Catheter Care  Perineal wipes 12/23/24 0740   Catheter Best Practices  Drainage tube clipped to bed;Catheter secured to thigh;Tamper seal intact;Bag below bladder;Bag not on floor;Lack of dependent loop in tubing;Drainage bag less than half full 12/23/24 0740   Status Draining;Patent 12/23/24 0740       [REMOVED] External Urinary Catheter (Removed)   Site Assessment Clean,dry & intact 12/23/24 0210   Placement Initiated 12/23/24 0210   Catheter Care Catheter/Wick replaced;Suction Canister/Tubing changed 12/23/24 0210   Perineal Care Yes 12/23/24 0210   Suction 40 mmgHg continuous 12/23/24 0210       Findings:  Infection Present At Time Of Surgery (PATOS) (choose all levels that have infection present):  No

## 2024-12-23 NOTE — PERIOP NOTE
TRANSFER - OUT REPORT:    Verbal report given to KENDRA Walter on Maria Alejandra Castillo  being transferred to [facility] for routine post-op       Report consisted of patient’s Situation, Background, Assessment and   Recommendations(SBAR).     Information from the following report(s) Nurse Handoff Report, Surgery Report, Intake/Output, MAR, Alarm Parameters, and Neuro Assessment was reviewed with the receiving nurse.    Lines:   Peripheral IV 12/23/24 Right;Anterior Forearm (Active)   Site Assessment Clean, dry & intact 12/23/24 1337   Line Status Normal saline locked 12/23/24 1337   Line Care Connections checked and tightened 12/23/24 1337   Phlebitis Assessment No symptoms 12/23/24 1337   Infiltration Assessment 0 12/23/24 1337   Dressing Status Clean, dry & intact 12/23/24 1337   Dressing Type Transparent 12/23/24 1337        Opportunity for questions and clarification was provided.      Patient transported with:   O2 @ 2 liters  Tech    VTE prophylaxis orders have been written for Maria Alejandra Castillo.    Patient and family given floor number and nurses name.  Family updated re: pt status after security code verified.

## 2024-12-23 NOTE — H&P
Hospitalist History and Physical   Admit Date:  2024  8:33 PM   Name:  Maria Alejandra Castillo   Age:  79 y.o.  Sex:  female  :  1945   MRN:  [id number]   Room:  [facility] [place] 2[place]    Presenting/Chief Complaint: Hematuria     Reason(s) for Admission: Hydroureteronephrosis [N13.30]  Diverticulosis [K57.90]  Obstructive uropathy [N13.9]  BRENDA (acute kidney injury) (HCC) [N17.9]  Mass of bladder [N32.89]  Anemia, unspecified type [D64.9]     History of Present Illness:   Maria Alejandra Castillo is a 79 y.o. female with medical history of hyperlipidemia, lumbar radiculopathy, type 2 diabetes, carotid endarterectomy, COPD, primary vulvar cancer, peripheral vascular disease, hypertension, hysterectomy with bilateral salpingo-oophorectomy.  underwent right posterior lateral    CT of the abdomen pelvis:  Right posterior lateral bladder mass moderate right obstructive uropathy also has hematoma in the bladder    Assessment & Plan:     Principal Problem:  Obstructive uropathy  Relieve obstruction   correct electrolytes    Evaluation  Active Problems:  Iron deficiency anemia  On iron sulfate 325 mg daily      Anxiety  As needed Ativan    Diabetic polyneuropathy associated with type 2 diabetes mellitus (HCC)  [place]      PT/OT evals ordered?  Therapy evals ordered  Diet: ADULT DIET; Regular; 3 carb choices (45 gm/meal); Low Fat/Low Chol/High Fiber/TONE  ADULT ORAL NUTRITION SUPPLEMENT; Breakfast, Lunch, Dinner; Diabetic Oral Supplement  VTE prophylaxis: lovenox        Code status: Full Code        Non-peripheral Lines and Tubes (if present):             Hospital Problems:  Principal Problem:    Obstructive uropathy  Active Problems:    Iron deficiency anemia    Anxiety    Diabetic polyneuropathy associated with type 2 diabetes mellitus (HCC)    Chronic obstructive pulmonary disease (HCC)    Carotid stenosis, right    H/O carotid endarterectomy    Essential hypertension    Diabetes mellitus (HCC)    Dyslipidemia    Lumbar radiculopathy

## 2024-12-23 NOTE — ED PROVIDER NOTES
ED ATTENDING SHARED SERVICES NOTE:     I have seen and evaluated the patient and performed an independent history and physical exam, and I agree with the assessment and plan as documented in the advanced provider note.  I performed the medical decision making in its entirety.  With the following updates: Bladder mass causing right obstructive hydronephrosis with elevated BUN and creatinine and bladder hematoma.  Plan to discuss with urology for likely admission.                                 Galilea Hill MD  12/22/24 6641    
you sick: Not asked        Previous Medications    ALBUTEROL SULFATE HFA (PROVENTIL;VENTOLIN;PROAIR) 108 (90 BASE) MCG/ACT INHALER    Inhale 1 puff into the lungs every 6 hours as needed for Wheezing or Shortness of Breath    AMLODIPINE (NORVASC) 5 MG TABLET    Take 1 tablet by mouth daily    ASCORBIC ACID (VITAMIN C) 250 MG TABLET    Take 250 mg by mouth daily    ASPIRIN 81 MG EC TABLET    Take 81 mg by mouth daily    BLOOD GLUCOSE MONITOR STRIPS    Test 2 times a day & as needed for symptoms of irregular blood glucose. Dispense sufficient amount for indicated testing frequency plus additional to accommodate PRN testing needs.    CONTINUOUS BLOOD GLUC  (DEXCOM G6 ) NICKY    For uncontrolled DM Type 2 insulin requiring.    CONTINUOUS BLOOD GLUC  (FREESTYLE LES 2 READER) NICKY    For diabetes Type 2 uncontrolled glucose monitoring.    CONTINUOUS BLOOD GLUC SENSOR (DEXCOM G6 SENSOR) MISC    For uncontrolled Diabetes Type 2 insulin requiring.    CONTINUOUS BLOOD GLUC SENSOR (FREESTYLE LES 2 SENSOR) MISC    For diabetes Type 2 uncontrolled glucose monitoring.    CONTINUOUS BLOOD GLUC TRANSMIT (DEXCOM G6 TRANSMITTER) MISC    For DM Type 2 insulin requiring uncontrolled.    CYANOCOBALAMIN 2000 MCG TABLET    Take 1 tablet by mouth daily    FAMOTIDINE (PEPCID) 20 MG TABLET    Take 1 tablet by mouth daily as needed (heartburn)    FERROUS SULFATE (IRON 325) 325 (65 FE) MG TABLET    Take 650 mg by mouth daily    FOSINOPRIL (MONOPRIL) 40 MG TABLET    Take 1 tablet by mouth daily    GLUCAGON (GVOKE HYPOPEN 2-PACK) 1 MG/0.2ML SOAJ    Inject 1 mg into the skin as needed (severe hypoglycemia) May repeat dose x 1 after 15 minutes.    GLUCOSE MONITORING (FREESTYLE FREEDOM) KIT    1 kit by Does not apply route daily Meter of choice per insurance and patient.    INSULIN ASPART (NOVOLOG FLEXPEN) 100 UNIT/ML INJECTION PEN    Correction Scale three times daily SQ before meals for fasting glucose:   Less than 150

## 2024-12-24 ENCOUNTER — APPOINTMENT (OUTPATIENT)
Dept: CT IMAGING | Age: 79
DRG: 669 | End: 2024-12-24
Payer: MEDICARE

## 2024-12-24 LAB
ANION GAP SERPL CALC-SCNC: 9 MMOL/L (ref 7–16)
BASOPHILS # BLD: 0 K/UL (ref 0–0.2)
BASOPHILS NFR BLD: 0 % (ref 0–2)
BUN SERPL-MCNC: 23 MG/DL (ref 8–23)
CALCIUM SERPL-MCNC: 9.2 MG/DL (ref 8.8–10.2)
CHLORIDE SERPL-SCNC: 99 MMOL/L (ref 98–107)
CO2 SERPL-SCNC: 29 MMOL/L (ref 20–29)
CREAT SERPL-MCNC: 1.33 MG/DL (ref 0.6–1.1)
DIFFERENTIAL METHOD BLD: ABNORMAL
EOSINOPHIL # BLD: 0 K/UL (ref 0–0.8)
EOSINOPHIL NFR BLD: 0 % (ref 0.5–7.8)
ERYTHROCYTE [DISTWIDTH] IN BLOOD BY AUTOMATED COUNT: 18.3 % (ref 11.9–14.6)
EST. AVERAGE GLUCOSE BLD GHB EST-MCNC: 161 MG/DL
GLUCOSE BLD STRIP.AUTO-MCNC: 169 MG/DL (ref 65–100)
GLUCOSE BLD STRIP.AUTO-MCNC: 211 MG/DL (ref 65–100)
GLUCOSE BLD STRIP.AUTO-MCNC: 272 MG/DL (ref 65–100)
GLUCOSE BLD STRIP.AUTO-MCNC: 293 MG/DL (ref 65–100)
GLUCOSE BLD STRIP.AUTO-MCNC: 313 MG/DL (ref 65–100)
GLUCOSE SERPL-MCNC: 191 MG/DL (ref 70–99)
HBA1C MFR BLD: 7.2 % (ref 0–5.6)
HCT VFR BLD AUTO: 24.7 % (ref 35.8–46.3)
HGB BLD-MCNC: 7.4 G/DL (ref 11.7–15.4)
IMM GRANULOCYTES # BLD AUTO: 0.1 K/UL (ref 0–0.5)
IMM GRANULOCYTES NFR BLD AUTO: 1 % (ref 0–5)
LYMPHOCYTES # BLD: 0.7 K/UL (ref 0.5–4.6)
LYMPHOCYTES NFR BLD: 5 % (ref 13–44)
MCH RBC QN AUTO: 27.5 PG (ref 26.1–32.9)
MCHC RBC AUTO-ENTMCNC: 30 G/DL (ref 31.4–35)
MCV RBC AUTO: 91.8 FL (ref 82–102)
MONOCYTES # BLD: 1 K/UL (ref 0.1–1.3)
MONOCYTES NFR BLD: 8 % (ref 4–12)
NEUTS SEG # BLD: 11.1 K/UL (ref 1.7–8.2)
NEUTS SEG NFR BLD: 86 % (ref 43–78)
NRBC # BLD: 0 K/UL (ref 0–0.2)
PLATELET # BLD AUTO: 379 K/UL (ref 150–450)
PMV BLD AUTO: 11.3 FL (ref 9.4–12.3)
POTASSIUM SERPL-SCNC: 3.9 MMOL/L (ref 3.5–5.1)
RBC # BLD AUTO: 2.69 M/UL (ref 4.05–5.2)
SERVICE CMNT-IMP: ABNORMAL
SODIUM SERPL-SCNC: 137 MMOL/L (ref 136–145)
WBC # BLD AUTO: 13 K/UL (ref 4.3–11.1)

## 2024-12-24 PROCEDURE — 6370000000 HC RX 637 (ALT 250 FOR IP): Performed by: STUDENT IN AN ORGANIZED HEALTH CARE EDUCATION/TRAINING PROGRAM

## 2024-12-24 PROCEDURE — 2500000003 HC RX 250 WO HCPCS: Performed by: INTERNAL MEDICINE

## 2024-12-24 PROCEDURE — 71260 CT THORAX DX C+: CPT

## 2024-12-24 PROCEDURE — 6360000004 HC RX CONTRAST MEDICATION: Performed by: NURSE PRACTITIONER

## 2024-12-24 PROCEDURE — 1100000003 HC PRIVATE W/ TELEMETRY

## 2024-12-24 PROCEDURE — 85025 COMPLETE CBC W/AUTO DIFF WBC: CPT

## 2024-12-24 PROCEDURE — 99231 SBSQ HOSP IP/OBS SF/LOW 25: CPT | Performed by: PHYSICIAN ASSISTANT

## 2024-12-24 PROCEDURE — 80048 BASIC METABOLIC PNL TOTAL CA: CPT

## 2024-12-24 PROCEDURE — 83036 HEMOGLOBIN GLYCOSYLATED A1C: CPT

## 2024-12-24 PROCEDURE — 6370000000 HC RX 637 (ALT 250 FOR IP): Performed by: INTERNAL MEDICINE

## 2024-12-24 PROCEDURE — 82962 GLUCOSE BLOOD TEST: CPT

## 2024-12-24 PROCEDURE — 36415 COLL VENOUS BLD VENIPUNCTURE: CPT

## 2024-12-24 RX ORDER — INSULIN LISPRO 100 [IU]/ML
5 INJECTION, SOLUTION INTRAVENOUS; SUBCUTANEOUS
Status: DISCONTINUED | OUTPATIENT
Start: 2024-12-24 | End: 2024-12-25 | Stop reason: HOSPADM

## 2024-12-24 RX ORDER — INSULIN GLARGINE 100 [IU]/ML
20 INJECTION, SOLUTION SUBCUTANEOUS NIGHTLY
Status: DISCONTINUED | OUTPATIENT
Start: 2024-12-24 | End: 2024-12-25 | Stop reason: HOSPADM

## 2024-12-24 RX ORDER — IOPAMIDOL 755 MG/ML
100 INJECTION, SOLUTION INTRAVASCULAR
Status: COMPLETED | OUTPATIENT
Start: 2024-12-24 | End: 2024-12-24

## 2024-12-24 RX ADMIN — SODIUM CHLORIDE, PRESERVATIVE FREE 10 ML: 5 INJECTION INTRAVENOUS at 10:11

## 2024-12-24 RX ADMIN — INSULIN LISPRO 6 UNITS: 100 INJECTION, SOLUTION INTRAVENOUS; SUBCUTANEOUS at 20:26

## 2024-12-24 RX ADMIN — OXYCODONE HYDROCHLORIDE AND ACETAMINOPHEN 250 MG: 500 TABLET ORAL at 10:09

## 2024-12-24 RX ADMIN — INSULIN LISPRO 0 UNITS: 100 INJECTION, SOLUTION INTRAVENOUS; SUBCUTANEOUS at 10:15

## 2024-12-24 RX ADMIN — INSULIN LISPRO 3 UNITS: 100 INJECTION, SOLUTION INTRAVENOUS; SUBCUTANEOUS at 10:08

## 2024-12-24 RX ADMIN — FERROUS SULFATE TAB 325 MG (65 MG ELEMENTAL FE) 650 MG: 325 (65 FE) TAB at 10:10

## 2024-12-24 RX ADMIN — SODIUM CHLORIDE, PRESERVATIVE FREE 10 ML: 5 INJECTION INTRAVENOUS at 20:26

## 2024-12-24 RX ADMIN — INSULIN LISPRO 5 UNITS: 100 INJECTION, SOLUTION INTRAVENOUS; SUBCUTANEOUS at 18:46

## 2024-12-24 RX ADMIN — IOPAMIDOL 70 ML: 755 INJECTION, SOLUTION INTRAVENOUS at 07:23

## 2024-12-24 RX ADMIN — INSULIN LISPRO 8 UNITS: 100 INJECTION, SOLUTION INTRAVENOUS; SUBCUTANEOUS at 12:51

## 2024-12-24 RX ADMIN — TRIAMTERENE AND HYDROCHLOROTHIAZIDE 0.5 TABLET: 37.5; 25 TABLET ORAL at 10:09

## 2024-12-24 RX ADMIN — INSULIN GLARGINE 20 UNITS: 100 INJECTION, SOLUTION SUBCUTANEOUS at 18:45

## 2024-12-24 RX ADMIN — LISINOPRIL 40 MG: 20 TABLET ORAL at 10:10

## 2024-12-24 RX ADMIN — ROSUVASTATIN CALCIUM 10 MG: 10 TABLET, FILM COATED ORAL at 10:08

## 2024-12-24 RX ADMIN — ASPIRIN 81 MG: 81 TABLET, COATED ORAL at 10:08

## 2024-12-24 RX ADMIN — AMLODIPINE BESYLATE 5 MG: 5 TABLET ORAL at 10:08

## 2024-12-24 RX ADMIN — CYANOCOBALAMIN TAB 1000 MCG 2000 MCG: 1000 TAB at 10:51

## 2024-12-24 ASSESSMENT — PAIN SCALES - GENERAL
PAINLEVEL_OUTOF10: 0
PAINLEVEL_OUTOF10: 0

## 2024-12-24 NOTE — PROGRESS NOTES
Patient medicated per MAR. Pt's blood sugar was 496 at beginning of shift, requiring 10 units of insulin. 2 hour recheck, blood sugar came down to 406, requiring another dose of 10 units insulin. Next recheck, blood sugar was 293. No c/o pain. There was a total of 1,650 mLs of red urine emptied from mederos. Tele has been running NSR at 74 pm and 69 bpm per monitor room tech. CT tech called floor around 03:00 stating that she was waiting for pt's creatinine to result and that she would place transfer orders once this occurred. All known needs met at this time. Bed locked and lowered with call light within reach.

## 2024-12-24 NOTE — CARE COORDINATION
Chart reviewed and patient discussed in IDT rounds this AM. Patient lives alone, has local children with assistance as needed. Discharge plan is to return home.     Joshua HUDSON, ACM  [facility] [facility]

## 2024-12-24 NOTE — PROGRESS NOTES
Urinary Catheter removed at 1117. Patient provided with brief; large container of ice water and 12oz can of Ginger Ale (Zero).    1159  100mL collected, measured and flushed.

## 2024-12-24 NOTE — PROGRESS NOTES
Hospitalist Progress Note   Admit Date:  2024  8:33 PM   Name:  Maria Alejandra Castillo   Age:  79 y.o.  Sex:  female  :  1945   MRN:  [id number]   Room:  [facility]/    Presenting/Chief Complaint: Hematuria     Reason(s) for Admission: Hydroureteronephrosis [N13.30]  Diverticulosis [K57.90]  Obstructive uropathy [N13.9]  BRENDA (acute kidney injury) (HCC) [N17.9]  Mass of bladder [N32.89]  Anemia, unspecified type [D64.9]     Hospital Course:   Maria Alejandra Castillo is a 79 y.o. female with medical history of hyperlipidemia, lumbar radiculopathy, type 2 diabetes, carotid endarterectomy, COPD, vulvar cancer, peripheral vascular disease, hypertension, and hysterectomy who presented with hematuria.  Patient was seen at urgent care and given Macrobid however continues to have severe pain with urination prompting her to come to the emergency room.  In the emergency room patient was found to have josie hematuria.  CT abdomen and pelvis with IV contrast showed moderate right hydroureteronephrosis with obstruction at the level of the distal ureter due to bladder mass.  On imaging there is focal mass lesion along the right posterior lateral bladder wall measuring 4.6 x 2.2 cm.  Urology consulted and performed cystourethroscopy on .      Subjective & 24hr Events:   Patient evaluated at bedside.  Patient resting in bed with no acute events overnight.  Hemoglobin is noted to have down trended.      Assessment & Plan:     Obstructive uropathy with hematuria:  Bladder mass:  -Urology consulted, status post cystourethroscopy on   -Urine cultures show mixed skin beth  -Initially started on Rocephin, will discontinue  -Urology recommends Serna removal   -Encourage p.o. intake    Iron deficiency anemia:  -Acute on chronic anemia:  -Hemoglobin 9.9 on admission, downtrend to 7.4  -Monitor closely, status post urologic procedure on   -Transfuse for hemoglobin less than 7    Hypertension:  -Continue home Norvasc,

## 2024-12-24 NOTE — PROGRESS NOTES
Admit Date: 12/22/2024    Subjective:     Patient has no new complaints.     Objective:     Patient Vitals for the past 8 hrs:   BP Temp Temp src Pulse Resp SpO2   12/24/24 1008 133/67 -- -- -- -- --   12/24/24 0710 133/67 97.8 °F (36.6 °C) Oral 93 18 93 %   12/24/24 0353 (!) 139/50 97.7 °F (36.5 °C) Oral 80 16 97 %     12/24 0701 - 12/24 1900  In: 10 [I.V.:10]  Out: 750 [Urine:750]  12/22 1901 - 12/24 0700  In: 1000   Out: 1650 [Urine:1650]    Physical Exam:  GENERAL ASSESSMENT: alert, oriented to person, place and time, no acute distress and no anxiety, depression or agitation  Chest: Easy work of breathing  CVS exam: RRR  ABDOMEN: not done  Neurological exam reveals alert, oriented, normal speech, no focal findings or movement disorder noted.  FEMALE GENITOURINARY EXAM: mederos draining clear pink urine without clots  MALE GENITAL EXAM: not done        Data Review   Recent Results (from the past 24 hour(s))   POCT Glucose    Collection Time: 12/23/24  1:23 PM   Result Value Ref Range    POC Glucose 196 (H) 65 - 100 mg/dL    Performed by: Heriberto    POCT Glucose    Collection Time: 12/23/24  2:30 PM   Result Value Ref Range    POC Glucose 212 (H) 65 - 100 mg/dL    Performed by: Greg    POCT Glucose    Collection Time: 12/23/24  7:52 PM   Result Value Ref Range    POC Glucose 496 (HH) 65 - 100 mg/dL    Performed by: KaushalniaPCT    POCT Glucose    Collection Time: 12/23/24 10:49 PM   Result Value Ref Range    POC Glucose 406 (H) 65 - 100 mg/dL    Performed by: StefanvoniaPCT    POCT Glucose    Collection Time: 12/24/24  1:14 AM   Result Value Ref Range    POC Glucose 293 (H) 65 - 100 mg/dL    Performed by: Chino    Basic Metabolic Panel w/ Reflex to MG    Collection Time: 12/24/24  4:58 AM   Result Value Ref Range    Sodium 137 136 - 145 mmol/L    Potassium 3.9 3.5 - 5.1 mmol/L    Chloride 99 98 - 107 mmol/L    CO2 29 20 - 29 mmol/L    Anion Gap 9 7 - 16 mmol/L    Glucose

## 2024-12-24 NOTE — PLAN OF CARE

## 2024-12-25 VITALS
SYSTOLIC BLOOD PRESSURE: 113 MMHG | OXYGEN SATURATION: 95 % | HEART RATE: 75 BPM | BODY MASS INDEX: 26.61 KG/M2 | RESPIRATION RATE: 16 BRPM | HEIGHT: 59 IN | TEMPERATURE: 98.2 F | DIASTOLIC BLOOD PRESSURE: 68 MMHG | WEIGHT: 132 LBS

## 2024-12-25 LAB
ANION GAP SERPL CALC-SCNC: 12 MMOL/L (ref 7–16)
BACTERIA SPEC CULT: NORMAL
BASOPHILS # BLD: 0.1 K/UL (ref 0–0.2)
BASOPHILS NFR BLD: 1 % (ref 0–2)
BUN SERPL-MCNC: 27 MG/DL (ref 8–23)
CALCIUM SERPL-MCNC: 8.7 MG/DL (ref 8.8–10.2)
CHLORIDE SERPL-SCNC: 101 MMOL/L (ref 98–107)
CO2 SERPL-SCNC: 26 MMOL/L (ref 20–29)
CREAT SERPL-MCNC: 1.41 MG/DL (ref 0.6–1.1)
DIFFERENTIAL METHOD BLD: ABNORMAL
EOSINOPHIL # BLD: 0.2 K/UL (ref 0–0.8)
EOSINOPHIL NFR BLD: 2 % (ref 0.5–7.8)
ERYTHROCYTE [DISTWIDTH] IN BLOOD BY AUTOMATED COUNT: 18.8 % (ref 11.9–14.6)
GLUCOSE BLD STRIP.AUTO-MCNC: 152 MG/DL (ref 65–100)
GLUCOSE BLD STRIP.AUTO-MCNC: 158 MG/DL (ref 65–100)
GLUCOSE SERPL-MCNC: 116 MG/DL (ref 70–99)
HCT VFR BLD AUTO: 25.8 % (ref 35.8–46.3)
HGB BLD-MCNC: 7.6 G/DL (ref 11.7–15.4)
IMM GRANULOCYTES # BLD AUTO: 0.2 K/UL (ref 0–0.5)
IMM GRANULOCYTES NFR BLD AUTO: 2 % (ref 0–5)
LYMPHOCYTES # BLD: 1.2 K/UL (ref 0.5–4.6)
LYMPHOCYTES NFR BLD: 11 % (ref 13–44)
MCH RBC QN AUTO: 27.2 PG (ref 26.1–32.9)
MCHC RBC AUTO-ENTMCNC: 29.5 G/DL (ref 31.4–35)
MCV RBC AUTO: 92.5 FL (ref 82–102)
MONOCYTES # BLD: 0.9 K/UL (ref 0.1–1.3)
MONOCYTES NFR BLD: 9 % (ref 4–12)
NEUTS SEG # BLD: 7.9 K/UL (ref 1.7–8.2)
NEUTS SEG NFR BLD: 75 % (ref 43–78)
NRBC # BLD: 0.1 K/UL (ref 0–0.2)
PLATELET # BLD AUTO: 324 K/UL (ref 150–450)
PMV BLD AUTO: 11.1 FL (ref 9.4–12.3)
POTASSIUM SERPL-SCNC: 4 MMOL/L (ref 3.5–5.1)
RBC # BLD AUTO: 2.79 M/UL (ref 4.05–5.2)
SERVICE CMNT-IMP: ABNORMAL
SERVICE CMNT-IMP: ABNORMAL
SERVICE CMNT-IMP: NORMAL
SODIUM SERPL-SCNC: 139 MMOL/L (ref 136–145)
WBC # BLD AUTO: 10.5 K/UL (ref 4.3–11.1)

## 2024-12-25 PROCEDURE — 36415 COLL VENOUS BLD VENIPUNCTURE: CPT

## 2024-12-25 PROCEDURE — 82962 GLUCOSE BLOOD TEST: CPT

## 2024-12-25 PROCEDURE — 85025 COMPLETE CBC W/AUTO DIFF WBC: CPT

## 2024-12-25 PROCEDURE — 6370000000 HC RX 637 (ALT 250 FOR IP): Performed by: INTERNAL MEDICINE

## 2024-12-25 PROCEDURE — 80048 BASIC METABOLIC PNL TOTAL CA: CPT

## 2024-12-25 PROCEDURE — 6370000000 HC RX 637 (ALT 250 FOR IP): Performed by: STUDENT IN AN ORGANIZED HEALTH CARE EDUCATION/TRAINING PROGRAM

## 2024-12-25 RX ADMIN — CYANOCOBALAMIN TAB 1000 MCG 2000 MCG: 1000 TAB at 08:56

## 2024-12-25 RX ADMIN — OXYCODONE HYDROCHLORIDE AND ACETAMINOPHEN 250 MG: 500 TABLET ORAL at 08:56

## 2024-12-25 RX ADMIN — FERROUS SULFATE TAB 325 MG (65 MG ELEMENTAL FE) 650 MG: 325 (65 FE) TAB at 08:56

## 2024-12-25 RX ADMIN — AMLODIPINE BESYLATE 5 MG: 5 TABLET ORAL at 08:55

## 2024-12-25 RX ADMIN — LISINOPRIL 40 MG: 20 TABLET ORAL at 08:55

## 2024-12-25 RX ADMIN — TRIAMTERENE AND HYDROCHLOROTHIAZIDE 0.5 TABLET: 37.5; 25 TABLET ORAL at 08:56

## 2024-12-25 RX ADMIN — INSULIN LISPRO 5 UNITS: 100 INJECTION, SOLUTION INTRAVENOUS; SUBCUTANEOUS at 08:56

## 2024-12-25 RX ADMIN — ROSUVASTATIN CALCIUM 10 MG: 10 TABLET, FILM COATED ORAL at 08:56

## 2024-12-25 RX ADMIN — ASPIRIN 81 MG: 81 TABLET, COATED ORAL at 08:55

## 2024-12-25 NOTE — DISCHARGE SUMMARY
Hospitalist Discharge Summary   Admit Date:  2024  8:33 PM   DC Note date: 2024  Name:  Maria Alejandra Castillo   Age:  79 y.o.  Sex:  female  :  1945   MRN:  [id number]   Room:  [facility]  PCP:  Elva Izaguirre DO    Presenting Complaint: Hematuria     Initial Admission Diagnosis: Hydroureteronephrosis [N13.30]  Diverticulosis [K57.90]  Obstructive uropathy [N13.9]  BRENDA (acute kidney injury) (HCC) [N17.9]  Mass of bladder [N32.89]  Anemia, unspecified type [D64.9]     Problem List for this Hospitalization (present on admission):    Principal Problem:    Obstructive uropathy  Active Problems:    Iron deficiency anemia    Anxiety    Diabetic polyneuropathy associated with type 2 diabetes mellitus (HCC)    Chronic obstructive pulmonary disease (HCC)    Carotid stenosis, right    H/O carotid endarterectomy    Essential hypertension    Diabetes mellitus (HCC)    Dyslipidemia    Lumbar radiculopathy    Hypercholesterolemia  Resolved Problems:    * No resolved hospital problems. *      Hospital Course:  Maria Alejandra Castillo is a 79 y.o. female with medical history of hyperlipidemia, lumbar radiculopathy, type 2 diabetes, carotid endarterectomy, COPD, vulvar cancer, peripheral vascular disease, hypertension, and hysterectomy who presented with hematuria.  Patient was seen at urgent care and given Macrobid however continues to have severe pain with urination prompting her to come to the emergency room.  In the emergency room patient was found to have josie hematuria.  CT abdomen and pelvis with IV contrast showed moderate right hydroureteronephrosis with obstruction at the level of the distal ureter due to bladder mass.  On imaging there is focal mass lesion along the right posterior lateral bladder wall measuring 4.6 x 2.2 cm.  Urology consulted and performed cystourethroscopy on .  Surgical biopsy of bladder tumor shows invasive urothelial carcinoma.  Voiding trial on  successful.  Renal function

## 2024-12-26 NOTE — PROGRESS NOTES
NEW PATIENT INTAKE    Referral Diagnosis: Bladder cancer     Referring Provider:  Riley Lacey MD    Primary Care Provider: Elva Izaguirre DO    Family History of Cancer/ Hematology Disorders: Mother with unspecified type of cancer; father with colon cancer; two sisters with breast cancer; and brother with lung cancer     Presenting Symptoms:  Severe pain with urination and hematuria     Chronological History of Pertinent Events:  Ms. Castillo is a 79-year-old white female referred for bladder cancer. PMH includes hyperlipidemia, lumbar radiculopathy, type 2 diabetes, carotid endarterectomy, COPD, vulvar cancer (followed by Dr. Lamb), peripheral vascular disease, hypertension, and hysterectomy.     12/22/24 - 12/25/24: Pt presented to the [facility] ED with complaints of severe pain with urination and josie hematuria. She reported she was diagnosed with a UTI around [facility], and she had finished a course of Macrobid. Her symptoms initially resolved but are now back and she has been feeling unwell for about a month. Her HGB was 9.9 with labs in the ED. CT abdomen and pelvis with IV contrast 12/22/24 showed moderate right hydroureteronephrosis with obstruction at the level of the distal ureter due to bladder mass. On imaging there was a focal mass lesion along the right posterior lateral bladder wall measuring 4.6 x 2.2 cm. Urology was consulted and Dr. Riley Lacey performed cystourethroscopy with evacuation of clot and large transurethral resection of bladder tumor on 12/23/24. Surgical biopsy of bladder tumor showed invasive urothelial carcinoma. CT chest on 12/24/24 was unremarkable. HGB was down to 7.6 on day of discharge. Pt was discharged on 650 mg of ferrous sulfate with vitamin C daily. She is referred to Uro/Onc at [facility] at discharge.     12/22/24: CT ABDOMEN PELVIS W IV CONTRAST: Right posteriolateral bladder mass causing moderate right obstructive uropathy. Hematoma present in the bladder.

## 2024-12-27 ENCOUNTER — PREP FOR PROCEDURE (OUTPATIENT)
Dept: ONCOLOGY | Age: 79
End: 2024-12-27

## 2024-12-27 ENCOUNTER — OFFICE VISIT (OUTPATIENT)
Dept: ONCOLOGY | Age: 79
End: 2024-12-27
Payer: MEDICARE

## 2024-12-27 ENCOUNTER — HOSPITAL ENCOUNTER (OUTPATIENT)
Dept: LAB | Age: 79
Discharge: HOME | End: 2024-12-27
Payer: MEDICARE

## 2024-12-27 VITALS
SYSTOLIC BLOOD PRESSURE: 146 MMHG | TEMPERATURE: 97.5 F | OXYGEN SATURATION: 97 % | HEART RATE: 74 BPM | BODY MASS INDEX: 26.73 KG/M2 | DIASTOLIC BLOOD PRESSURE: 63 MMHG | WEIGHT: 132.6 LBS | RESPIRATION RATE: 16 BRPM | HEIGHT: 59 IN

## 2024-12-27 DIAGNOSIS — C68.9 UROTHELIAL CARCINOMA (HCC): ICD-10-CM

## 2024-12-27 DIAGNOSIS — C68.9 UROTHELIAL CARCINOMA (HCC): Primary | ICD-10-CM

## 2024-12-27 LAB
ALBUMIN SERPL-MCNC: 3.4 G/DL (ref 3.2–4.6)
ALBUMIN/GLOB SERPL: 1 (ref 1–1.9)
ALP SERPL-CCNC: 86 U/L (ref 35–104)
ALT SERPL-CCNC: 31 U/L (ref 8–45)
ANION GAP SERPL CALC-SCNC: 10 MMOL/L (ref 7–16)
AST SERPL-CCNC: 30 U/L (ref 15–37)
BASOPHILS # BLD: 0 K/UL (ref 0–0.2)
BASOPHILS NFR BLD: 0 % (ref 0–2)
BILIRUB SERPL-MCNC: <0.2 MG/DL (ref 0–1.2)
BUN SERPL-MCNC: 20 MG/DL (ref 8–23)
CALCIUM SERPL-MCNC: 9.7 MG/DL (ref 8.8–10.2)
CHLORIDE SERPL-SCNC: 101 MMOL/L (ref 98–107)
CO2 SERPL-SCNC: 28 MMOL/L (ref 20–29)
CREAT SERPL-MCNC: 1.22 MG/DL (ref 0.6–1.1)
DIFFERENTIAL METHOD BLD: ABNORMAL
EOSINOPHIL # BLD: 0.2 K/UL (ref 0–0.8)
EOSINOPHIL NFR BLD: 1 % (ref 0.5–7.8)
ERYTHROCYTE [DISTWIDTH] IN BLOOD BY AUTOMATED COUNT: 20.1 % (ref 11.9–14.6)
GLOBULIN SER CALC-MCNC: 3.4 G/DL (ref 2.3–3.5)
GLUCOSE SERPL-MCNC: 161 MG/DL (ref 70–99)
HCT VFR BLD AUTO: 29.7 % (ref 35.8–46.3)
HGB BLD-MCNC: 8.7 G/DL (ref 11.7–15.4)
IMM GRANULOCYTES # BLD AUTO: 0.2 K/UL (ref 0–0.5)
IMM GRANULOCYTES NFR BLD AUTO: 2 % (ref 0–5)
LYMPHOCYTES # BLD: 0.8 K/UL (ref 0.5–4.6)
LYMPHOCYTES NFR BLD: 8 % (ref 13–44)
MCH RBC QN AUTO: 27.7 PG (ref 26.1–32.9)
MCHC RBC AUTO-ENTMCNC: 29.3 G/DL (ref 31.4–35)
MCV RBC AUTO: 94.6 FL (ref 82–102)
MONOCYTES # BLD: 0.8 K/UL (ref 0.1–1.3)
MONOCYTES NFR BLD: 8 % (ref 4–12)
NEUTS SEG # BLD: 8.4 K/UL (ref 1.7–8.2)
NEUTS SEG NFR BLD: 81 % (ref 43–78)
NRBC # BLD: 0.07 K/UL (ref 0–0.2)
PLATELET # BLD AUTO: 427 K/UL (ref 150–450)
PMV BLD AUTO: 10.8 FL (ref 9.4–12.3)
POTASSIUM SERPL-SCNC: 3.9 MMOL/L (ref 3.5–5.1)
PROT SERPL-MCNC: 6.8 G/DL (ref 6.3–8.2)
RBC # BLD AUTO: 3.14 M/UL (ref 4.05–5.2)
SODIUM SERPL-SCNC: 139 MMOL/L (ref 136–145)
WBC # BLD AUTO: 10.4 K/UL (ref 4.3–11.1)

## 2024-12-27 PROCEDURE — 3077F SYST BP >= 140 MM HG: CPT | Performed by: UROLOGY

## 2024-12-27 PROCEDURE — 1090F PRES/ABSN URINE INCON ASSESS: CPT | Performed by: UROLOGY

## 2024-12-27 PROCEDURE — 1123F ACP DISCUSS/DSCN MKR DOCD: CPT | Performed by: UROLOGY

## 2024-12-27 PROCEDURE — 36415 COLL VENOUS BLD VENIPUNCTURE: CPT

## 2024-12-27 PROCEDURE — 80053 COMPREHEN METABOLIC PANEL: CPT

## 2024-12-27 PROCEDURE — 4004F PT TOBACCO SCREEN RCVD TLK: CPT | Performed by: UROLOGY

## 2024-12-27 PROCEDURE — 1111F DSCHRG MED/CURRENT MED MERGE: CPT | Performed by: UROLOGY

## 2024-12-27 PROCEDURE — G8419 CALC BMI OUT NRM PARAM NOF/U: HCPCS | Performed by: UROLOGY

## 2024-12-27 PROCEDURE — 85025 COMPLETE CBC W/AUTO DIFF WBC: CPT

## 2024-12-27 PROCEDURE — G8428 CUR MEDS NOT DOCUMENT: HCPCS | Performed by: UROLOGY

## 2024-12-27 PROCEDURE — 99215 OFFICE O/P EST HI 40 MIN: CPT | Performed by: UROLOGY

## 2024-12-27 PROCEDURE — 1126F AMNT PAIN NOTED NONE PRSNT: CPT | Performed by: UROLOGY

## 2024-12-27 PROCEDURE — G8399 PT W/DXA RESULTS DOCUMENT: HCPCS | Performed by: UROLOGY

## 2024-12-27 PROCEDURE — 3078F DIAST BP <80 MM HG: CPT | Performed by: UROLOGY

## 2024-12-27 PROCEDURE — G8484 FLU IMMUNIZE NO ADMIN: HCPCS | Performed by: UROLOGY

## 2024-12-27 ASSESSMENT — PATIENT HEALTH QUESTIONNAIRE - PHQ9
SUM OF ALL RESPONSES TO PHQ9 QUESTIONS 1 & 2: 0
1. LITTLE INTEREST OR PLEASURE IN DOING THINGS: NOT AT ALL
2. FEELING DOWN, DEPRESSED OR HOPELESS: NOT AT ALL
SUM OF ALL RESPONSES TO PHQ QUESTIONS 1-9: 0

## 2024-12-27 NOTE — PATIENT INSTRUCTIONS
12/25/2024 0.10  0.0 - 0.2 K/uL Final    **Note: Absolute NRBC parameter is now reported with Hemogram**    Differential Type 12/25/2024 AUTOMATED    Final    Neutrophils % 12/25/2024 75  43 - 78 % Final    Lymphocytes % 12/25/2024 11 (L)  13 - 44 % Final    Monocytes % 12/25/2024 9  4.0 - 12.0 % Final    Eosinophils % 12/25/2024 2  0.5 - 7.8 % Final    Basophils % 12/25/2024 1  0.0 - 2.0 % Final    Immature Granulocytes % 12/25/2024 2  0.0 - 5.0 % Final    Neutrophils Absolute 12/25/2024 7.9  1.7 - 8.2 K/UL Final    Lymphocytes Absolute 12/25/2024 1.2  0.5 - 4.6 K/UL Final    Monocytes Absolute 12/25/2024 0.9  0.1 - 1.3 K/UL Final    Eosinophils Absolute 12/25/2024 0.2  0.0 - 0.8 K/UL Final    Basophils Absolute 12/25/2024 0.1  0.0 - 0.2 K/UL Final    Immature Granulocytes Absolute 12/25/2024 0.2  0.0 - 0.5 K/UL Final    POC Glucose 12/24/2024 272 (H)  65 - 100 mg/dL Final    Comment: 47 - 60 mg/dl Consistent with, but not fully diagnostic of hypoglycemia.  101 - 125 mg/dl Impaired fasting glucose/consistent with pre-diabetes mellitus  > 126 mg/dl Fasting glucose consistent with overt diabetes mellitus      Performed by: 12/24/2024 Colin   Final    POC Glucose 12/25/2024 158 (H)  65 - 100 mg/dL Final    Comment: 47 - 60 mg/dl Consistent with, but not fully diagnostic of hypoglycemia.  101 - 125 mg/dl Impaired fasting glucose/consistent with pre-diabetes mellitus  > 126 mg/dl Fasting glucose consistent with overt diabetes mellitus      Performed by: 12/25/2024 Bibi   Final    POC Glucose 12/25/2024 152 (H)  65 - 100 mg/dL Final    Comment: 47 - 60 mg/dl Consistent with, but not fully diagnostic of hypoglycemia.  101 - 125 mg/dl Impaired fasting glucose/consistent with pre-diabetes mellitus  > 126 mg/dl Fasting glucose consistent with overt diabetes mellitus      Performed by: 12/25/2024 Bibi   Final               Please refer to After Visit Summary or MyChart for upcoming

## 2024-12-27 NOTE — ED NOTES
Attempt made with case management to contact pt post discharge to inquire about follow up care. Unable to reach pt and unable to leave VM.      Jaci Kim, RN  12/27/24 6437

## 2024-12-27 NOTE — PROGRESS NOTES
Urologic Oncology  [facility]  [address] [place] [id number]  [phone]          Maria Alejandra Castillo  : 1945      INITIAL EVALUATION    Chief Complaint   Patient presents with    New Patient       HPI: Maria Alejandra Castillo is a 79 y.o. female with urothelial carcinoma of the bladder.    Patient is from [place] and lives in [place].  She is here today with her brother who is from out of town, and her son and daughter-in-law who live near her.    She was referred by Dr. Lacey.    She initially presented with gross hematuria that led to a CT scan on 2024 which showed moderate right hydronephrosis with a large mass in her bladder that measured almost 3-1/2 cm.  She was taken for TURBT on 2024 and her pathology was consistent with high-grade muscle invasive urothelial cancer of the bladder.  Dr. Lacey was not able to find the right UO in obviously could not place a stent.    She had a recent chest CT that was negative for metastatic disease.  Her previous CT of her abdomen and pelvis did not show any lymphadenopathy.    Her creatinine today is stable at 1.2.  She does not have any flank pain.  She denies any ongoing hematuria.    She has multiple comorbidities including diabetes COPD and peripheral vascular disease as well as hypertension.  She also has a history of localized vulvar cancer that was excised by Dr. Lamb.    She had a carotid endarterectomy previously.  Other past surgical history significant for an abdominal hysterectomy and an appendectomy.  She is not on any anticoagulants.    PMH:     Past Medical History:   Diagnosis Date    Anemia     oral Fe-- denies hx of blood transfusions    Anxiety     hx of anxiety    Cancer (HCC)  dx 2014    vulvar cancer- surgical removal-  \"contained\" - no further treatment    Class 1 obesity with body mass index (BMI) of 32.0 to 32.9 in adult 2018    COPD (chronic obstructive pulmonary disease) (HCC)

## 2024-12-30 ENCOUNTER — TELEPHONE (OUTPATIENT)
Dept: ONCOLOGY | Age: 79
End: 2024-12-30

## 2024-12-30 NOTE — TELEPHONE ENCOUNTER
Physician provider: Dr. Lipscomb  Reason for today's call (Please detail here patients chief complaint): needs diagnosis notes  Last office visit:na  Patient Callback Number: [phone]  Was callback number verified?: Yes  Preferred pharmacy (If refill request): na  Calls to office within the last 48 hours?:No    Patient notified that their information will be routed to the [facility] clinical triage team for review. Patient is advised that they will receive a phone call from the triage department. If symptom related and symptoms worsen before receiving a call back, the patient has been advised to proceed to the nearest ED.     Patients son [email or web address] or  [email or web address]  Patient needs an official diagnosis note for [employer] policy.

## 2025-01-02 ENCOUNTER — TELEPHONE (OUTPATIENT)
Dept: ONCOLOGY | Age: 80
End: 2025-01-02

## 2025-01-02 RX ORDER — OXYBUTYNIN CHLORIDE 5 MG/1
5 TABLET ORAL 3 TIMES DAILY PRN
Qty: 30 TABLET | Refills: 1 | Status: SHIPPED | OUTPATIENT
Start: 2025-01-02

## 2025-01-02 NOTE — TELEPHONE ENCOUNTER
Subjective    Diagnosis: Bladder Cancer    Chief Complaint (Brief): Bloody urine, incontinent, decreased po intake.  Initial Onset: x1 week.  Has progressively worsened in one week.    Medication List Reviewed with patient and accuracy verified: Yes  Patient taking medications as prescribed: Yes  Difficulty Breathing/ Lower Extremity Swelling: Yes.  SOB with exertion  New Onset Altered Mental Status: No  Last oral temperature and time, if known:na  Pain Score(0-10):   Description of pain (location and characteristics): na  Fatigue Score (0-10):10  Any Identifiable Triggers to Complaint: na      Objective    Date of last Office Visit: 12/27/24   Treatment type, if applicable:   Date of last treatment, if applicable:na  Recent surgery (include date,if applicable): 12/23/24 TURBT  State of Incision: na        Plan/Intervention    Initial Action Plan: RN to Provider Consultation  Patient verbalized understanding of plan: YES/NO: Yes  Patient voiced intended compliance with plan: Verbalized/ Refused: Verbalized intended compliance    Resolution:    Final Plan: Pending

## 2025-01-02 NOTE — TELEPHONE ENCOUNTER
I returned patient's call regarding pelvic pain and gross hematuria following TURBT on 12/23/2024.  She states that her pain has been getting progressively worse over the last week.  She describes this as pain across her lower abdomen bilaterally.  She also endorses urinary incontinence.  Denies any flank pain.  No fevers.  She also endorses gross hematuria.  She states that she has decreased p.o. intake of food and fluids over the past week as well.  I discussed that gross hematuria certainly can be expected following her TURBT and that this should continue to improve.  I encouraged her to increase her oral hydration.  I have sent her prescription for oxybutynin 5 mg p.o. 3 times daily as needed to help with her pain.  I stressed that a possible side effect this medication is actually urinary retention and discussed with her that should she be unable to void while taking the medication she should present to the ED.  She understands to call if her symptoms do not improve with above and a urinalysis can be checked to rule out infection though she currently denies dysuria.  She expressed understanding to the above and thanked me for the call.

## 2025-01-02 NOTE — TELEPHONE ENCOUNTER
Physician provider: Dr. Lipscomb  Reason for today's call (Please detail here patients chief complaint): hematuria  Last office visit:n/a  Patient Callback Number: [phone] or [phone]  Was callback number verified?: No  Preferred pharmacy (If refill request): n/a  Calls to office within the last 48 hours?:No    Patient notified that their information will be routed to the [facility] clinical triage team for review. Patient is advised that they will receive a phone call from the triage department. If symptom related and symptoms worsen before receiving a call back, the patient has been advised to proceed to the nearest ED.          Pt's son stated pt has blood in urine, and extreme amount of pain. Pt has pain across her abdomen. Pain level 10-12  Pt has blockage in ureter.    Pt's son would like a direct admit    [phone]

## 2025-01-03 ENCOUNTER — HOSPITAL ENCOUNTER (EMERGENCY)
Age: 80
Discharge: HOME | End: 2025-01-03
Attending: EMERGENCY MEDICINE
Payer: MEDICARE

## 2025-01-03 VITALS
RESPIRATION RATE: 15 BRPM | SYSTOLIC BLOOD PRESSURE: 110 MMHG | TEMPERATURE: 98.4 F | DIASTOLIC BLOOD PRESSURE: 51 MMHG | HEART RATE: 99 BPM | OXYGEN SATURATION: 100 %

## 2025-01-03 DIAGNOSIS — R33.9 URINARY RETENTION: Primary | ICD-10-CM

## 2025-01-03 PROCEDURE — 51703 INSERT BLADDER CATH COMPLEX: CPT

## 2025-01-03 PROCEDURE — 51702 INSERT TEMP BLADDER CATH: CPT

## 2025-01-03 PROCEDURE — 99283 EMERGENCY DEPT VISIT LOW MDM: CPT

## 2025-01-03 ASSESSMENT — PAIN SCALES - GENERAL: PAINLEVEL_OUTOF10: 8

## 2025-01-03 ASSESSMENT — PAIN DESCRIPTION - LOCATION: LOCATION: ABDOMEN

## 2025-01-03 ASSESSMENT — PAIN DESCRIPTION - ORIENTATION: ORIENTATION: LOWER

## 2025-01-03 ASSESSMENT — PAIN DESCRIPTION - DESCRIPTORS: DESCRIPTORS: DISCOMFORT

## 2025-01-03 NOTE — ED PROVIDER NOTES
Emergency Department Provider Note       PCP: Elva Izaguirre DO   Age: 79 y.o.   Sex: female     DISPOSITION Decision To Discharge 01/03/2025 01:53:21 PM   DISPOSITION CONDITION Stable            ICD-10-CM    1. Urinary retention  R33.9           Medical Decision Making     With increasing suprapubic pain, urinary retention, indwelling Serna catheter was placed.  Moderate amount of urine output that was grossly bloody noted.  Patient endorsed significant improvement in her symptoms.  At this time, Serna catheter appears to be functioning appropriately.  She is asymptomatic.  She already has close follow-up with urology in the coming days.  Family comfortable with follow-up in the outpatient setting.  Strict return precautions verbally discussed.  I did consider laboratory testing however she had some recent studies performed.  Her creatinine had been stable.  She has close follow-up already scheduled.     1 acute, uncomplicated illness or injury.  Patient was discharged risks and benefits of hospitalization were considered.  Shared medical decision making was utilized in creating the patients health plan today.    I independently ordered and reviewed each unique test.  I reviewed external records: ED visit note from a different ED.   I reviewed external records: provider visit note from PCP.  I reviewed external records: provider visit note from outside specialist.                   History     Patient presents to the emergency department with chief complaint of urinary retention.  Patient currently under the care of urology and oncology regarding bladder cancer.  He is currently scheduled to have stent placed later in the month.  Has been having increasing suprapubic pain.  Was placed on oxybutynin over the last several days and since has had worsening urinary retention.  Presents with severe suprapubic pain and inability to urinate.    The history is provided by the patient.     Physical Exam     Vitals  Running Out of Food in the Last Year: Never true     Ran Out of Food in the Last Year: Never true   Transportation Needs: No Transportation Needs (12/23/2024)    PRAPARE - Transportation     Lack of Transportation (Medical): No     Lack of Transportation (Non-Medical): No   Physical Activity: Inactive (10/3/2022)    Exercise Vital Sign     Days of Exercise per Week: 0 days     Minutes of Exercise per Session: 0 min   Social Connections: Unknown (3/20/2021)    Received from el?    Social Connections     Frequency of Communication with Friends and Family: Not asked     Frequency of Social Gatherings with Friends and Family: Not asked   Intimate Partner Violence: Unknown (3/20/2021)    Received from el?    Intimate Partner Violence     Fear of Current or Ex-Partner: Not asked     Emotionally Abused: Not asked     Physically Abused: Not asked     Sexually Abused: Not asked   Housing Stability: Low Risk  (12/23/2024)    Housing Stability Vital Sign     Unable to Pay for Housing in the Last Year: No     Number of Times Moved in the Last Year: 0     Homeless in the Last Year: No        Previous Medications    ALBUTEROL SULFATE HFA (PROVENTIL;VENTOLIN;PROAIR) 108 (90 BASE) MCG/ACT INHALER    Inhale 1 puff into the lungs every 6 hours as needed for Wheezing or Shortness of Breath    AMLODIPINE (NORVASC) 5 MG TABLET    Take 1 tablet by mouth daily    ASCORBIC ACID (VITAMIN C) 250 MG TABLET    Take 1 tablet by mouth daily    ASPIRIN 81 MG EC TABLET    Take 1 tablet by mouth daily    CONTINUOUS BLOOD GLUC  (DEXCOM G6 ) NICKY    For uncontrolled DM Type 2 insulin requiring.    CONTINUOUS BLOOD GLUC  (FREESTYLE LES 2 READER) NICKY    For diabetes Type 2 uncontrolled glucose monitoring.    CONTINUOUS BLOOD GLUC SENSOR (DEXCOM G6 SENSOR) MISC    For uncontrolled Diabetes Type 2 insulin requiring.    CONTINUOUS BLOOD GLUC SENSOR (FREESTYLE LES 2 SENSOR) MISC

## 2025-01-03 NOTE — ED TRIAGE NOTES
Pt arrives via [facility] from home for complaint of urinary retention. Pt with bladder cancer and an obstructed ureter.

## 2025-01-07 ENCOUNTER — PATIENT MESSAGE (OUTPATIENT)
Dept: ONCOLOGY | Age: 80
End: 2025-01-07

## 2025-01-10 NOTE — PRE-PROCEDURE INSTRUCTIONS
Patient verified name and .  Order for consent  was found in EHR and does match case posting; patient verifies procedure.   Type lB surgery, phone assessment completed with patient and son.  Orders were received.  Labs per surgeon: none at present time   Labs per anesthesia protocol: poc glucose and potassium results of 3.3 in care everywhere from 2025    Patient answered medical/surgical history questions at their best of ability. All prior to admission medications documented in EPIC.    Patient instructed to continue taking all prescription medications up to the day of surgery but to take only the following medications the day of surgery according to anesthesia guidelines with a small sip of water: Patient and son not able to verify pta medications. Patient's son states \"he is upset\" about patients' care. Instructed son to hold all meds am of procedure including novolog insulin. Son verbalized understanding    Also, patient is requested to take 2 Tylenol in the morning and then again before bed on the day before surgery. Regular or extra strength may be used.       Please drink 32 ounces of non-caffeinated clear liquids 2 hours prior to your arrival to avoid dehydration.     Patient informed that all vitamins and supplements should be held 7 days prior to surgery and NSAIDS 5 days prior to surgery.     Patient instructed on the following:    > Arrive at [facility] MAIN Entrance, time of arrival to be called the day before by 1700  > NPO after midnight, unless otherwise indicated, including gum, mints, and ice chips  > Responsible adult must drive patient to the hospital, stay during surgery, and patient will need supervision 24 hours after anesthesia  > Use non moisturizing soap in shower the night before surgery and on the morning of surgery  > All piercings must be removed prior to arrival.    > Leave all valuables (money and jewelry) at home but bring insurance card and ID on DOS.   > You may be required to

## 2025-01-13 ENCOUNTER — ANESTHESIA EVENT (OUTPATIENT)
Dept: SURGERY | Age: 80
End: 2025-01-13
Payer: MEDICARE

## 2025-01-14 ENCOUNTER — APPOINTMENT (OUTPATIENT)
Dept: GENERAL RADIOLOGY | Age: 80
DRG: 687 | End: 2025-01-14
Attending: UROLOGY
Payer: MEDICARE

## 2025-01-14 ENCOUNTER — HOSPITAL ENCOUNTER (INPATIENT)
Age: 80
LOS: 2 days | Discharge: HOME | DRG: 687 | End: 2025-01-16
Attending: UROLOGY | Admitting: UROLOGY
Payer: MEDICARE

## 2025-01-14 ENCOUNTER — ANESTHESIA (OUTPATIENT)
Dept: SURGERY | Age: 80
End: 2025-01-14
Payer: MEDICARE

## 2025-01-14 DIAGNOSIS — C67.9 UROTHELIAL CARCINOMA OF BLADDER WITH INVASION OF MUSCLE (HCC): Primary | ICD-10-CM

## 2025-01-14 LAB
GLUCOSE BLD STRIP.AUTO-MCNC: 219 MG/DL (ref 65–100)
GLUCOSE BLD STRIP.AUTO-MCNC: 241 MG/DL (ref 65–100)
GLUCOSE BLD STRIP.AUTO-MCNC: 277 MG/DL (ref 65–100)
GLUCOSE BLD STRIP.AUTO-MCNC: 286 MG/DL (ref 65–100)
SERVICE CMNT-IMP: ABNORMAL

## 2025-01-14 PROCEDURE — 7100000001 HC PACU RECOVERY - ADDTL 15 MIN: Performed by: UROLOGY

## 2025-01-14 PROCEDURE — 6370000000 HC RX 637 (ALT 250 FOR IP): Performed by: ANESTHESIOLOGY

## 2025-01-14 PROCEDURE — C1758 CATHETER, URETERAL: HCPCS | Performed by: UROLOGY

## 2025-01-14 PROCEDURE — 82962 GLUCOSE BLOOD TEST: CPT

## 2025-01-14 PROCEDURE — 2500000003 HC RX 250 WO HCPCS: Performed by: UROLOGY

## 2025-01-14 PROCEDURE — 6360000002 HC RX W HCPCS: Performed by: UROLOGY

## 2025-01-14 PROCEDURE — 2720000010 HC SURG SUPPLY STERILE: Performed by: UROLOGY

## 2025-01-14 PROCEDURE — 6360000002 HC RX W HCPCS

## 2025-01-14 PROCEDURE — 94640 AIRWAY INHALATION TREATMENT: CPT

## 2025-01-14 PROCEDURE — 3700000001 HC ADD 15 MINUTES (ANESTHESIA): Performed by: UROLOGY

## 2025-01-14 PROCEDURE — 0TCB8ZZ EXTIRPATION OF MATTER FROM BLADDER, VIA NATURAL OR ARTIFICIAL OPENING ENDOSCOPIC: ICD-10-PCS | Performed by: UROLOGY

## 2025-01-14 PROCEDURE — 87088 URINE BACTERIA CULTURE: CPT

## 2025-01-14 PROCEDURE — 87086 URINE CULTURE/COLONY COUNT: CPT

## 2025-01-14 PROCEDURE — 6370000000 HC RX 637 (ALT 250 FOR IP): Performed by: UROLOGY

## 2025-01-14 PROCEDURE — 7100000000 HC PACU RECOVERY - FIRST 15 MIN: Performed by: UROLOGY

## 2025-01-14 PROCEDURE — 3700000000 HC ANESTHESIA ATTENDED CARE: Performed by: UROLOGY

## 2025-01-14 PROCEDURE — 6370000000 HC RX 637 (ALT 250 FOR IP): Performed by: STUDENT IN AN ORGANIZED HEALTH CARE EDUCATION/TRAINING PROGRAM

## 2025-01-14 PROCEDURE — 87186 SC STD MICRODIL/AGAR DIL: CPT

## 2025-01-14 PROCEDURE — 6360000002 HC RX W HCPCS: Performed by: PHYSICIAN ASSISTANT

## 2025-01-14 PROCEDURE — 3600000014 HC SURGERY LEVEL 4 ADDTL 15MIN: Performed by: UROLOGY

## 2025-01-14 PROCEDURE — 2709999900 HC NON-CHARGEABLE SUPPLY: Performed by: UROLOGY

## 2025-01-14 PROCEDURE — 2500000003 HC RX 250 WO HCPCS

## 2025-01-14 PROCEDURE — 2580000003 HC RX 258: Performed by: UROLOGY

## 2025-01-14 PROCEDURE — 1100000000 HC RM PRIVATE

## 2025-01-14 PROCEDURE — 94760 N-INVAS EAR/PLS OXIMETRY 1: CPT

## 2025-01-14 PROCEDURE — 3600000004 HC SURGERY LEVEL 4 BASE: Performed by: UROLOGY

## 2025-01-14 PROCEDURE — 2580000003 HC RX 258

## 2025-01-14 PROCEDURE — 52001 CYSTO W/IRRG&EVAC MLT CLOTS: CPT | Performed by: UROLOGY

## 2025-01-14 RX ORDER — SODIUM CHLORIDE 0.9 % (FLUSH) 0.9 %
5-40 SYRINGE (ML) INJECTION PRN
Status: DISCONTINUED | OUTPATIENT
Start: 2025-01-14 | End: 2025-01-16 | Stop reason: HOSPADM

## 2025-01-14 RX ORDER — FAMOTIDINE 20 MG/1
20 TABLET, FILM COATED ORAL DAILY PRN
Status: DISCONTINUED | OUTPATIENT
Start: 2025-01-14 | End: 2025-01-16 | Stop reason: HOSPADM

## 2025-01-14 RX ORDER — ONDANSETRON 2 MG/ML
INJECTION INTRAMUSCULAR; INTRAVENOUS
Status: DISCONTINUED | OUTPATIENT
Start: 2025-01-14 | End: 2025-01-14 | Stop reason: SDUPTHER

## 2025-01-14 RX ORDER — MIDAZOLAM HYDROCHLORIDE 2 MG/2ML
2 INJECTION, SOLUTION INTRAMUSCULAR; INTRAVENOUS
Status: DISCONTINUED | OUTPATIENT
Start: 2025-01-14 | End: 2025-01-14 | Stop reason: HOSPADM

## 2025-01-14 RX ORDER — DEXMEDETOMIDINE HYDROCHLORIDE 100 UG/ML
INJECTION, SOLUTION INTRAVENOUS
Status: DISCONTINUED | OUTPATIENT
Start: 2025-01-14 | End: 2025-01-14 | Stop reason: SDUPTHER

## 2025-01-14 RX ORDER — SODIUM CHLORIDE 0.9 % (FLUSH) 0.9 %
5-40 SYRINGE (ML) INJECTION EVERY 12 HOURS SCHEDULED
Status: DISCONTINUED | OUTPATIENT
Start: 2025-01-14 | End: 2025-01-16 | Stop reason: HOSPADM

## 2025-01-14 RX ORDER — SODIUM CHLORIDE 0.9 % (FLUSH) 0.9 %
5-40 SYRINGE (ML) INJECTION PRN
Status: DISCONTINUED | OUTPATIENT
Start: 2025-01-14 | End: 2025-01-14 | Stop reason: HOSPADM

## 2025-01-14 RX ORDER — HALOPERIDOL 5 MG/ML
1 INJECTION INTRAMUSCULAR
Status: DISCONTINUED | OUTPATIENT
Start: 2025-01-14 | End: 2025-01-14 | Stop reason: HOSPADM

## 2025-01-14 RX ORDER — FENTANYL CITRATE 50 UG/ML
100 INJECTION, SOLUTION INTRAMUSCULAR; INTRAVENOUS
Status: DISCONTINUED | OUTPATIENT
Start: 2025-01-14 | End: 2025-01-14 | Stop reason: HOSPADM

## 2025-01-14 RX ORDER — FENTANYL CITRATE 50 UG/ML
INJECTION, SOLUTION INTRAMUSCULAR; INTRAVENOUS
Status: DISCONTINUED | OUTPATIENT
Start: 2025-01-14 | End: 2025-01-14 | Stop reason: SDUPTHER

## 2025-01-14 RX ORDER — DEXTROSE MONOHYDRATE 100 MG/ML
INJECTION, SOLUTION INTRAVENOUS CONTINUOUS PRN
Status: DISCONTINUED | OUTPATIENT
Start: 2025-01-14 | End: 2025-01-16 | Stop reason: HOSPADM

## 2025-01-14 RX ORDER — ONDANSETRON 2 MG/ML
4 INJECTION INTRAMUSCULAR; INTRAVENOUS EVERY 4 HOURS PRN
Status: DISCONTINUED | OUTPATIENT
Start: 2025-01-14 | End: 2025-01-16 | Stop reason: HOSPADM

## 2025-01-14 RX ORDER — GLYCOPYRROLATE 0.2 MG/ML
INJECTION INTRAMUSCULAR; INTRAVENOUS
Status: DISCONTINUED | OUTPATIENT
Start: 2025-01-14 | End: 2025-01-14 | Stop reason: SDUPTHER

## 2025-01-14 RX ORDER — LIDOCAINE HYDROCHLORIDE 10 MG/ML
1 INJECTION, SOLUTION INFILTRATION; PERINEURAL
Status: DISCONTINUED | OUTPATIENT
Start: 2025-01-14 | End: 2025-01-14 | Stop reason: HOSPADM

## 2025-01-14 RX ORDER — SODIUM CHLORIDE 0.9 % (FLUSH) 0.9 %
5-40 SYRINGE (ML) INJECTION EVERY 12 HOURS SCHEDULED
Status: DISCONTINUED | OUTPATIENT
Start: 2025-01-14 | End: 2025-01-14 | Stop reason: HOSPADM

## 2025-01-14 RX ORDER — INSULIN LISPRO 100 [IU]/ML
0-8 INJECTION, SOLUTION INTRAVENOUS; SUBCUTANEOUS
Status: DISCONTINUED | OUTPATIENT
Start: 2025-01-14 | End: 2025-01-16 | Stop reason: HOSPADM

## 2025-01-14 RX ORDER — INSULIN LISPRO 100 [IU]/ML
3 INJECTION, SOLUTION INTRAVENOUS; SUBCUTANEOUS ONCE
Status: COMPLETED | OUTPATIENT
Start: 2025-01-14 | End: 2025-01-14

## 2025-01-14 RX ORDER — IBUPROFEN 600 MG/1
1 TABLET ORAL PRN
Status: DISCONTINUED | OUTPATIENT
Start: 2025-01-14 | End: 2025-01-16 | Stop reason: HOSPADM

## 2025-01-14 RX ORDER — FLUCONAZOLE 100 MG/1
100 TABLET ORAL DAILY
Status: DISCONTINUED | OUTPATIENT
Start: 2025-01-15 | End: 2025-01-15

## 2025-01-14 RX ORDER — SODIUM CHLORIDE 9 MG/ML
INJECTION, SOLUTION INTRAVENOUS
Status: DISCONTINUED | OUTPATIENT
Start: 2025-01-14 | End: 2025-01-14 | Stop reason: SDUPTHER

## 2025-01-14 RX ORDER — ASPIRIN 81 MG/1
81 TABLET ORAL DAILY
Status: DISCONTINUED | OUTPATIENT
Start: 2025-01-14 | End: 2025-01-16 | Stop reason: HOSPADM

## 2025-01-14 RX ORDER — OXYCODONE HYDROCHLORIDE 5 MG/1
5 TABLET ORAL EVERY 4 HOURS PRN
Status: DISCONTINUED | OUTPATIENT
Start: 2025-01-14 | End: 2025-01-16 | Stop reason: HOSPADM

## 2025-01-14 RX ORDER — LEVOFLOXACIN 5 MG/ML
500 INJECTION, SOLUTION INTRAVENOUS
Status: COMPLETED | OUTPATIENT
Start: 2025-01-14 | End: 2025-01-14

## 2025-01-14 RX ORDER — SENNA AND DOCUSATE SODIUM 50; 8.6 MG/1; MG/1
1 TABLET, FILM COATED ORAL 2 TIMES DAILY
Status: DISCONTINUED | OUTPATIENT
Start: 2025-01-14 | End: 2025-01-16 | Stop reason: HOSPADM

## 2025-01-14 RX ORDER — NEOSTIGMINE METHYLSULFATE 1 MG/ML
INJECTION, SOLUTION INTRAVENOUS
Status: DISCONTINUED | OUTPATIENT
Start: 2025-01-14 | End: 2025-01-14 | Stop reason: SDUPTHER

## 2025-01-14 RX ORDER — LIDOCAINE HYDROCHLORIDE 20 MG/ML
INJECTION, SOLUTION EPIDURAL; INFILTRATION; INTRACAUDAL; PERINEURAL
Status: DISCONTINUED | OUTPATIENT
Start: 2025-01-14 | End: 2025-01-14 | Stop reason: SDUPTHER

## 2025-01-14 RX ORDER — LISINOPRIL 20 MG/1
40 TABLET ORAL DAILY
Status: DISCONTINUED | OUTPATIENT
Start: 2025-01-14 | End: 2025-01-16 | Stop reason: HOSPADM

## 2025-01-14 RX ORDER — TRIAMTERENE AND HYDROCHLOROTHIAZIDE 37.5; 25 MG/1; MG/1
0.5 TABLET ORAL DAILY
Status: DISCONTINUED | OUTPATIENT
Start: 2025-01-14 | End: 2025-01-16 | Stop reason: HOSPADM

## 2025-01-14 RX ORDER — PROPOFOL 10 MG/ML
INJECTION, EMULSION INTRAVENOUS
Status: DISCONTINUED | OUTPATIENT
Start: 2025-01-14 | End: 2025-01-14 | Stop reason: SDUPTHER

## 2025-01-14 RX ORDER — HYDROMORPHONE HYDROCHLORIDE 1 MG/ML
0.5 INJECTION, SOLUTION INTRAMUSCULAR; INTRAVENOUS; SUBCUTANEOUS EVERY 4 HOURS PRN
Status: DISCONTINUED | OUTPATIENT
Start: 2025-01-14 | End: 2025-01-16 | Stop reason: HOSPADM

## 2025-01-14 RX ORDER — SODIUM CHLORIDE, SODIUM LACTATE, POTASSIUM CHLORIDE, CALCIUM CHLORIDE 600; 310; 30; 20 MG/100ML; MG/100ML; MG/100ML; MG/100ML
INJECTION, SOLUTION INTRAVENOUS CONTINUOUS
Status: DISCONTINUED | OUTPATIENT
Start: 2025-01-14 | End: 2025-01-14 | Stop reason: HOSPADM

## 2025-01-14 RX ORDER — PROCHLORPERAZINE EDISYLATE 5 MG/ML
5 INJECTION INTRAMUSCULAR; INTRAVENOUS
Status: DISCONTINUED | OUTPATIENT
Start: 2025-01-14 | End: 2025-01-14 | Stop reason: HOSPADM

## 2025-01-14 RX ORDER — ROCURONIUM BROMIDE 10 MG/ML
INJECTION, SOLUTION INTRAVENOUS
Status: DISCONTINUED | OUTPATIENT
Start: 2025-01-14 | End: 2025-01-14 | Stop reason: SDUPTHER

## 2025-01-14 RX ORDER — ALBUTEROL SULFATE 0.83 MG/ML
2.5 SOLUTION RESPIRATORY (INHALATION)
Status: DISCONTINUED | OUTPATIENT
Start: 2025-01-14 | End: 2025-01-16

## 2025-01-14 RX ORDER — NALOXONE HYDROCHLORIDE 0.4 MG/ML
INJECTION, SOLUTION INTRAMUSCULAR; INTRAVENOUS; SUBCUTANEOUS PRN
Status: DISCONTINUED | OUTPATIENT
Start: 2025-01-14 | End: 2025-01-14 | Stop reason: HOSPADM

## 2025-01-14 RX ORDER — AMLODIPINE BESYLATE 5 MG/1
5 TABLET ORAL DAILY
Status: DISCONTINUED | OUTPATIENT
Start: 2025-01-14 | End: 2025-01-16 | Stop reason: HOSPADM

## 2025-01-14 RX ORDER — INSULIN LISPRO 100 [IU]/ML
2 INJECTION, SOLUTION INTRAVENOUS; SUBCUTANEOUS ONCE
Status: COMPLETED | OUTPATIENT
Start: 2025-01-14 | End: 2025-01-14

## 2025-01-14 RX ORDER — SODIUM CHLORIDE 9 MG/ML
INJECTION, SOLUTION INTRAVENOUS PRN
Status: DISCONTINUED | OUTPATIENT
Start: 2025-01-14 | End: 2025-01-14 | Stop reason: HOSPADM

## 2025-01-14 RX ORDER — ACETAMINOPHEN 500 MG
1000 TABLET ORAL ONCE
Status: COMPLETED | OUTPATIENT
Start: 2025-01-14 | End: 2025-01-14

## 2025-01-14 RX ORDER — EPHEDRINE SULFATE 5 MG/ML
INJECTION INTRAVENOUS
Status: DISCONTINUED | OUTPATIENT
Start: 2025-01-14 | End: 2025-01-14 | Stop reason: SDUPTHER

## 2025-01-14 RX ORDER — OXYCODONE HYDROCHLORIDE 5 MG/1
5 TABLET ORAL
Status: DISCONTINUED | OUTPATIENT
Start: 2025-01-14 | End: 2025-01-14 | Stop reason: HOSPADM

## 2025-01-14 RX ORDER — SODIUM CHLORIDE, SODIUM LACTATE, POTASSIUM CHLORIDE, CALCIUM CHLORIDE 600; 310; 30; 20 MG/100ML; MG/100ML; MG/100ML; MG/100ML
INJECTION, SOLUTION INTRAVENOUS CONTINUOUS
Status: DISCONTINUED | OUTPATIENT
Start: 2025-01-14 | End: 2025-01-15

## 2025-01-14 RX ADMIN — EPHEDRINE SULFATE 5 MG: 5 INJECTION INTRAVENOUS at 14:06

## 2025-01-14 RX ADMIN — Medication 2 MG: at 14:44

## 2025-01-14 RX ADMIN — SODIUM CHLORIDE, PRESERVATIVE FREE 10 ML: 5 INJECTION INTRAVENOUS at 20:51

## 2025-01-14 RX ADMIN — ACETAMINOPHEN 1000 MG: 500 TABLET, FILM COATED ORAL at 13:24

## 2025-01-14 RX ADMIN — INSULIN LISPRO 2 UNITS: 100 INJECTION, SOLUTION INTRAVENOUS; SUBCUTANEOUS at 13:30

## 2025-01-14 RX ADMIN — FENTANYL CITRATE 50 MCG: 50 INJECTION, SOLUTION INTRAMUSCULAR; INTRAVENOUS at 13:48

## 2025-01-14 RX ADMIN — SODIUM CHLORIDE, POTASSIUM CHLORIDE, SODIUM LACTATE AND CALCIUM CHLORIDE: 600; 310; 30; 20 INJECTION, SOLUTION INTRAVENOUS at 15:52

## 2025-01-14 RX ADMIN — FLUCONAZOLE 100 MG: 200 INJECTION, SOLUTION INTRAVENOUS at 14:53

## 2025-01-14 RX ADMIN — INSULIN LISPRO 2 UNITS: 100 INJECTION, SOLUTION INTRAVENOUS; SUBCUTANEOUS at 17:13

## 2025-01-14 RX ADMIN — DOCUSATE SODIUM 50 MG AND SENNOSIDES 8.6 MG 1 TABLET: 8.6; 5 TABLET, FILM COATED ORAL at 20:52

## 2025-01-14 RX ADMIN — PHENYLEPHRINE HYDROCHLORIDE 100 MCG: 10 INJECTION INTRAVENOUS at 13:58

## 2025-01-14 RX ADMIN — ROCURONIUM BROMIDE 30 MG: 10 INJECTION, SOLUTION INTRAVENOUS at 13:48

## 2025-01-14 RX ADMIN — ASPIRIN 81 MG: 81 TABLET, COATED ORAL at 17:13

## 2025-01-14 RX ADMIN — PHENYLEPHRINE HYDROCHLORIDE 100 MCG: 10 INJECTION INTRAVENOUS at 14:06

## 2025-01-14 RX ADMIN — LISINOPRIL 40 MG: 20 TABLET ORAL at 17:13

## 2025-01-14 RX ADMIN — FENTANYL CITRATE 50 MCG: 50 INJECTION, SOLUTION INTRAMUSCULAR; INTRAVENOUS at 14:17

## 2025-01-14 RX ADMIN — INSULIN LISPRO 2 UNITS: 100 INJECTION, SOLUTION INTRAVENOUS; SUBCUTANEOUS at 20:58

## 2025-01-14 RX ADMIN — GLYCOPYRROLATE 0.4 MG: 0.2 INJECTION INTRAMUSCULAR; INTRAVENOUS at 14:44

## 2025-01-14 RX ADMIN — DEXMEDETOMIDINE 4 MCG: 100 INJECTION, SOLUTION, CONCENTRATE INTRAVENOUS at 14:47

## 2025-01-14 RX ADMIN — PROPOFOL 120 MG: 10 INJECTION, EMULSION INTRAVENOUS at 13:48

## 2025-01-14 RX ADMIN — ALBUTEROL SULFATE 2.5 MG: 2.5 SOLUTION RESPIRATORY (INHALATION) at 19:32

## 2025-01-14 RX ADMIN — LIDOCAINE HYDROCHLORIDE 80 MG: 20 INJECTION, SOLUTION EPIDURAL; INFILTRATION; INTRACAUDAL; PERINEURAL at 13:48

## 2025-01-14 RX ADMIN — LEVOFLOXACIN 500 MG: 5 INJECTION, SOLUTION INTRAVENOUS at 13:54

## 2025-01-14 RX ADMIN — PHENYLEPHRINE HYDROCHLORIDE 100 MCG: 10 INJECTION INTRAVENOUS at 14:01

## 2025-01-14 RX ADMIN — ONDANSETRON 4 MG: 2 INJECTION INTRAMUSCULAR; INTRAVENOUS at 14:45

## 2025-01-14 RX ADMIN — SODIUM CHLORIDE: 900 INJECTION INTRAVENOUS at 14:24

## 2025-01-14 RX ADMIN — AMLODIPINE BESYLATE 5 MG: 5 TABLET ORAL at 17:12

## 2025-01-14 RX ADMIN — INSULIN LISPRO 3 UNITS: 100 INJECTION, SOLUTION INTRAVENOUS; SUBCUTANEOUS at 15:26

## 2025-01-14 ASSESSMENT — LIFESTYLE VARIABLES: SMOKING_STATUS: 1

## 2025-01-14 ASSESSMENT — PAIN SCALES - GENERAL: PAINLEVEL_OUTOF10: 0

## 2025-01-14 NOTE — BRIEF OP NOTE
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    Post-Op Diagnosis: Same with possible yeast UTI       Procedure(s):  CYSTOSCOPY CLOT EVACUATION    Surgeon(s):  Kyler Lipscomb MD    Assistant:  * No surgical staff found *    Anesthesia: General    Estimated Blood Loss (mL): Minimal    Complications: None    Specimens:   ID Type Source Tests Collected by Time Destination   1 : urine culture Urine Urine, Cystoscopic CULTURE, URINE Kyler Lipscomb MD 1/14/2025 1414        Implants:  * No implants in log *      Drains:   Urinary Catheter 01/14/25 2 Way (Active)       [REMOVED] Urinary Catheter 12/23/24 2 Way (Removed)   $ Urethral catheter insertion $ Not inserted for procedure 12/23/24 0740   Catheter Indications Urinary retention (acute or chronic), continuous bladder irrigation or bladder outlet obstruction 12/24/24 0754   Site Assessment No urethral drainage 12/24/24 0754   Urine Color Pink;Bloody 12/24/24 0754   Urine Appearance Red flecks 12/24/24 0754   Urine Odor Other (Comment) 12/24/24 0754   Collection Container Standard 12/24/24 0754   Securement Method Securing device (Describe) 12/24/24 0754   Catheter Care  Perineal wipes 12/24/24 0754   Catheter Best Practices  Drainage tube clipped to bed;Catheter secured to thigh;Tamper seal intact;Bag below bladder;Bag not on floor;Lack of dependent loop in tubing;Drainage bag less than half full 12/24/24 0754   Status Draining;Patent 12/24/24 0754   Output (mL) 750 mL 12/24/24 0720       [REMOVED] Urinary Catheter 01/03/25 3 Way (Removed)       [REMOVED] External Urinary Catheter (Removed)   Site Assessment Clean,dry & intact 12/23/24 0210   Placement Initiated 12/23/24 0210   Catheter Care Catheter/Wick replaced;Suction Canister/Tubing changed 12/23/24 0210   Perineal Care Yes 12/23/24 0210

## 2025-01-14 NOTE — ANESTHESIA POSTPROCEDURE EVALUATION
Department of Anesthesiology  Postprocedure Note    Patient: Maria Alejandra Castillo  MRN: [id number]  YOB: 1945  Date of evaluation: 1/14/2025    Procedure Summary       Date: 01/14/25 Room / Location: [facility] MAIN OR 02 / [facility] MAIN OR    Anesthesia Start: 1339 Anesthesia Stop: 1457    Procedure: CYSTOSCOPY CLOT EVACUATION (Bladder) Diagnosis:       Urothelial carcinoma (HCC)      (Urothelial carcinoma (HCC) [C68.9])    Providers: Kyler Lipscomb MD Responsible Provider: Britton Shaw MD    Anesthesia Type: general ASA Status: 3            Anesthesia Type: No value filed.    Juan Antonio Phase I: Juan Antonio Score: 10    Juan Antonio Phase II:      Anesthesia Post Evaluation    Patient location during evaluation: PACU  Patient participation: complete - patient participated  Level of consciousness: awake  Airway patency: patent  Nausea & Vomiting: no nausea and no vomiting  Cardiovascular status: blood pressure returned to baseline  Respiratory status: acceptable  Hydration status: euvolemic  Pain management: adequate    No notable events documented.

## 2025-01-14 NOTE — PERIOP NOTE
TRANSFER - OUT REPORT:    Verbal report given to Juana GARDNER on Maria Alejandra Castillo  being transferred to [facility] for routine progression of patient care       Report consisted of patient’s Situation, Background, Assessment and   Recommendations(SBAR).     Information from the following report(s) Nurse Handoff Report, MAR, and Cardiac Rhythm NSR  was reviewed with the receiving nurse.    Lines:   Peripheral IV 01/14/25 Posterior;Right Forearm (Active)   Site Assessment Clean, dry & intact 01/14/25 1529   Line Status Infusing 01/14/25 1529   Line Care Connections checked and tightened 01/14/25 1529   Phlebitis Assessment No symptoms 01/14/25 1529   Infiltration Assessment 0 01/14/25 1529   Dressing Status Clean, dry & intact 01/14/25 1529   Dressing Type Transparent 01/14/25 1529        Opportunity for questions and clarification was provided.          Patient and family given floor number and nurses name.  Family updated re: pt status after security code verified.

## 2025-01-14 NOTE — PROGRESS NOTES
4 Eyes Skin Assessment     NAME:  Maria Alejandra Castillo  YOB: 1945  MEDICAL RECORD NUMBER:  [id number]    The patient is being assessed for  Admission    I agree that at least one RN has performed a thorough Head to Toe Skin Assessment on the patient. ALL assessment sites listed below have been assessed.      Areas assessed by both nurses:    Head, Face, Ears, Shoulders, Back, Chest, Arms, Elbows, Hands, Sacrum. Buttock, Coccyx, Ischium, Legs. Feet and Heels, and Under Medical Devices         Does the Patient have a Wound? No noted wound(s)       Apolinar Prevention initiated by RN: No  Wound Care Orders initiated by RN: No    Pressure Injury (Stage 3,4, Unstageable, DTI, NWPT, and Complex wounds) if present, place Wound referral order by RN under : No    New Ostomies, if present place, Ostomy referral order under : No     Nurse 1 eSignature: Electronically signed by Juana Almeida RN on 1/14/25 at 3:59 PM EST    **SHARE this note so that the co-signing nurse can place an eSignature**    Nurse 2 eSignature: Electronically signed by JOSEFA GAMBINO RN on 1/14/25 at 4:24 PM EST

## 2025-01-14 NOTE — ANESTHESIA PRE PROCEDURE
Saba Meza APRN - CNP   Lancets [facility] Sig: Test 2 times a day & as needed for symptoms of irregular blood glucose. Dispense sufficient amount for indicated testing frequency plus additional to accommodate PRN testing needs. 10/3/22   Saba Meza APRN - CNP       Current medications:    Current Facility-Administered Medications   Medication Dose Route Frequency Provider Last Rate Last Admin    lidocaine 1 % injection 1 mL  1 mL IntraDERmal Once PRN Alex Adam MD        acetaminophen (TYLENOL) tablet 1,000 mg  1,000 mg Oral Once Alex Adam MD        fentaNYL (SUBLIMAZE) injection 100 mcg  100 mcg IntraVENous Once PRN Alex Adam MD        lactated ringers infusion   IntraVENous Continuous Alex Adam MD        sodium chloride flush 0.9 % injection 5-40 mL  5-40 mL IntraVENous 2 times per day Alex Adam MD        sodium chloride flush 0.9 % injection 5-40 mL  5-40 mL IntraVENous PRN Alex Adam MD        0.9 % sodium chloride infusion   IntraVENous PRN Alex Adam MD        midazolam PF (VERSED) injection 2 mg  2 mg IntraVENous Once PRN Alex Adam MD        levoFLOXacin (LEVAQUIN) 500 MG/100ML infusion 500 mg  500 mg IntraVENous On Call to OR Kenzie Rosa PA           Allergies:  No Known Allergies    Problem List:    Patient Active Problem List   Diagnosis Code    Iron deficiency anemia D50.9    Anxiety F41.9    Hyperlipidemia E78.5    Diabetic polyneuropathy associated with type 2 diabetes mellitus (HCC) E11.42    Vitamin D deficiency E55.9    Peripheral vascular disease (HCC) I73.9    Vitamin B12 deficiency E53.8    Chronic obstructive pulmonary disease (HCC) J44.9    Left carotid stenosis I65.22    Overweight (BMI 25.0-29.9) E66.3    Smoking F17.200    Carotid stenosis, right I65.21    H/O carotid endarterectomy Z98.890    Obesity E66.9    Essential hypertension I10    Elevated liver enzymes R74.8    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

## 2025-01-14 NOTE — H&P
H&P Update:    The patient's last History and Physical was reviewed, and I examined the patient today.  There are no changes.  The surgical procedure and site were confirmed by the patient and me.    PE:  NAD  Heart- Reg, normal perfusion  Pulmonary- clear, normal respiratory effort  Abdomen- Soft, ND     Plan: The risks, benefits, and alternative treatment options were again discussed with the patient.  The patient understands and wants to proceed with the procedure-- TURBT with possible right ureteral stent.       Urologic Oncology  [facility]  [address] [place] [address]  [phone]          Maria Alejandra Castillo  : 1945      INITIAL EVALUATION    No chief complaint on file.      HPI: Maria Alejandra Castillo is a 79 y.o. female with urothelial carcinoma of the bladder.    Patient is from [place] [facility] [place] and lives in [place].  She is here today with her brother who is from out of town, and her son and daughter-in-law who live near her.    She was referred by Dr. Lacey.    She initially presented with gross hematuria that led to a CT scan on 2024 which showed moderate right hydronephrosis with a large mass in her bladder that measured almost 3-1/2 cm.  She was taken for TURBT on 2024 and her pathology was consistent with high-grade muscle invasive urothelial cancer of the bladder.  Dr. Lacey was not able to find the right UO in obviously could not place a stent.    She had a recent chest CT that was negative for metastatic disease.  Her previous CT of her abdomen and pelvis did not show any lymphadenopathy.    Her creatinine today is stable at 1.2.  She does not have any flank pain.  She denies any ongoing hematuria.    She has multiple comorbidities including diabetes COPD and peripheral vascular disease as well as hypertension.  She also has a history of localized vulvar cancer that was excised by Dr. Lamb.    She had a carotid endarterectomy previously.  Other

## 2025-01-14 NOTE — PROGRESS NOTES
TRANSFER - IN REPORT:    Verbal report received from Janice GARDNER on Maria Alejandra Castillo  being received from PACU for routine post-op      Report consisted of patient's Situation, Background, Assessment and   Recommendations(SBAR).     Information from the following report(s) Nurse Handoff Report was reviewed with the receiving nurse.    Opportunity for questions and clarification was provided.      Assessment to be completed upon patient's arrival to unit and then care will be assumed.

## 2025-01-14 NOTE — OP NOTE
[address]  [address]                            OPERATIVE REPORT      PATIENT NAME: ERASTO HILLMAN             : 1945  MED REC NO: [id number]                       ROOM: 611  ACCOUNT NO: [id number]                       ADMIT DATE: 2025  PROVIDER: Kyler Lipscomb MD    DATE OF SERVICE:  2025    PREOPERATIVE DIAGNOSES:  Muscle invasive urothelial cancer of the bladder with right hydronephrosis.    POSTOPERATIVE DIAGNOSES:  Muscle invasive urothelial cancer of the bladder with right hydronephrosis.    PROCEDURES PERFORMED:  Cystoscopy with evacuation of clot and likely fungus ball.    SURGEON:  Kyler Lipscomb MD    ASSISTANT:  Stefany Obando SA    ANESTHESIA:  General with endotracheal tube.    ESTIMATED BLOOD LOSS:  minimal    SPECIMENS REMOVED:  Urine for culture.    INTRAOPERATIVE FINDINGS:  After removal of the patient's Serna catheter, it was clear that her urine appeared purulent and extremely foul smelling.  I immediately was concerned for possible UTI and maybe even fungal UTI.  I carefully inserted cystoscope and she had what appeared to be a large amount of white fibrinous material throughout the upper portion of her bladder potentially consistent with fungus ball.  I made a decision I did not think it was safe to perform TURBT.  Instead, we evacuated clot and at least 30-40 mL of this white foul-smelling fibrinous material from the bladder.  I replaced her catheter.     COMPLICATIONS:  None.    IMPLANTS:  None.    INDICATIONS:  As below.    DESCRIPTION OF PROCEDURE:  After informed consent was obtained, she was taken to OR 2 in [facility].  After induction of general anesthesia and placement of endotracheal tube, she was carefully positioned in low lithotomy position.  Her Serna catheter was removed.  She was prepped and draped in usual sterile fashion.  A time-out was performed.  She  16:21:42  JOB #:  [id number]

## 2025-01-15 ENCOUNTER — APPOINTMENT (OUTPATIENT)
Dept: INTERVENTIONAL RADIOLOGY/VASCULAR | Age: 80
DRG: 687 | End: 2025-01-15
Attending: UROLOGY
Payer: MEDICARE

## 2025-01-15 LAB
ANION GAP SERPL CALC-SCNC: 10 MMOL/L (ref 7–16)
BUN SERPL-MCNC: 17 MG/DL (ref 8–23)
CALCIUM SERPL-MCNC: 8.4 MG/DL (ref 8.8–10.2)
CHLORIDE SERPL-SCNC: 103 MMOL/L (ref 98–107)
CO2 SERPL-SCNC: 25 MMOL/L (ref 20–29)
CREAT SERPL-MCNC: 1.12 MG/DL (ref 0.6–1.1)
ERYTHROCYTE [DISTWIDTH] IN BLOOD BY AUTOMATED COUNT: 19.5 % (ref 11.9–14.6)
GLUCOSE BLD STRIP.AUTO-MCNC: 148 MG/DL (ref 65–100)
GLUCOSE BLD STRIP.AUTO-MCNC: 189 MG/DL (ref 65–100)
GLUCOSE BLD STRIP.AUTO-MCNC: 257 MG/DL (ref 65–100)
GLUCOSE BLD STRIP.AUTO-MCNC: 257 MG/DL (ref 65–100)
GLUCOSE SERPL-MCNC: 183 MG/DL (ref 70–99)
HCT VFR BLD AUTO: 29.8 % (ref 35.8–46.3)
HGB BLD-MCNC: 9 G/DL (ref 11.7–15.4)
MCH RBC QN AUTO: 27.5 PG (ref 26.1–32.9)
MCHC RBC AUTO-ENTMCNC: 30.2 G/DL (ref 31.4–35)
MCV RBC AUTO: 91.1 FL (ref 82–102)
NRBC # BLD: 0 K/UL (ref 0–0.2)
PLATELET # BLD AUTO: 433 K/UL (ref 150–450)
PMV BLD AUTO: 11.3 FL (ref 9.4–12.3)
POTASSIUM SERPL-SCNC: 4.4 MMOL/L (ref 3.5–5.1)
RBC # BLD AUTO: 3.27 M/UL (ref 4.05–5.2)
SERVICE CMNT-IMP: ABNORMAL
SODIUM SERPL-SCNC: 138 MMOL/L (ref 136–145)
WBC # BLD AUTO: 10.2 K/UL (ref 4.3–11.1)

## 2025-01-15 PROCEDURE — 6360000004 HC RX CONTRAST MEDICATION: Performed by: RADIOLOGY

## 2025-01-15 PROCEDURE — 94640 AIRWAY INHALATION TREATMENT: CPT

## 2025-01-15 PROCEDURE — 0T9330Z DRAINAGE OF RIGHT KIDNEY PELVIS WITH DRAINAGE DEVICE, PERCUTANEOUS APPROACH: ICD-10-PCS | Performed by: RADIOLOGY

## 2025-01-15 PROCEDURE — 94761 N-INVAS EAR/PLS OXIMETRY MLT: CPT

## 2025-01-15 PROCEDURE — 85027 COMPLETE CBC AUTOMATED: CPT

## 2025-01-15 PROCEDURE — 2709999900 IR GUIDED NEPHROSTOMY CATH PLACEMENT RIGHT

## 2025-01-15 PROCEDURE — 6370000000 HC RX 637 (ALT 250 FOR IP): Performed by: UROLOGY

## 2025-01-15 PROCEDURE — 2580000003 HC RX 258: Performed by: UROLOGY

## 2025-01-15 PROCEDURE — 6360000002 HC RX W HCPCS: Performed by: UROLOGY

## 2025-01-15 PROCEDURE — 2500000003 HC RX 250 WO HCPCS: Performed by: UROLOGY

## 2025-01-15 PROCEDURE — 1100000000 HC RM PRIVATE

## 2025-01-15 PROCEDURE — 50432 PLMT NEPHROSTOMY CATHETER: CPT | Performed by: RADIOLOGY

## 2025-01-15 PROCEDURE — 2580000003 HC RX 258: Performed by: RADIOLOGY

## 2025-01-15 PROCEDURE — 82962 GLUCOSE BLOOD TEST: CPT

## 2025-01-15 PROCEDURE — 94760 N-INVAS EAR/PLS OXIMETRY 1: CPT

## 2025-01-15 PROCEDURE — 99024 POSTOP FOLLOW-UP VISIT: CPT | Performed by: PHYSICIAN ASSISTANT

## 2025-01-15 PROCEDURE — 99152 MOD SED SAME PHYS/QHP 5/>YRS: CPT

## 2025-01-15 PROCEDURE — 36415 COLL VENOUS BLD VENIPUNCTURE: CPT

## 2025-01-15 PROCEDURE — 80048 BASIC METABOLIC PNL TOTAL CA: CPT

## 2025-01-15 PROCEDURE — 6360000002 HC RX W HCPCS: Performed by: RADIOLOGY

## 2025-01-15 PROCEDURE — 99152 MOD SED SAME PHYS/QHP 5/>YRS: CPT | Performed by: RADIOLOGY

## 2025-01-15 RX ORDER — MIDAZOLAM HYDROCHLORIDE 1 MG/ML
INJECTION, SOLUTION INTRAMUSCULAR; INTRAVENOUS PRN
Status: COMPLETED | OUTPATIENT
Start: 2025-01-15 | End: 2025-01-15

## 2025-01-15 RX ORDER — FLUCONAZOLE 100 MG/1
200 TABLET ORAL DAILY
Status: COMPLETED | OUTPATIENT
Start: 2025-01-16 | End: 2025-01-16

## 2025-01-15 RX ORDER — SODIUM CHLORIDE 9 MG/ML
INJECTION, SOLUTION INTRAVENOUS CONTINUOUS PRN
Status: COMPLETED | OUTPATIENT
Start: 2025-01-15 | End: 2025-01-15

## 2025-01-15 RX ORDER — LIDOCAINE HYDROCHLORIDE 10 MG/ML
INJECTION, SOLUTION INFILTRATION; PERINEURAL PRN
Status: COMPLETED | OUTPATIENT
Start: 2025-01-15 | End: 2025-01-15

## 2025-01-15 RX ORDER — FLUCONAZOLE 100 MG/1
100 TABLET ORAL ONCE
Status: COMPLETED | OUTPATIENT
Start: 2025-01-15 | End: 2025-01-15

## 2025-01-15 RX ORDER — FENTANYL CITRATE 50 UG/ML
INJECTION, SOLUTION INTRAMUSCULAR; INTRAVENOUS PRN
Status: COMPLETED | OUTPATIENT
Start: 2025-01-15 | End: 2025-01-15

## 2025-01-15 RX ADMIN — SODIUM CHLORIDE, PRESERVATIVE FREE 10 ML: 5 INJECTION INTRAVENOUS at 20:51

## 2025-01-15 RX ADMIN — ALBUTEROL SULFATE 2.5 MG: 2.5 SOLUTION RESPIRATORY (INHALATION) at 07:29

## 2025-01-15 RX ADMIN — FENTANYL CITRATE 50 MCG: 50 INJECTION, SOLUTION INTRAMUSCULAR; INTRAVENOUS at 10:24

## 2025-01-15 RX ADMIN — ASPIRIN 81 MG: 81 TABLET, COATED ORAL at 08:31

## 2025-01-15 RX ADMIN — SODIUM CHLORIDE 50 ML/HR: 9 INJECTION, SOLUTION INTRAVENOUS at 10:18

## 2025-01-15 RX ADMIN — ALBUTEROL SULFATE 2.5 MG: 2.5 SOLUTION RESPIRATORY (INHALATION) at 20:58

## 2025-01-15 RX ADMIN — IOHEXOL 7 ML: 300 INJECTION, SOLUTION INTRAVENOUS at 10:31

## 2025-01-15 RX ADMIN — FLUCONAZOLE 100 MG: 100 TABLET ORAL at 12:15

## 2025-01-15 RX ADMIN — FENTANYL CITRATE 50 MCG: 50 INJECTION, SOLUTION INTRAMUSCULAR; INTRAVENOUS at 10:18

## 2025-01-15 RX ADMIN — INSULIN LISPRO 4 UNITS: 100 INJECTION, SOLUTION INTRAVENOUS; SUBCUTANEOUS at 21:22

## 2025-01-15 RX ADMIN — INSULIN LISPRO 2 UNITS: 100 INJECTION, SOLUTION INTRAVENOUS; SUBCUTANEOUS at 08:34

## 2025-01-15 RX ADMIN — DOCUSATE SODIUM 50 MG AND SENNOSIDES 8.6 MG 1 TABLET: 8.6; 5 TABLET, FILM COATED ORAL at 08:31

## 2025-01-15 RX ADMIN — MIDAZOLAM 1 MG: 1 INJECTION INTRAMUSCULAR; INTRAVENOUS at 10:18

## 2025-01-15 RX ADMIN — ALBUTEROL SULFATE 2.5 MG: 2.5 SOLUTION RESPIRATORY (INHALATION) at 16:16

## 2025-01-15 RX ADMIN — FLUCONAZOLE 100 MG: 100 TABLET ORAL at 08:31

## 2025-01-15 RX ADMIN — MIDAZOLAM 1 MG: 1 INJECTION INTRAMUSCULAR; INTRAVENOUS at 10:24

## 2025-01-15 RX ADMIN — SODIUM CHLORIDE, PRESERVATIVE FREE 10 ML: 5 INJECTION INTRAVENOUS at 08:35

## 2025-01-15 RX ADMIN — SODIUM CHLORIDE, POTASSIUM CHLORIDE, SODIUM LACTATE AND CALCIUM CHLORIDE: 600; 310; 30; 20 INJECTION, SOLUTION INTRAVENOUS at 05:01

## 2025-01-15 RX ADMIN — DOCUSATE SODIUM 50 MG AND SENNOSIDES 8.6 MG 1 TABLET: 8.6; 5 TABLET, FILM COATED ORAL at 20:52

## 2025-01-15 RX ADMIN — LIDOCAINE HYDROCHLORIDE 10 ML: 10 INJECTION, SOLUTION INFILTRATION; PERINEURAL at 10:25

## 2025-01-15 RX ADMIN — INSULIN LISPRO 4 UNITS: 100 INJECTION, SOLUTION INTRAVENOUS; SUBCUTANEOUS at 16:41

## 2025-01-15 RX ADMIN — TRIAMTERENE AND HYDROCHLOROTHIAZIDE 0.5 TABLET: 37.5; 25 TABLET ORAL at 08:33

## 2025-01-15 RX ADMIN — WATER 1000 MG: 1 INJECTION INTRAMUSCULAR; INTRAVENOUS; SUBCUTANEOUS at 08:34

## 2025-01-15 ASSESSMENT — PAIN - FUNCTIONAL ASSESSMENT: PAIN_FUNCTIONAL_ASSESSMENT: NONE - DENIES PAIN

## 2025-01-15 NOTE — PROGRESS NOTES
Pt resting in bed at present time without complaints. IVF infusing. NPO since midnight. CHG bath given. Rounds performed, all needs met this shift. Bed locked and low, call light within reach. Will give report to oncoming day shift nurse.

## 2025-01-15 NOTE — PROGRESS NOTES
Nutrition Assessment  Assessment Type: Initial  Reason for visit:  Best Practice Alert: Malnutrition Screening Tool   Malnutrition Screening Tool Score: 3    Nutrition Intervention:   Food and/or Nutrient Delivery:   Meals and Snacks:  Diet: Continue current order  Medical Food Supplements:   Medical food supplement therapy:  Initiate Glucerna Nutrition Shake (diabetic oral supplement) 220 calories, 10 grams protein per 8 ounce serving     Malnutrition Assessment:  Malnutrition Status: At risk for malnutrition (+ wt loss, declining oral intake)  Context: Chronic Illness  Findings of clinical characteristics of malnutrition:   Energy Intake:  Mild decrease in energy intake (po intake trending down since Dec, recent intake has consisted of soup and glucerna (1-2 servings daily))  Weight Loss:  Greater than 5% over 1 month (7% loss since Dec)     Body Fat Loss:  Unable to assess     Muscle Mass Loss:  Unable to assess    Nutrition Focused Physical Exam:  Pt actively eating during RD visit, Respiratory therapy waiting for breathing treatment    Nutrition Assessment:  Food/Nutrition Related History:   Per 12/23 note: Pt reports in the past she had a large appetite but it has declined. She eats 3 meals daily to prevent her blood sugars from dropping. +dexcom, reports use of SSI at home. Glucophage, lantus, SSI active on medications prior to admission. Meal intake includes oatmeal in am, smart ones for noon meal and varied items at evening meal. She generally uses items from [employer], take/bake items. Family notes pt eats less than she has in the past but recent intake has remained consistent with usual patterns.   Pt reports intake has declined since that time, she notes primarily taking only soups and glucerna as of recent.  Drinking ~2 glucerna daily.          Weight History:   Per RD note 12/23/24:   Pt reports # indicating likely within the last year after asking if I wanted to know weight before or after

## 2025-01-15 NOTE — PRE SEDATION
Sedation Pre-Procedure Note    Patient Name: Maria Alejandra Castillo   YOB: 1945  Room/Bed: [facility]  Medical Record Number: [id number]  Date: 1/15/2025   Time: 10:01 AM       Indication:  hydroephrosis    Consent: I have discussed with the patient and/or the patient representative the indication, alternatives, and the possible risks and/or complications of the planned procedure and the anesthesia methods. The patient and/or patient representative appear to understand and agree to proceed.    Vital Signs:   Vitals:    01/15/25 0858   BP: (!) 124/59   Pulse: 74   Resp: 18   Temp: 97.2 °F (36.2 °C)   SpO2: 96%       Past Medical History:   has a past medical history of Anemia, Anxiety, Cancer (HCC), Class 1 obesity with body mass index (BMI) of 32.0 to 32.9 in adult, COPD (chronic obstructive pulmonary disease) (HCC), Diabetic polyneuropathy associated with type 2 diabetes mellitus (HCC), Elevated liver function tests, GERD (gastroesophageal reflux disease), Heart murmur, History of blood transfusion, HTN (hypertension), Hyperlipidemia, Obesity (BMI 30-39.9), Smoking, Type 2 diabetes mellitus (HCC), Urothelial cancer (HCC), Vitamin B12 deficiency, and Vitamin D deficiency.    Past Surgical History:   has a past surgical history that includes Hysterectomy (age 36); Carotid endarterectomy (Right, 06/13/2019); heent (2013 ); Breast biopsy (Right); Hysterectomy (2/2015); Salpingo-oophorectomy (Right, age 29); Salpingo-oophorectomy (Left, age 19); Bladder surgery (N/A, 12/23/2024); and Bladder surgery (N/A, 1/14/2025).    Medications:   Scheduled Meds:    albuterol  2.5 mg Nebulization 4x Daily RT    amLODIPine  5 mg Oral Daily    aspirin  81 mg Oral Daily    lisinopril  40 mg Oral Daily    triamterene-hydroCHLOROthiazide  0.5 tablet Oral Daily    sodium chloride flush  5-40 mL IntraVENous 2 times per day    sennosides-docusate sodium  1 tablet Oral BID    fluconazole  100 mg Oral Daily    cefTRIAXone (ROCEPHIN) IV   symptoms of irregular blood glucose. Dispense sufficient amount for indicated testing frequency plus additional to accommodate PRN testing needs. 10/3/22   Saba Meza APRN - CNP           Pre-Sedation Documentation and Exam:   I have reviewed the patient's history and review of systems.    Mallampati Airway Assessment:  Mallampati Class I - (soft palate, fauces, uvula & anterior/posterior tonsillar pillars are visible)    Prior History of Anesthesia Complications:   none    ASA Classification:  Class 3 - A patient with severe systemic disease that limits activity but is not incapacitating    Sedation/ Anesthesia Plan:   intravenous sedation    Medications Planned:   midazolam (Versed) intravenously and fentanyl intravenously    Patient is an appropriate candidate for plan of sedation: yes    Electronically signed by PETER MAGDALENO MD on 1/15/2025 at 10:01 AM

## 2025-01-15 NOTE — PROGRESS NOTES
TRANSFER - OUT REPORT:           Verbal report given to Silvestre GARDNER on Maria Alejandra Castillo  being transferred to 6th Floor for routine progression of patient care      Report consisted of patient’s Situation, Background, Assessment and Recommendations(SBAR).          Information from the following report(s) SBAR, Procedure Summary, and MAR was reviewed with the receiving nurse.       Opportunity for questions and clarification was provided.          Conscious Sedation:    100 Mcg of Fentanyl administered   2 Mg of Versed administered   0 Mg of Benadryl administered        Pt tolerated procedure well.          Right flank  dry, 4 x 4 gauze, and bandaid-type dressing clean, dry, intact, and nontender

## 2025-01-15 NOTE — PROGRESS NOTES
Admit Date: 1/14/2025    Subjective:     Patient has no new complaints.     Objective:     Patient Vitals for the past 8 hrs:   BP Temp Temp src Pulse Resp SpO2 Height Weight   01/15/25 1134 (!) 113/58 -- -- 86 16 96 % -- --   01/15/25 1119 (!) 102/45 -- -- 66 16 95 % -- --   01/15/25 1108 (!) 98/52 -- -- 70 16 96 % -- --   01/15/25 1059 (!) 111/54 -- -- 72 16 96 % -- --   01/15/25 1049 122/60 -- -- 64 16 96 % -- --   01/15/25 1042 (!) 137/57 -- -- 84 16 99 % -- --   01/15/25 1036 (!) 112/56 -- -- 75 -- 99 % -- --   01/15/25 1030 (!) 114/54 -- -- 74 14 99 % -- --   01/15/25 1026 123/72 -- -- 78 14 100 % -- --   01/15/25 1022 (!) 154/89 -- -- 75 14 100 % -- --   01/15/25 1017 (!) 155/69 -- -- 82 16 100 % -- --   01/15/25 0858 (!) 124/59 97.2 °F (36.2 °C) Infrared 74 18 96 % 1.499 m (4' 11\") 55.8 kg (123 lb)   01/15/25 0710 (!) 119/56 98.1 °F (36.7 °C) Oral 72 18 94 % -- --   01/15/25 0419 118/60 98.2 °F (36.8 °C) Oral 74 17 96 % -- 55.8 kg (123 lb 0.3 oz)     No intake/output data recorded.  01/13 1901 - 01/15 0700  In: 670 [P.O.:120; I.V.:500]  Out: 1225 [Urine:1225]    Physical Exam:  GENERAL ASSESSMENT: alert, oriented to person, place and time, no acute distress and no anxiety, depression or agitation  Chest: Easy work of breathing  CVS exam: RRR  ABDOMEN: not done  Neurological exam reveals alert, oriented, normal speech, no focal findings or movement disorder noted.  FEMALE GENITOURINARY EXAM: mederos draining dark pink urine  MALE GENITAL EXAM: not done        Data Review   Recent Results (from the past 24 hour(s))   POCT Glucose    Collection Time: 01/14/25  1:20 PM   Result Value Ref Range    POC Glucose 277 (H) 65 - 100 mg/dL    Performed by: Jerrod    POCT Glucose    Collection Time: 01/14/25  3:02 PM   Result Value Ref Range    POC Glucose 286 (H) 65 - 100 mg/dL    Performed by: Huyen    POCT Glucose    Collection Time: 01/14/25  4:31 PM   Result Value Ref Range    POC Glucose 219 (H)  65 - 100 mg/dL    Performed by: Nadege    POCT Glucose    Collection Time: 01/14/25  8:53 PM   Result Value Ref Range    POC Glucose 241 (H) 65 - 100 mg/dL    Performed by: Nicole    Basic Metabolic Panel    Collection Time: 01/15/25  6:39 AM   Result Value Ref Range    Sodium 138 136 - 145 mmol/L    Potassium 4.4 3.5 - 5.1 mmol/L    Chloride 103 98 - 107 mmol/L    CO2 25 20 - 29 mmol/L    Anion Gap 10 7 - 16 mmol/L    Glucose 183 (H) 70 - 99 mg/dL    BUN 17 8 - 23 MG/DL    Creatinine 1.12 (H) 0.60 - 1.10 MG/DL    Est, Glom Filt Rate 50 (L) >60 ml/min/1.73m2    Calcium 8.4 (L) 8.8 - 10.2 MG/DL   CBC    Collection Time: 01/15/25  6:39 AM   Result Value Ref Range    WBC 10.2 4.3 - 11.1 K/uL    RBC 3.27 (L) 4.05 - 5.2 M/uL    Hemoglobin 9.0 (L) 11.7 - 15.4 g/dL    Hematocrit 29.8 (L) 35.8 - 46.3 %    MCV 91.1 82 - 102 FL    MCH 27.5 26.1 - 32.9 PG    MCHC 30.2 (L) 31.4 - 35.0 g/dL    RDW 19.5 (H) 11.9 - 14.6 %    Platelets 433 150 - 450 K/uL    MPV 11.3 9.4 - 12.3 FL    nRBC 0.00 0.0 - 0.2 K/uL   POCT Glucose    Collection Time: 01/15/25  7:26 AM   Result Value Ref Range    POC Glucose 189 (H) 65 - 100 mg/dL    Performed by: Nadege        No results found.      Assessment:     Principal Problem:    Urothelial carcinoma (HCC)  Active Problems:    Urothelial carcinoma of bladder with invasion of muscle (HCC)  Resolved Problems:    * No resolved hospital problems. *    Pt with bladder cancer, S/P cysto, evac of clot and likely fungus ball. Pt with hydronephrosis, right UO could not be identified with cysto. Urine cx pending. Cr 1.12. WBC 10.2.   Plan:   Cont diflucan.  IR consulted for right neph tube placement. Appreciate help.  Ok for pt to eat following procedure.  Keep mederos to drainage.  SLIV.  Plan to d/c Thursday with mederos.      SRIKANTH Hurtado  [facility]    Phone: [phone]

## 2025-01-15 NOTE — OP NOTE
[facility]  Department of Interventional Radiology  [phone]        Interventional Radiology Brief Procedure Note    Patient: Maria Alejandra Castillo MRN: [id number]  SSN: [id number]    YOB: 1945  Age: 79 y.o.  Sex: female      Date of Procedure: 1/15/2025    Pre-Procedure Diagnosis: hydronephrosis    Post-Procedure Diagnosis: SAME    Procedure(s): Percutaneous Nephrostomy Tube Placement    Brief Description of Procedure: as above    Performed By: PETER MAGDALENO MD     Assistants: None    Anesthesia:Moderate Sedation    Estimated Blood Loss: Less than 10ml    Specimens:  None    Implants:  Nephrostomy Drain    Findings: irhgt hydro.  19 f drain placed    Complications: None    Recommendations: external drainage     Follow Up: as per urology    Signed By: PETER MAGDALENO MD     January 15, 2025

## 2025-01-15 NOTE — CARE COORDINATION
CM met with Ms. Castillo and her brother Yogesh in room 511 to discuss discharge planning.  She is inpatient status for bladder cancer, and is POD #1 cystoscopy and clot evacuation (TURBT cancelled).  She had a previous TURBT 12/23/2024.     Prior to this procedure, she was living at home alone in [place], [place].  Her son Zev lives nearby, and her brother Yogesh lives in [place], [place].  Yogesh will stay here for about 2 weeks.      Ms. Castillo was at [facility] 1/5-1/7 for UTI, and [facility] PT and SN was set up at discharge,  Ms. Castillo went to the [facility] 1/8 for Serna catheter fell out and was replaced.  She was prescribed Vantin.      At discharge, she would like to return home, and resume home health.  Resumption of Care (MAG) order and referral for home health OT, PT, and SN sent via Epic to [facility].  No other discharge needs voiced or identified, but CM will follow along and assist as needed.       01/15/25 0842   Service Assessment   Patient Orientation Alert and Oriented   Cognition Alert   History Provided By Patient   Primary Caregiver Self   Accompanied By/Relationship brother Yogesh   Support Systems Children;Family Members   PCP Verified by CM Yes   Last Visit to PCP Within last 3 months   Prior Functional Level Independent in ADLs/IADLs;Mobility  (uses RW at home)   Current Functional Level Independent in ADLs/IADLs;Mobility  (RW)   Can patient return to prior living arrangement Yes   Ability to make needs known: Good   Family able to assist with home care needs: Yes   Would you like for me to discuss the discharge plan with any other family members/significant others, and if so, who? Yes  (yes, with her brother Yogesh in room and son Zev later today)   Condition of Participation: Discharge Planning   The Plan for Transition of Care is related to the following treatment goals: home when stable, MAG [facility] OT/PT/SN

## 2025-01-16 VITALS
HEIGHT: 59 IN | BODY MASS INDEX: 24.8 KG/M2 | HEART RATE: 66 BPM | WEIGHT: 123 LBS | OXYGEN SATURATION: 94 % | TEMPERATURE: 98.1 F | SYSTOLIC BLOOD PRESSURE: 150 MMHG | DIASTOLIC BLOOD PRESSURE: 53 MMHG | RESPIRATION RATE: 16 BRPM

## 2025-01-16 DIAGNOSIS — C68.9 UROTHELIAL CARCINOMA (HCC): Primary | ICD-10-CM

## 2025-01-16 LAB
ANION GAP SERPL CALC-SCNC: 10 MMOL/L (ref 7–16)
BASOPHILS # BLD: 0.06 K/UL (ref 0–0.2)
BASOPHILS NFR BLD: 0.8 % (ref 0–2)
BUN SERPL-MCNC: 15 MG/DL (ref 8–23)
CALCIUM SERPL-MCNC: 8.5 MG/DL (ref 8.8–10.2)
CHLORIDE SERPL-SCNC: 101 MMOL/L (ref 98–107)
CO2 SERPL-SCNC: 25 MMOL/L (ref 20–29)
CREAT SERPL-MCNC: 0.94 MG/DL (ref 0.6–1.1)
DIFFERENTIAL METHOD BLD: ABNORMAL
EOSINOPHIL # BLD: 0.23 K/UL (ref 0–0.8)
EOSINOPHIL NFR BLD: 2.9 % (ref 0.5–7.8)
ERYTHROCYTE [DISTWIDTH] IN BLOOD BY AUTOMATED COUNT: 19.1 % (ref 11.9–14.6)
GLUCOSE BLD STRIP.AUTO-MCNC: 232 MG/DL (ref 65–100)
GLUCOSE BLD STRIP.AUTO-MCNC: 246 MG/DL (ref 65–100)
GLUCOSE SERPL-MCNC: 249 MG/DL (ref 70–99)
HCT VFR BLD AUTO: 27.9 % (ref 35.8–46.3)
HGB BLD-MCNC: 8.5 G/DL (ref 11.7–15.4)
IMM GRANULOCYTES # BLD AUTO: 0.21 K/UL (ref 0–0.5)
IMM GRANULOCYTES NFR BLD AUTO: 2.6 % (ref 0–5)
LYMPHOCYTES # BLD: 0.67 K/UL (ref 0.5–4.6)
LYMPHOCYTES NFR BLD: 8.4 % (ref 13–44)
MCH RBC QN AUTO: 27.3 PG (ref 26.1–32.9)
MCHC RBC AUTO-ENTMCNC: 30.5 G/DL (ref 31.4–35)
MCV RBC AUTO: 89.7 FL (ref 82–102)
MONOCYTES # BLD: 0.73 K/UL (ref 0.1–1.3)
MONOCYTES NFR BLD: 9.2 % (ref 4–12)
NEUTS SEG # BLD: 6.04 K/UL (ref 1.7–8.2)
NEUTS SEG NFR BLD: 76.1 % (ref 43–78)
NRBC # BLD: 0 K/UL (ref 0–0.2)
PLATELET # BLD AUTO: 443 K/UL (ref 150–450)
PMV BLD AUTO: 10.9 FL (ref 9.4–12.3)
POTASSIUM SERPL-SCNC: 4.4 MMOL/L (ref 3.5–5.1)
RBC # BLD AUTO: 3.11 M/UL (ref 4.05–5.2)
SERVICE CMNT-IMP: ABNORMAL
SERVICE CMNT-IMP: ABNORMAL
SODIUM SERPL-SCNC: 136 MMOL/L (ref 136–145)
WBC # BLD AUTO: 7.9 K/UL (ref 4.3–11.1)

## 2025-01-16 PROCEDURE — 82962 GLUCOSE BLOOD TEST: CPT

## 2025-01-16 PROCEDURE — 6370000000 HC RX 637 (ALT 250 FOR IP): Performed by: UROLOGY

## 2025-01-16 PROCEDURE — 85025 COMPLETE CBC W/AUTO DIFF WBC: CPT

## 2025-01-16 PROCEDURE — 36415 COLL VENOUS BLD VENIPUNCTURE: CPT

## 2025-01-16 PROCEDURE — 6360000002 HC RX W HCPCS: Performed by: UROLOGY

## 2025-01-16 PROCEDURE — 2500000003 HC RX 250 WO HCPCS: Performed by: UROLOGY

## 2025-01-16 PROCEDURE — 99024 POSTOP FOLLOW-UP VISIT: CPT | Performed by: PHYSICIAN ASSISTANT

## 2025-01-16 PROCEDURE — 80048 BASIC METABOLIC PNL TOTAL CA: CPT

## 2025-01-16 RX ORDER — ALBUTEROL SULFATE 0.83 MG/ML
2.5 SOLUTION RESPIRATORY (INHALATION) EVERY 4 HOURS PRN
Status: DISCONTINUED | OUTPATIENT
Start: 2025-01-16 | End: 2025-01-16 | Stop reason: HOSPADM

## 2025-01-16 RX ORDER — LEVOFLOXACIN 500 MG/1
500 TABLET, FILM COATED ORAL DAILY
Qty: 3 TABLET | Refills: 0 | Status: SHIPPED | OUTPATIENT
Start: 2025-01-16 | End: 2025-01-19

## 2025-01-16 RX ADMIN — INSULIN LISPRO 2 UNITS: 100 INJECTION, SOLUTION INTRAVENOUS; SUBCUTANEOUS at 12:18

## 2025-01-16 RX ADMIN — FLUCONAZOLE 200 MG: 100 TABLET ORAL at 09:52

## 2025-01-16 RX ADMIN — LISINOPRIL 40 MG: 20 TABLET ORAL at 09:52

## 2025-01-16 RX ADMIN — AMLODIPINE BESYLATE 5 MG: 5 TABLET ORAL at 09:52

## 2025-01-16 RX ADMIN — TRIAMTERENE AND HYDROCHLOROTHIAZIDE 0.5 TABLET: 37.5; 25 TABLET ORAL at 09:52

## 2025-01-16 RX ADMIN — SODIUM CHLORIDE, PRESERVATIVE FREE 10 ML: 5 INJECTION INTRAVENOUS at 09:53

## 2025-01-16 RX ADMIN — WATER 1000 MG: 1 INJECTION INTRAMUSCULAR; INTRAVENOUS; SUBCUTANEOUS at 09:45

## 2025-01-16 RX ADMIN — INSULIN LISPRO 2 UNITS: 100 INJECTION, SOLUTION INTRAVENOUS; SUBCUTANEOUS at 09:44

## 2025-01-16 RX ADMIN — ASPIRIN 81 MG: 81 TABLET, COATED ORAL at 09:53

## 2025-01-16 ASSESSMENT — PAIN SCALES - GENERAL: PAINLEVEL_OUTOF10: 0

## 2025-01-16 NOTE — DISCHARGE SUMMARY
Discharge Summary    Date: 1/16/2025  Patient Name: Maria Alejandra Castillo    YOB: 1945     Age: 79 y.o.    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 *      Hospital Stay  Narrative of Hospital Course:  The patient underwent Cystoscopy with evacuation of clot and likely fungus ball. Procedure was without complications. Following the procedure, patient was transferred to PACU and admitted to the urology floor for convalescence. Postoperatively, the patient progressed as expected. She underwent R neph tube placement with IR without incident. D/c with mederos and neph tube.      Consultants:  IP CONSULT TO INTERVENTIONAL RADIOLOGY  IP CONSULT HOME HEALTH    Surgeries/procedures Performed:  Cystoscopy with evacuation of clot and likely fungus ball.  R Percutaneous Nephrostomy Tube Placement.     Treatments:    Analgesia, Antibiotics, IV Hydration and Surgery        Discharge Plan/Disposition:  Home    Hospital/Incidental Findings Requiring Follow Up:    Patient Instructions:    Diet: Regular Diet    Activity:Activity as Tolerated  For number of days (if applicable):      Other Instructions: F/u as scheduled.    Provider Follow-Up:   No follow-ups on file.     Significant Diagnostic Studies:    Recent Labs:  Admission on 01/14/2025, Discharged on 01/16/2025  POC Glucose                                   Date: 01/14/2025  Value: 277 (H)     Ref range: 65 - 100 mg/dL     Status: Final                Comment: 47 - 60 mg/dl Consistent with, but not fully diagnostic of hypoglycemia.  101 - 125 mg/dl Impaired fasting glucose/consistent with  Final  CO2                                           Date: 01/15/2025  Value: 25          Ref range: 20 - 29 mmol/L     Status: Final  Anion Gap                                     Date: 01/15/2025  Value: 10          Ref range: 7 - 16 mmol/L      Status: Final  Glucose                                       Date: 01/15/2025  Value: 183 (H)     Ref range: 70 - 99 mg/dL      Status: Final                Comment: <70 mg/dL Consistent with, but not fully diagnostic of hypoglycemia.  100 - 125 mg/dL Impaired fasting glucose/consistent with pre-diabetes mellitus.  > 126 mg/dl Fasting glucose consistent with overt diabetes mellitus    BUN                                           Date: 01/15/2025  Value: 17          Ref range: 8 - 23 MG/DL       Status: Final  Creatinine                                    Date: 01/15/2025  Value: 1.12 (H)    Ref range: 0.60 - 1.10 MG/DL  Status: Final  Est, Glom Filt Rate                           Date: 01/15/2025  Value: 50 (L)      Ref range: >60 ml/min/1.73m2  Status: Final                Comment:    Pediatric calculator link: https://www.kidney.org/professionals/kdoqi/gfr_calculatorped     These results are not intended for use in patients <18 years of age.     eGFR results are calculated without a race factor using  the 2021 CKD-EPI equation. Careful clinical correlation is recommended, particularly when comparing to results calculated using previous equations.  The CKD-EPI equation is less accurate in patients with extremes of muscle mass, extra-renal metabolism of creatinine, excessive creatine ingestion, or following therapy that affects renal tubular secretion.    Calcium                                       Date: 01/15/2025  Value: 8.4 (L)     Ref range: 8.8 - 10.2 MG/DL   Status: Final  WBC                                           Date: 01/15/2025  Value: 10.2        Ref range: 4.3 - 11.1 K/uL    Status: Final  RBC                                           Date:

## 2025-01-16 NOTE — PROGRESS NOTES
Admit Date: 1/14/2025    Subjective:     Patient has no new complaints.     Objective:     Patient Vitals for the past 8 hrs:   BP Temp Temp src Pulse Resp SpO2   01/16/25 0718 (!) 150/53 98.1 °F (36.7 °C) Oral 66 16 94 %     No intake/output data recorded.  01/14 1901 - 01/16 0700  In: 120 [P.O.:120]  Out: 3000 [Urine:3000]    Physical Exam:  GENERAL ASSESSMENT: alert, oriented to person, place and time, no acute distress and no anxiety, depression or agitation  Chest: Easy work of breathing  CVS exam: RRR  ABDOMEN: not done  Neurological exam reveals alert, oriented, normal speech, no focal findings or movement disorder noted.  FEMALE GENITOURINARY EXAM: mederos draining clear yellow urine  MALE GENITAL EXAM: not done        Data Review   Recent Results (from the past 24 hour(s))   POCT Glucose    Collection Time: 01/15/25  4:36 PM   Result Value Ref Range    POC Glucose 257 (H) 65 - 100 mg/dL    Performed by: SaidaSofiaPCT    POCT Glucose    Collection Time: 01/15/25  9:04 PM   Result Value Ref Range    POC Glucose 257 (H) 65 - 100 mg/dL    Performed by: Colin    CBC with Auto Differential    Collection Time: 01/16/25  5:09 AM   Result Value Ref Range    WBC 7.9 4.3 - 11.1 K/uL    RBC 3.11 (L) 4.05 - 5.2 M/uL    Hemoglobin 8.5 (L) 11.7 - 15.4 g/dL    Hematocrit 27.9 (L) 35.8 - 46.3 %    MCV 89.7 82 - 102 FL    MCH 27.3 26.1 - 32.9 PG    MCHC 30.5 (L) 31.4 - 35.0 g/dL    RDW 19.1 (H) 11.9 - 14.6 %    Platelets 443 150 - 450 K/uL    MPV 10.9 9.4 - 12.3 FL    nRBC 0.00 0.0 - 0.2 K/uL    Differential Type AUTOMATED      Neutrophils % 76.1 43.0 - 78.0 %    Lymphocytes % 8.4 (L) 13.0 - 44.0 %    Monocytes % 9.2 4.0 - 12.0 %    Eosinophils % 2.9 0.5 - 7.8 %    Basophils % 0.8 0.0 - 2.0 %    Immature Granulocytes % 2.6 0.0 - 5.0 %    Neutrophils Absolute 6.04 1.70 - 8.20 K/UL    Lymphocytes Absolute 0.67 0.50 - 4.60 K/UL    Monocytes Absolute 0.73 0.10 - 1.30 K/UL    Eosinophils Absolute 0.23 0.00 - 0.80 K/UL

## 2025-01-16 NOTE — PLAN OF CARE
Problem: Pain  Goal: Verbalizes/displays adequate comfort level or baseline comfort level  1/16/2025 1143 by Nery Larry RN  Outcome: Progressing  1/15/2025 2148 by Brenda Delacruz RN  Outcome: Progressing     Problem: Safety - Adult  Goal: Free from fall injury  1/16/2025 1143 by Nery Larry RN  Outcome: Progressing  1/15/2025 2148 by Brenda Delacruz RN  Outcome: Progressing     Problem: Skin/Tissue Integrity  Goal: Absence of new skin breakdown  Description: 1.  Monitor for areas of redness and/or skin breakdown  2.  Assess vascular access sites hourly  3.  Every 4-6 hours minimum:  Change oxygen saturation probe site  4.  Every 4-6 hours:  If on nasal continuous positive airway pressure, respiratory therapy assess nares and determine need for appliance change or resting period.  1/15/2025 2148 by Brenda Delacruz RN  Outcome: Progressing     Problem: Chronic Conditions and Co-morbidities  Goal: Patient's chronic conditions and co-morbidity symptoms are monitored and maintained or improved  1/15/2025 2148 by Brenda Delacruz RN  Outcome: Progressing     Problem: Discharge Planning  Goal: Discharge to home or other facility with appropriate resources  1/16/2025 1143 by Nery Larry RN  Outcome: Progressing  1/15/2025 2148 by Brenda Delacruz RN  Outcome: Progressing

## 2025-01-16 NOTE — PROGRESS NOTES
Pt resting in bed at present time without complaints. Serna and nephro patent and draining.  Rounds performed, all needs met this shift. Bed locked and low, call light within reach. Will give report to oncoming day shift nurse.

## 2025-01-19 LAB
BACTERIA SPEC CULT: ABNORMAL
BACTERIA SPEC CULT: ABNORMAL
SERVICE CMNT-IMP: ABNORMAL

## 2025-01-21 ENCOUNTER — CLINICAL DOCUMENTATION (OUTPATIENT)
Dept: CASE MANAGEMENT | Age: 80
End: 2025-01-21

## 2025-01-21 NOTE — PROGRESS NOTES
Nurse Navigation outreach completed to educate and address any barriers related to intake and SDOH    Navigation Intake 1/21/2025    I reviewed Social Determinants of Health and Oncology Care Team. The role of navigation services and how to communicate with office have been reviewed and updated.  Referring provider notified of intake and upcoming appointments.    Type of cancer: urothelial     Current Urologist: Dr. Lipscomb  Appointment with Oncology: 1/24/25 with Dr. Gardiner    My Chart message to patient with navigation contact information and upcoming appointments.    Goal of next outreach: Follow up on Med Onc consult  Time Spent: 20 minutes

## 2025-01-21 NOTE — PROGRESS NOTES
Urologic Oncology  [facility]  [address] [place], [place] [id number]  [phone]        Ms. Maria Alejandra Castillo is a 79 y.o. female with a diagnosis of muscle invasive urothelial carcinoma of the bladder with right hydronephrosis    INTERVAL HISTORY: Patient is here today with her son.  Since I last saw her she has completed her antibiotics and antifungals.  Her urine is now completely clear and smells normal.  She denies any fevers or chills.  She has a Serna catheter in place as well as a right percutaneous nephrostomy tube.    She is in a wheelchair today and is requesting to work with our social workers to see about potential placement in a rehab facility.    She has an appointment with Dr. Gardiner today to discuss potential definitive treatment with combination chemotherapy and radiation therapy.      From previous note on 12/27/24:Maria Alejandra Castillo is a 79 y.o. female with urothelial carcinoma of the bladder.     Patient is from [facility] and lives in [place].  She is here today with her brother who is from out of town, and her son and daughter-in-law who live near her.     She was referred by Dr. Lacey.     She initially presented with gross hematuria that led to a CT scan on 12/22/2024 which showed moderate right hydronephrosis with a large mass in her bladder that measured almost 3-1/2 cm.  She was taken for TURBT on 12/23/2024 and her pathology was consistent with high-grade muscle invasive urothelial cancer of the bladder.  Dr. Lacey was not able to find the right UO in obviously could not place a stent.     She had a recent chest CT that was negative for metastatic disease.  Her previous CT of her abdomen and pelvis did not show any lymphadenopathy.     Her creatinine today is stable at 1.2.  She does not have any flank pain.  She denies any ongoing hematuria.     She has multiple comorbidities including diabetes COPD and peripheral vascular disease as well as

## 2025-01-24 ENCOUNTER — OFFICE VISIT (OUTPATIENT)
Dept: ONCOLOGY | Age: 80
End: 2025-01-24
Payer: MEDICARE

## 2025-01-24 ENCOUNTER — HOSPITAL ENCOUNTER (OUTPATIENT)
Dept: LAB | Age: 80
Discharge: HOME | End: 2025-01-24
Payer: MEDICARE

## 2025-01-24 ENCOUNTER — PREP FOR PROCEDURE (OUTPATIENT)
Dept: ONCOLOGY | Age: 80
End: 2025-01-24

## 2025-01-24 ENCOUNTER — OFFICE VISIT (OUTPATIENT)
Dept: ONCOLOGY | Age: 80
End: 2025-01-24

## 2025-01-24 VITALS
WEIGHT: 120 LBS | HEART RATE: 90 BPM | HEIGHT: 59 IN | DIASTOLIC BLOOD PRESSURE: 75 MMHG | TEMPERATURE: 97.7 F | OXYGEN SATURATION: 96 % | SYSTOLIC BLOOD PRESSURE: 120 MMHG | BODY MASS INDEX: 24.19 KG/M2 | RESPIRATION RATE: 10 BRPM

## 2025-01-24 VITALS
TEMPERATURE: 97.7 F | OXYGEN SATURATION: 96 % | DIASTOLIC BLOOD PRESSURE: 75 MMHG | HEART RATE: 90 BPM | RESPIRATION RATE: 10 BRPM | SYSTOLIC BLOOD PRESSURE: 120 MMHG | HEIGHT: 59 IN | BODY MASS INDEX: 24.19 KG/M2 | WEIGHT: 120 LBS

## 2025-01-24 DIAGNOSIS — C68.9 UROTHELIAL CARCINOMA (HCC): Primary | ICD-10-CM

## 2025-01-24 DIAGNOSIS — C68.9 UROTHELIAL CARCINOMA (HCC): ICD-10-CM

## 2025-01-24 DIAGNOSIS — D64.9 ANEMIA, UNSPECIFIED TYPE: ICD-10-CM

## 2025-01-24 LAB
ALBUMIN SERPL-MCNC: 3.2 G/DL (ref 3.2–4.6)
ALBUMIN/GLOB SERPL: 0.9 (ref 1–1.9)
ALP SERPL-CCNC: 87 U/L (ref 35–104)
ALT SERPL-CCNC: 19 U/L (ref 8–45)
ANION GAP SERPL CALC-SCNC: 15 MMOL/L (ref 7–16)
AST SERPL-CCNC: 21 U/L (ref 15–37)
BASOPHILS # BLD: 0.05 K/UL (ref 0–0.2)
BASOPHILS NFR BLD: 0.5 % (ref 0–2)
BILIRUB SERPL-MCNC: <0.2 MG/DL (ref 0–1.2)
BUN SERPL-MCNC: 28 MG/DL (ref 8–23)
CALCIUM SERPL-MCNC: 10.2 MG/DL (ref 8.8–10.2)
CHLORIDE SERPL-SCNC: 98 MMOL/L (ref 98–107)
CO2 SERPL-SCNC: 22 MMOL/L (ref 20–29)
CREAT SERPL-MCNC: 1.01 MG/DL (ref 0.6–1.1)
DIFFERENTIAL METHOD BLD: ABNORMAL
EOSINOPHIL # BLD: 0.14 K/UL (ref 0–0.8)
EOSINOPHIL NFR BLD: 1.4 % (ref 0.5–7.8)
ERYTHROCYTE [DISTWIDTH] IN BLOOD BY AUTOMATED COUNT: 18.9 % (ref 11.9–14.6)
FERRITIN SERPL-MCNC: 17 NG/ML (ref 8–388)
FOLATE SERPL-MCNC: 8.9 NG/ML (ref 3.1–17.5)
GLOBULIN SER CALC-MCNC: 3.7 G/DL (ref 2.3–3.5)
GLUCOSE SERPL-MCNC: 147 MG/DL (ref 70–99)
HCT VFR BLD AUTO: 32.4 % (ref 35.8–46.3)
HGB BLD-MCNC: 10 G/DL (ref 11.7–15.4)
IMM GRANULOCYTES # BLD AUTO: 0.16 K/UL (ref 0–0.5)
IMM GRANULOCYTES NFR BLD AUTO: 1.6 % (ref 0–5)
IRON SATN MFR SERPL: 9 % (ref 20–50)
IRON SERPL-MCNC: 33 UG/DL (ref 35–100)
LDH SERPL L TO P-CCNC: 151 U/L (ref 127–281)
LYMPHOCYTES # BLD: 0.93 K/UL (ref 0.5–4.6)
LYMPHOCYTES NFR BLD: 9.2 % (ref 13–44)
MCH RBC QN AUTO: 26.2 PG (ref 26.1–32.9)
MCHC RBC AUTO-ENTMCNC: 30.9 G/DL (ref 31.4–35)
MCV RBC AUTO: 85 FL (ref 82–102)
MONOCYTES # BLD: 0.62 K/UL (ref 0.1–1.3)
MONOCYTES NFR BLD: 6.1 % (ref 4–12)
NEUTS SEG # BLD: 8.26 K/UL (ref 1.7–8.2)
NEUTS SEG NFR BLD: 81.2 % (ref 43–78)
NRBC # BLD: 0 K/UL (ref 0–0.2)
PLATELET # BLD AUTO: 517 K/UL (ref 150–450)
PMV BLD AUTO: 10.3 FL (ref 9.4–12.3)
POTASSIUM SERPL-SCNC: 3.9 MMOL/L (ref 3.5–5.1)
PROT SERPL-MCNC: 6.9 G/DL (ref 6.3–8.2)
RBC # BLD AUTO: 3.81 M/UL (ref 4.05–5.2)
SODIUM SERPL-SCNC: 135 MMOL/L (ref 136–145)
TIBC SERPL-MCNC: 346 UG/DL (ref 240–450)
UIBC SERPL-MCNC: 313 UG/DL (ref 112–347)
VIT B12 SERPL-MCNC: 815 PG/ML (ref 193–986)
WBC # BLD AUTO: 10.2 K/UL (ref 4.3–11.1)

## 2025-01-24 PROCEDURE — 80053 COMPREHEN METABOLIC PANEL: CPT

## 2025-01-24 PROCEDURE — 1160F RVW MEDS BY RX/DR IN RCRD: CPT | Performed by: UROLOGY

## 2025-01-24 PROCEDURE — 1159F MED LIST DOCD IN RCRD: CPT | Performed by: UROLOGY

## 2025-01-24 PROCEDURE — 82728 ASSAY OF FERRITIN: CPT

## 2025-01-24 PROCEDURE — 1090F PRES/ABSN URINE INCON ASSESS: CPT | Performed by: UROLOGY

## 2025-01-24 PROCEDURE — G8427 DOCREV CUR MEDS BY ELIG CLIN: HCPCS | Performed by: UROLOGY

## 2025-01-24 PROCEDURE — 84238 ASSAY NONENDOCRINE RECEPTOR: CPT

## 2025-01-24 PROCEDURE — G8399 PT W/DXA RESULTS DOCUMENT: HCPCS | Performed by: UROLOGY

## 2025-01-24 PROCEDURE — G8420 CALC BMI NORM PARAMETERS: HCPCS | Performed by: UROLOGY

## 2025-01-24 PROCEDURE — 1111F DSCHRG MED/CURRENT MED MERGE: CPT | Performed by: UROLOGY

## 2025-01-24 PROCEDURE — 1126F AMNT PAIN NOTED NONE PRSNT: CPT | Performed by: UROLOGY

## 2025-01-24 PROCEDURE — 83615 LACTATE (LD) (LDH) ENZYME: CPT

## 2025-01-24 PROCEDURE — 3074F SYST BP LT 130 MM HG: CPT | Performed by: UROLOGY

## 2025-01-24 PROCEDURE — 83550 IRON BINDING TEST: CPT

## 2025-01-24 PROCEDURE — 82607 VITAMIN B-12: CPT

## 2025-01-24 PROCEDURE — 83540 ASSAY OF IRON: CPT

## 2025-01-24 PROCEDURE — 85025 COMPLETE CBC W/AUTO DIFF WBC: CPT

## 2025-01-24 PROCEDURE — 82746 ASSAY OF FOLIC ACID SERUM: CPT

## 2025-01-24 PROCEDURE — 4004F PT TOBACCO SCREEN RCVD TLK: CPT | Performed by: UROLOGY

## 2025-01-24 PROCEDURE — 1123F ACP DISCUSS/DSCN MKR DOCD: CPT | Performed by: UROLOGY

## 2025-01-24 PROCEDURE — 3078F DIAST BP <80 MM HG: CPT | Performed by: UROLOGY

## 2025-01-24 PROCEDURE — 99214 OFFICE O/P EST MOD 30 MIN: CPT | Performed by: UROLOGY

## 2025-01-24 PROCEDURE — 36415 COLL VENOUS BLD VENIPUNCTURE: CPT

## 2025-01-24 ASSESSMENT — PATIENT HEALTH QUESTIONNAIRE - PHQ9
SUM OF ALL RESPONSES TO PHQ9 QUESTIONS 1 & 2: 0
1. LITTLE INTEREST OR PLEASURE IN DOING THINGS: NOT AT ALL
SUM OF ALL RESPONSES TO PHQ QUESTIONS 1-9: 0
SUM OF ALL RESPONSES TO PHQ QUESTIONS 1-9: 0
2. FEELING DOWN, DEPRESSED OR HOPELESS: NOT AT ALL
SUM OF ALL RESPONSES TO PHQ QUESTIONS 1-9: 0
2. FEELING DOWN, DEPRESSED OR HOPELESS: NOT AT ALL
SUM OF ALL RESPONSES TO PHQ QUESTIONS 1-9: 0
1. LITTLE INTEREST OR PLEASURE IN DOING THINGS: NOT AT ALL
SUM OF ALL RESPONSES TO PHQ QUESTIONS 1-9: 0
SUM OF ALL RESPONSES TO PHQ QUESTIONS 1-9: 0
SUM OF ALL RESPONSES TO PHQ9 QUESTIONS 1 & 2: 0
SUM OF ALL RESPONSES TO PHQ QUESTIONS 1-9: 0
SUM OF ALL RESPONSES TO PHQ QUESTIONS 1-9: 0

## 2025-01-24 ASSESSMENT — ENCOUNTER SYMPTOMS
RESPIRATORY NEGATIVE: 1
GASTROINTESTINAL NEGATIVE: 1

## 2025-01-24 NOTE — PROGRESS NOTES
[facility]  [address] [place] [address]  Dariel Gardiner MD    Patient Name Maria Alejandra Castillo   MRN [id number]   Date 01/24/25     Hematology/Oncology Diagnosis     1. Muscle invasive urothelial carcinoma  2. 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 lady with DM2, anemia, anxiety, COPD, HTN, HLD, smoking history, remote SCC of vulva who presents to establish care.     - 12/23/24  Cystourethroscopy and large transurethral resection of bladder tumor, Dr. Lacey.  Pathology showed invasive urothelial carcinoma.  -1/5/2025 CT abdomen/pelvis at [facility].  Showed moderate to severe right hydroureteronephrosis with asymmetrically dilated right renal enhancement.  Dilation of right ureter to the level of right UVJ.  Indeterminate right adrenal nodule.  - 1/14/25  Cystoscopy, Dr Lipscomb.  A large amount of white fibrinous material throughout the upper portion of bladder observed, potentially consistent with fungus ball.  TURBT postponed.  Serna replaced.  - 1/15/24  Percutaneous nephrostomy tube placement.     Interval History  Presents with her family to establish care.  In wheelchair today.  At her baseline of health overall.  Has chronic indwelling Serna as well as percutaneous nephrostomy tube with clear, yellow urine.  No new cough, shortness of breath.    ROS   12 point review of system negative except as noted in HPI    Past Medical History:   Diagnosis Date    Anemia     oral Fe-- denies hx of blood transfusions    Anxiety     hx of anxiety    Cancer (HCC)  dx 12/2014    vulvar cancer- surgical removal-  \"contained\" - no further treatment    Class 1 obesity with body mass index (BMI) of 32.0 to 32.9 in adult 6/20/2018    COPD (chronic obstructive pulmonary disease) (HCC)     smoked x 50 yrs 1ppd-- inhalers daily and prn-    Diabetic polyneuropathy associated with type 2 diabetes mellitus (HCC) 10/4/2017    Elevated liver function tests

## 2025-01-24 NOTE — PATIENT INSTRUCTIONS
Patient Information from Today's Visit    The members of your Oncology Medical Home are listed below:    Physician Provider: Kyler Lipscomb, Urologic Oncologist  Advanced Practice Clinician: SRIKANTH Ghosh  Nurse Navigator: N/A  Medical Assistant: Meeta BARROW CMA  :Yue WAY  Supportive Care Services: Rick PINEDA LMSW    Diagnosis: Urothelial    Follow Up Instructions:   Treatment options discussed with medical oncology(Dr Gardiner)  We will place a referral to radiation oncology   We would like to do a repeat TURBT next Thursday. If you have any questions prior to the procedure please do not hesitate to call our office.      We discussed the natural history of bladder cancer, and the difference between muscle-invasive and non-muscle invasive urothelial cancer.  We discussed in detail the risks and benefits of transurethral resection of bladder tumor (TURBT).  We discussed the chance of bladder  perforation and need for open repair.  There is a small risk of damage to the urethra or ureteral orifices.  We discussed the risk of general anesthesia and other operative risks including but not limited to; MI, stroke, DVT/PE, bleeding, infection, pain/LUTS, tumor recurrence, other cardiovascular, pulmonary, neurologic, and renal complications.  A mederos catheter will likely be left in place after the procedure.  All of the patient's questions were answered and they wish to proceed with TURBT. We also discussed that we may perform retrograde pyelograms at the time of the procedure.    Treatment Summary has been discussed and given to patient:N/A      Current Labs:   No visits with results within 3 Day(s) from this visit.   Latest known visit with results is:   Admission on 01/14/2025, Discharged on 01/16/2025   Component Date Value Ref Range Status    POC Glucose 01/14/2025 277 (H)  65 - 100 mg/dL Final    Comment: 47 - 60 mg/dl Consistent with, but not fully diagnostic of hypoglycemia.  101 - 125 mg/dl

## 2025-01-24 NOTE — PATIENT INSTRUCTIONS
Patient Information from Today's Visit    The members of your Oncology Medical Home are listed below:    Physician Provider: Dr. Dariel Gardiner, Medical Oncologist  Advanced Practice Clinician: N/A  Registered Nurse: Cary ARGUETA RN  Nurse Navigator: Asher BARROW RN  Medical Assistant: Rell VALLADARES MA  : Brandi ART  Supportive Care Services: Rick PINEDA [facility]    Diagnosis: urothelial carcinoma    Follow Up Instructions:   Dr. Gardiner is suggesting low-dose chemotherapy along with radiation to treat your cancer.   PET/CT scan will be scheduled to determine plan of care.  A referral to radiation oncology (Dr. Sullivan) has been placed.  Blood work today.  2 week follow-up to review lab results and PET/CT scan results.    Treatment Summary has been discussed and given to patient: N/A      Current Labs: N/A        Please refer to After Visit Summary or MyChart for upcoming appointment information. Please call our office for rescheduling needs at least 24 hours before your scheduled appointment time.If you have any questions regarding your upcoming schedule please reach out to your care team through [employer] or call [phone].     Please notify your assigned Nurse Navigator of any unplanned hospital admissions or Emergency Room visits within 24 hours of discharge.    -------------------------------------------------------------------------------------------------------------------  Please call our office at [phone] if you have any  of the following symptoms:   Fever of 100.5 or greater  Chills  Shortness of breath  Swelling or pain in one leg    After office hours an answering service is available and will contact a provider for emergencies or if you are experiencing any of the above symptoms.        Cary Connor RN

## 2025-01-26 LAB — TRANSFERRIN SERPL-SCNC: 57.5 NMOL/L (ref 12.2–27.3)

## 2025-01-27 ENCOUNTER — CLINICAL DOCUMENTATION (OUTPATIENT)
Dept: ONCOLOGY | Age: 80
End: 2025-01-27

## 2025-01-27 RX ORDER — NICOTINE 21 MG/24HR
1 PATCH, TRANSDERMAL 24 HOURS TRANSDERMAL EVERY 24 HOURS
COMMUNITY

## 2025-01-27 NOTE — PERIOP NOTE
Phone pre-assessment completed.    Verified name & . Order to obtain consent not found in EHR, however patient verifies case posting.    Type 1B surgery,  assessment complete.  Orders not received.    Labs per surgeon: unknown  Labs per anesthesia protocol: HGB 10.0 , potassium 3.9  25    Patient answered medical/surgical history questions at their best of ability. All prior to admission medications documented in EPIC.    Patient instructed on the following and sent as message on [employer]. This RN also called the patient's son at her request to provide instructions, however received no answer. Voice mail left informing that all preop instructions are now available on [employer].    Thank you for completing your phone assessment with me today. The following is a list of Pre-op instructions that you requested. Should you have any questions, please call # [phone].    Surgery Date: 2025    Location: [place] [address] (enter at front entrance by [address]). Check in on the left at the Admissions office.   YOU WILL RECEIVE A CALL FROM A PREOP NURSE BY 4PM THE BUSINESS DAY PRIOR TO SURGERY. IF YOU HAVE NOT RECEIVED A CALL BY 4PM, YOU MAY THEN CALL THE NUMBER LISTED BELOW TO REQUEST THE ARRIVAL TIME. DO NOT CALL PRIOR TO 4PM the business day prior to your surgery date. (#[phone] MAIN Preop or Outpatient Center [phone]) If you have any questions on the day of surgery, please call the pre-op department at the telephone number listed.     No food after midnight the night before surgery. UPDATE: a recent change per Reagan RUBALCAVA, Please finish 32 ounces of non-caffeinated clear liquids 2 hours prior to their arrival to avoid dehydration (preferably water).  Once the clear fluids are completed do not chew gum, no hard candy, no mints, no additional fluids (Nothing in mouth including tobacco)     Please take ONLY the following

## 2025-01-29 ENCOUNTER — ANESTHESIA EVENT (OUTPATIENT)
Dept: SURGERY | Age: 80
End: 2025-01-29
Payer: MEDICARE

## 2025-01-30 ENCOUNTER — ANESTHESIA (OUTPATIENT)
Dept: SURGERY | Age: 80
End: 2025-01-30
Payer: MEDICARE

## 2025-01-30 ENCOUNTER — HOSPITAL ENCOUNTER (INPATIENT)
Age: 80
LOS: 1 days | Discharge: HOME | End: 2025-01-31
Attending: UROLOGY | Admitting: UROLOGY
Payer: MEDICARE

## 2025-01-30 DIAGNOSIS — C68.9 UROTHELIAL CARCINOMA (HCC): ICD-10-CM

## 2025-01-30 DIAGNOSIS — C67.9 UROTHELIAL CARCINOMA OF BLADDER WITH INVASION OF MUSCLE (HCC): ICD-10-CM

## 2025-01-30 DIAGNOSIS — Z98.890 STATUS POST CYSTOSCOPY: Primary | ICD-10-CM

## 2025-01-30 LAB
GLUCOSE BLD STRIP.AUTO-MCNC: 188 MG/DL (ref 65–100)
GLUCOSE BLD STRIP.AUTO-MCNC: 199 MG/DL (ref 65–100)
GLUCOSE BLD STRIP.AUTO-MCNC: 231 MG/DL (ref 65–100)
GLUCOSE BLD STRIP.AUTO-MCNC: 446 MG/DL (ref 65–100)
SERVICE CMNT-IMP: ABNORMAL

## 2025-01-30 PROCEDURE — 2500000003 HC RX 250 WO HCPCS: Performed by: PHYSICIAN ASSISTANT

## 2025-01-30 PROCEDURE — 2500000003 HC RX 250 WO HCPCS: Performed by: UROLOGY

## 2025-01-30 PROCEDURE — 3600000014 HC SURGERY LEVEL 4 ADDTL 15MIN: Performed by: UROLOGY

## 2025-01-30 PROCEDURE — 52240 CYSTOSCOPY AND TREATMENT: CPT | Performed by: UROLOGY

## 2025-01-30 PROCEDURE — 3600000004 HC SURGERY LEVEL 4 BASE: Performed by: UROLOGY

## 2025-01-30 PROCEDURE — 0TBB8ZZ EXCISION OF BLADDER, VIA NATURAL OR ARTIFICIAL OPENING ENDOSCOPIC: ICD-10-PCS | Performed by: UROLOGY

## 2025-01-30 PROCEDURE — 2709999900 HC NON-CHARGEABLE SUPPLY: Performed by: UROLOGY

## 2025-01-30 PROCEDURE — 6360000002 HC RX W HCPCS: Performed by: ANESTHESIOLOGY

## 2025-01-30 PROCEDURE — 1100000000 HC RM PRIVATE

## 2025-01-30 PROCEDURE — 6370000000 HC RX 637 (ALT 250 FOR IP): Performed by: UROLOGY

## 2025-01-30 PROCEDURE — 2580000003 HC RX 258: Performed by: UROLOGY

## 2025-01-30 PROCEDURE — 2720000010 HC SURG SUPPLY STERILE: Performed by: UROLOGY

## 2025-01-30 PROCEDURE — 7100000001 HC PACU RECOVERY - ADDTL 15 MIN: Performed by: UROLOGY

## 2025-01-30 PROCEDURE — 3700000000 HC ANESTHESIA ATTENDED CARE: Performed by: UROLOGY

## 2025-01-30 PROCEDURE — 3700000001 HC ADD 15 MINUTES (ANESTHESIA): Performed by: UROLOGY

## 2025-01-30 PROCEDURE — 82962 GLUCOSE BLOOD TEST: CPT

## 2025-01-30 PROCEDURE — 6360000002 HC RX W HCPCS: Performed by: NURSE ANESTHETIST, CERTIFIED REGISTERED

## 2025-01-30 PROCEDURE — 7100000000 HC PACU RECOVERY - FIRST 15 MIN: Performed by: UROLOGY

## 2025-01-30 PROCEDURE — 6370000000 HC RX 637 (ALT 250 FOR IP): Performed by: NURSE PRACTITIONER

## 2025-01-30 PROCEDURE — 6360000002 HC RX W HCPCS: Performed by: REGISTERED NURSE

## 2025-01-30 PROCEDURE — 2500000003 HC RX 250 WO HCPCS: Performed by: REGISTERED NURSE

## 2025-01-30 PROCEDURE — 88307 TISSUE EXAM BY PATHOLOGIST: CPT

## 2025-01-30 PROCEDURE — 6360000002 HC RX W HCPCS: Performed by: PHYSICIAN ASSISTANT

## 2025-01-30 RX ORDER — TRIAMTERENE AND HYDROCHLOROTHIAZIDE 37.5; 25 MG/1; MG/1
0.5 TABLET ORAL DAILY
Status: DISCONTINUED | OUTPATIENT
Start: 2025-01-30 | End: 2025-01-31 | Stop reason: HOSPADM

## 2025-01-30 RX ORDER — IBUPROFEN 600 MG/1
1 TABLET ORAL PRN
Status: DISCONTINUED | OUTPATIENT
Start: 2025-01-30 | End: 2025-01-31 | Stop reason: HOSPADM

## 2025-01-30 RX ORDER — SODIUM CHLORIDE 0.9 % (FLUSH) 0.9 %
5-40 SYRINGE (ML) INJECTION PRN
Status: DISCONTINUED | OUTPATIENT
Start: 2025-01-30 | End: 2025-01-31 | Stop reason: HOSPADM

## 2025-01-30 RX ORDER — LIDOCAINE HYDROCHLORIDE 20 MG/ML
INJECTION, SOLUTION EPIDURAL; INFILTRATION; INTRACAUDAL; PERINEURAL
Status: DISCONTINUED | OUTPATIENT
Start: 2025-01-30 | End: 2025-01-30 | Stop reason: SDUPTHER

## 2025-01-30 RX ORDER — DEXAMETHASONE SODIUM PHOSPHATE 4 MG/ML
INJECTION, SOLUTION INTRA-ARTICULAR; INTRALESIONAL; INTRAMUSCULAR; INTRAVENOUS; SOFT TISSUE
Status: DISCONTINUED | OUTPATIENT
Start: 2025-01-30 | End: 2025-01-30 | Stop reason: SDUPTHER

## 2025-01-30 RX ORDER — ASPIRIN 81 MG/1
81 TABLET ORAL DAILY
Status: DISCONTINUED | OUTPATIENT
Start: 2025-01-31 | End: 2025-01-31 | Stop reason: HOSPADM

## 2025-01-30 RX ORDER — SODIUM CHLORIDE, SODIUM LACTATE, POTASSIUM CHLORIDE, CALCIUM CHLORIDE 600; 310; 30; 20 MG/100ML; MG/100ML; MG/100ML; MG/100ML
INJECTION, SOLUTION INTRAVENOUS CONTINUOUS
Status: DISCONTINUED | OUTPATIENT
Start: 2025-01-30 | End: 2025-01-30

## 2025-01-30 RX ORDER — INSULIN LISPRO 100 [IU]/ML
2 INJECTION, SOLUTION INTRAVENOUS; SUBCUTANEOUS ONCE
Status: COMPLETED | OUTPATIENT
Start: 2025-01-30 | End: 2025-01-30

## 2025-01-30 RX ORDER — PROPOFOL 10 MG/ML
INJECTION, EMULSION INTRAVENOUS
Status: DISCONTINUED | OUTPATIENT
Start: 2025-01-30 | End: 2025-01-30 | Stop reason: SDUPTHER

## 2025-01-30 RX ORDER — ROCURONIUM BROMIDE 10 MG/ML
INJECTION, SOLUTION INTRAVENOUS
Status: DISCONTINUED | OUTPATIENT
Start: 2025-01-30 | End: 2025-01-30 | Stop reason: SDUPTHER

## 2025-01-30 RX ORDER — NEOSTIGMINE METHYLSULFATE 1 MG/ML
INJECTION, SOLUTION INTRAVENOUS
Status: DISCONTINUED | OUTPATIENT
Start: 2025-01-30 | End: 2025-01-30 | Stop reason: SDUPTHER

## 2025-01-30 RX ORDER — SODIUM CHLORIDE 9 MG/ML
INJECTION, SOLUTION INTRAVENOUS PRN
Status: DISCONTINUED | OUTPATIENT
Start: 2025-01-30 | End: 2025-01-30 | Stop reason: HOSPADM

## 2025-01-30 RX ORDER — ONDANSETRON 2 MG/ML
INJECTION INTRAMUSCULAR; INTRAVENOUS
Status: DISCONTINUED | OUTPATIENT
Start: 2025-01-30 | End: 2025-01-30 | Stop reason: SDUPTHER

## 2025-01-30 RX ORDER — GLYCOPYRROLATE 0.2 MG/ML
INJECTION INTRAMUSCULAR; INTRAVENOUS
Status: DISCONTINUED | OUTPATIENT
Start: 2025-01-30 | End: 2025-01-30 | Stop reason: SDUPTHER

## 2025-01-30 RX ORDER — LIDOCAINE HYDROCHLORIDE 10 MG/ML
1 INJECTION, SOLUTION INFILTRATION; PERINEURAL
Status: DISCONTINUED | OUTPATIENT
Start: 2025-01-30 | End: 2025-01-30 | Stop reason: HOSPADM

## 2025-01-30 RX ORDER — AMLODIPINE BESYLATE 5 MG/1
5 TABLET ORAL DAILY
Status: DISCONTINUED | OUTPATIENT
Start: 2025-01-30 | End: 2025-01-31 | Stop reason: HOSPADM

## 2025-01-30 RX ORDER — SODIUM CHLORIDE 0.9 % (FLUSH) 0.9 %
5-40 SYRINGE (ML) INJECTION PRN
Status: DISCONTINUED | OUTPATIENT
Start: 2025-01-30 | End: 2025-01-30 | Stop reason: HOSPADM

## 2025-01-30 RX ORDER — OXYCODONE HYDROCHLORIDE 5 MG/1
5 TABLET ORAL
Status: DISCONTINUED | OUTPATIENT
Start: 2025-01-30 | End: 2025-01-30 | Stop reason: HOSPADM

## 2025-01-30 RX ORDER — SODIUM CHLORIDE 0.9 % (FLUSH) 0.9 %
5-40 SYRINGE (ML) INJECTION EVERY 12 HOURS SCHEDULED
Status: DISCONTINUED | OUTPATIENT
Start: 2025-01-30 | End: 2025-01-30 | Stop reason: HOSPADM

## 2025-01-30 RX ORDER — SODIUM CHLORIDE 0.9 % (FLUSH) 0.9 %
5-40 SYRINGE (ML) INJECTION EVERY 12 HOURS SCHEDULED
Status: DISCONTINUED | OUTPATIENT
Start: 2025-01-30 | End: 2025-01-31 | Stop reason: HOSPADM

## 2025-01-30 RX ORDER — ONDANSETRON 2 MG/ML
4 INJECTION INTRAMUSCULAR; INTRAVENOUS
Status: DISCONTINUED | OUTPATIENT
Start: 2025-01-30 | End: 2025-01-30 | Stop reason: HOSPADM

## 2025-01-30 RX ORDER — OXYCODONE HYDROCHLORIDE 5 MG/1
5 TABLET ORAL EVERY 4 HOURS PRN
Status: DISCONTINUED | OUTPATIENT
Start: 2025-01-30 | End: 2025-01-31 | Stop reason: HOSPADM

## 2025-01-30 RX ORDER — DEXTROSE MONOHYDRATE 100 MG/ML
INJECTION, SOLUTION INTRAVENOUS CONTINUOUS PRN
Status: DISCONTINUED | OUTPATIENT
Start: 2025-01-30 | End: 2025-01-31 | Stop reason: HOSPADM

## 2025-01-30 RX ORDER — INSULIN LISPRO 100 [IU]/ML
0-8 INJECTION, SOLUTION INTRAVENOUS; SUBCUTANEOUS
Status: DISCONTINUED | OUTPATIENT
Start: 2025-01-30 | End: 2025-01-31 | Stop reason: HOSPADM

## 2025-01-30 RX ORDER — ONDANSETRON 2 MG/ML
4 INJECTION INTRAMUSCULAR; INTRAVENOUS EVERY 4 HOURS PRN
Status: DISCONTINUED | OUTPATIENT
Start: 2025-01-30 | End: 2025-01-31 | Stop reason: HOSPADM

## 2025-01-30 RX ORDER — SODIUM CHLORIDE, SODIUM LACTATE, POTASSIUM CHLORIDE, CALCIUM CHLORIDE 600; 310; 30; 20 MG/100ML; MG/100ML; MG/100ML; MG/100ML
INJECTION, SOLUTION INTRAVENOUS CONTINUOUS
Status: DISCONTINUED | OUTPATIENT
Start: 2025-01-30 | End: 2025-01-31 | Stop reason: HOSPADM

## 2025-01-30 RX ORDER — SENNA AND DOCUSATE SODIUM 50; 8.6 MG/1; MG/1
1 TABLET, FILM COATED ORAL 2 TIMES DAILY
Status: DISCONTINUED | OUTPATIENT
Start: 2025-01-30 | End: 2025-01-31 | Stop reason: HOSPADM

## 2025-01-30 RX ORDER — HYDROMORPHONE HYDROCHLORIDE 1 MG/ML
0.5 INJECTION, SOLUTION INTRAMUSCULAR; INTRAVENOUS; SUBCUTANEOUS EVERY 4 HOURS PRN
Status: DISCONTINUED | OUTPATIENT
Start: 2025-01-30 | End: 2025-01-31 | Stop reason: HOSPADM

## 2025-01-30 RX ORDER — ALBUTEROL SULFATE 0.83 MG/ML
2.5 SOLUTION RESPIRATORY (INHALATION)
Status: DISCONTINUED | OUTPATIENT
Start: 2025-01-30 | End: 2025-01-31

## 2025-01-30 RX ORDER — OXYBUTYNIN CHLORIDE 5 MG/1
5 TABLET ORAL 3 TIMES DAILY PRN
Status: DISCONTINUED | OUTPATIENT
Start: 2025-01-30 | End: 2025-01-31 | Stop reason: HOSPADM

## 2025-01-30 RX ORDER — NALOXONE HYDROCHLORIDE 0.4 MG/ML
INJECTION, SOLUTION INTRAMUSCULAR; INTRAVENOUS; SUBCUTANEOUS PRN
Status: DISCONTINUED | OUTPATIENT
Start: 2025-01-30 | End: 2025-01-30 | Stop reason: HOSPADM

## 2025-01-30 RX ADMIN — GLYCOPYRROLATE 0.4 MG: 0.2 INJECTION INTRAMUSCULAR; INTRAVENOUS at 15:07

## 2025-01-30 RX ADMIN — PROPOFOL 150 MG: 10 INJECTION, EMULSION INTRAVENOUS at 14:29

## 2025-01-30 RX ADMIN — Medication 3 MG: at 15:07

## 2025-01-30 RX ADMIN — HYDROMORPHONE HYDROCHLORIDE 0.5 MG: 0.5 INJECTION, SOLUTION INTRAMUSCULAR; INTRAVENOUS; SUBCUTANEOUS at 15:46

## 2025-01-30 RX ADMIN — SODIUM CHLORIDE, POTASSIUM CHLORIDE, SODIUM LACTATE AND CALCIUM CHLORIDE: 600; 310; 30; 20 INJECTION, SOLUTION INTRAVENOUS at 18:05

## 2025-01-30 RX ADMIN — TRIAMTERENE AND HYDROCHLOROTHIAZIDE 0.5 TABLET: 37.5; 25 TABLET ORAL at 18:09

## 2025-01-30 RX ADMIN — CEFAZOLIN 2000 MG: 2 INJECTION, POWDER, FOR SOLUTION INTRAMUSCULAR; INTRAVENOUS at 14:37

## 2025-01-30 RX ADMIN — ROCURONIUM BROMIDE 35 MG: 10 INJECTION, SOLUTION INTRAVENOUS at 14:29

## 2025-01-30 RX ADMIN — AMLODIPINE BESYLATE 5 MG: 5 TABLET ORAL at 18:09

## 2025-01-30 RX ADMIN — DEXAMETHASONE SODIUM PHOSPHATE 4 MG: 4 INJECTION, SOLUTION INTRAMUSCULAR; INTRAVENOUS at 15:05

## 2025-01-30 RX ADMIN — LIDOCAINE HYDROCHLORIDE 100 MG: 20 INJECTION, SOLUTION EPIDURAL; INFILTRATION; INTRACAUDAL; PERINEURAL at 14:29

## 2025-01-30 RX ADMIN — INSULIN LISPRO 8 UNITS: 100 INJECTION, SOLUTION INTRAVENOUS; SUBCUTANEOUS at 21:25

## 2025-01-30 RX ADMIN — DOCUSATE SODIUM 50 MG AND SENNOSIDES 8.6 MG 1 TABLET: 8.6; 5 TABLET, FILM COATED ORAL at 21:27

## 2025-01-30 RX ADMIN — ONDANSETRON 4 MG: 2 INJECTION INTRAMUSCULAR; INTRAVENOUS at 15:05

## 2025-01-30 RX ADMIN — SODIUM CHLORIDE, PRESERVATIVE FREE 5 ML: 5 INJECTION INTRAVENOUS at 21:27

## 2025-01-30 RX ADMIN — INSULIN LISPRO 2 UNITS: 100 INJECTION, SOLUTION INTRAVENOUS; SUBCUTANEOUS at 22:26

## 2025-01-30 ASSESSMENT — PAIN DESCRIPTION - DESCRIPTORS
DESCRIPTORS: BURNING;SHARP;STABBING
DESCRIPTORS: BURNING

## 2025-01-30 ASSESSMENT — PAIN - FUNCTIONAL ASSESSMENT
PAIN_FUNCTIONAL_ASSESSMENT: 0-10

## 2025-01-30 ASSESSMENT — PAIN DESCRIPTION - LOCATION: LOCATION: PERINEUM

## 2025-01-30 ASSESSMENT — PAIN SCALES - GENERAL: PAINLEVEL_OUTOF10: 10

## 2025-01-30 ASSESSMENT — LIFESTYLE VARIABLES: SMOKING_STATUS: 1

## 2025-01-30 ASSESSMENT — COPD QUESTIONNAIRES: CAT_SEVERITY: MILD

## 2025-01-30 NOTE — OP NOTE
OPERATIVE REPORT    Maria Alejandra Castillo    [id number]     01/30/25        PREOPERATIVE DIAGNOSIS: Bladder cancer.     POSTOPERATIVE DIAGNOSIS: Bladder cancer.     PROCEDURES:  1. Cystoscopy  2. Transurethral resection of bladder tumor (large)       SURGEON:  Kyler Lipscomb     ASSISTANT:  None     ANESTHESIA: General anesthesia.     ANTIBIOTICS:  Ancef     LINES, DRAINS, AND TUBES: 20 Fr mederos cathter with 10ml in balloon     COMPLICATIONS:  None.     SPECIMENS:     ID Type Source Tests Collected by Time Destination   A : BLADDER TUMOR RIGHT FLOOR AND SIDE WALL Tissue Bladder SURGICAL PATHOLOGY Kyler Lipscomb MD 1/30/2025 1506         ESTIMATED BLOOD LOSS:  <10cc.     INDICATIONS:  Patient has a bladder lesion. Patient presents to the OR today for TURBT.      FINDINGS: No sign of infection as previous.  Large tumor involving over half of the bladder floor extending across the right trigone and up the right sidewall.  I resected an area that was about 7 cm in diameter.  I was not able to find the either the right or left UO.        NUMBER OF TUMORS: one  SIZE OF LARGEST TUMOR: 8 cm  TUMOR CHARACTERISTICS: See above  RECURRENT VERSUS PRIMARY TUMOR: Primary, but second resection  BIMANUAL EXAM: normal; bladder somewhat mobile; still palpable mass even after resection, but less so   VISUALLY COMPLETE RESECTION: No (approx 80-90% resected)  VISUALIZATION OF DETRUSOR MUSCLE IN RESECTION BED: Yes  CONCERN FOR PERFORATION: No     DESCRIPTION OF PROCEDURE: Patient was brought back to the operating room, placed in supine position, and general anesthesia was then induced. The patient was then moved into proper position on the bed and placed in low lithotomy utilizing stirrups, padding all pressure points. Genitals were prepped and draped in usual sterile fashion. A time-out was completed verifying patient and anticipated procedure. Yue-operative antibiotics were administered. A 26-Sudanese resectoscope was then

## 2025-01-30 NOTE — PERIOP NOTE
TRANSFER - OUT REPORT:    Verbal report given to Mary Ann on Maria Alejandra Castillo  being transferred to [facility] for routine post-op       Report consisted of patient’s Situation, Background, Assessment and   Recommendations(SBAR).     Information from the following report(s) Surgery Report, Intake/Output, and MAR was reviewed with the receiving nurse.    Lines:   Peripheral IV 01/30/25 Distal;Posterior;Right Forearm (Active)   Site Assessment Clean, dry & intact 01/30/25 1702   Line Status Capped;Flushed 01/30/25 1702   Line Care Connections checked and tightened 01/30/25 1525   Phlebitis Assessment No symptoms 01/30/25 1525   Infiltration Assessment 0 01/30/25 1525   Alcohol Cap Used No 01/30/25 1525   Dressing Status Clean, dry & intact 01/30/25 1525   Dressing Type Transparent 01/30/25 1525   Dressing Intervention New 01/30/25 1407        Opportunity for questions and clarification was provided.          VTE prophylaxis orders have been written for Maria Alejandra Castillo.    Patient and family given floor number and nurses name.  Family updated re: pt status after security code verified.

## 2025-01-30 NOTE — ANESTHESIA PRE PROCEDURE
98.1 °F (36.7 °C)   SpO2:  97%   Weight: 54.4 kg (120 lb) 54.4 kg (120 lb)   Height: 1.499 m (4' 11\") 1.499 m (4' 11\")                                              BP Readings from Last 3 Encounters:   01/30/25 (!) 176/74   01/24/25 120/75   01/24/25 120/75       NPO Status:                                                                                 BMI:   Wt Readings from Last 3 Encounters:   01/30/25 54.4 kg (120 lb)   01/24/25 54.4 kg (120 lb)   01/24/25 54.4 kg (120 lb)     Body mass index is 24.24 kg/m².    CBC:   Lab Results   Component Value Date/Time    WBC 10.2 01/24/2025 11:42 AM    RBC 3.81 01/24/2025 11:42 AM    HGB 10.0 01/24/2025 11:42 AM    HCT 32.4 01/24/2025 11:42 AM    MCV 85.0 01/24/2025 11:42 AM    RDW 18.9 01/24/2025 11:42 AM     01/24/2025 11:42 AM       CMP:   Lab Results   Component Value Date/Time     01/24/2025 11:42 AM    K 3.9 01/24/2025 11:42 AM    CL 98 01/24/2025 11:42 AM    CO2 22 01/24/2025 11:42 AM    BUN 28 01/24/2025 11:42 AM    CREATININE 1.01 01/24/2025 11:42 AM    GFRAA 94 09/17/2020 08:47 AM    LABGLOM 57 01/24/2025 11:42 AM    LABGLOM >60 10/03/2022 12:07 PM    GLUCOSE 147 01/24/2025 11:42 AM    CALCIUM 10.2 01/24/2025 11:42 AM    BILITOT <0.2 01/24/2025 11:42 AM    ALKPHOS 87 01/24/2025 11:42 AM    ALKPHOS 60 09/17/2020 08:47 AM    AST 21 01/24/2025 11:42 AM    ALT 19 01/24/2025 11:42 AM       POC Tests:   Recent Labs     01/30/25  1359   POCGLU 188*       Coags: No results found for: \"PROTIME\", \"INR\", \"APTT\"    HCG (If Applicable): No results found for: \"PREGTESTUR\", \"PREGSERUM\", \"HCG\", \"HCGQUANT\"     ABGs: No results found for: \"PHART\", \"PO2ART\", \"VRK8UDZ\", \"IZX1XWX\", \"BEART\", \"K2PCNHQY\"     Type & Screen (If Applicable):  Lab Results   Component Value Date    ABORH A POSITIVE 06/13/2019    LABANTI NEG 06/13/2019       Drug/Infectious Status (If Applicable):  No results found for: \"HIV\", \"HEPCAB\"    COVID-19 Screening (If Applicable): No results found for:

## 2025-01-30 NOTE — ANESTHESIA POSTPROCEDURE EVALUATION
Department of Anesthesiology  Postprocedure Note    Patient: Maria Alejandra Castillo  MRN: [id number]  YOB: 1945  Date of evaluation: 1/30/2025    Procedure Summary       Date: 01/30/25 Room / Location:  MAIN OR 02 /  MAIN OR    Anesthesia Start: 1418 Anesthesia Stop: 1525    Procedure: CYSTOSCOPY TRANSURETHRAL RESECTION BLADDER (Bladder) Diagnosis:       Urothelial carcinoma (HCC)      (Urothelial carcinoma (HCC) [C68.9])    Providers: Kyler Lipscomb MD Responsible Provider: Kyler Huddleston Jr., MD    Anesthesia Type: General ASA Status: 3            Anesthesia Type: General    Juan Antonio Phase I: Juan Antonio Score: 10    Juan Antonio Phase II: Juan Antonio Score: 9    Anesthesia Post Evaluation    Patient location during evaluation: PACU  Patient participation: complete - patient participated  Level of consciousness: awake  Pain score: 0  Airway patency: patent  Nausea & Vomiting: no nausea and no vomiting  Cardiovascular status: blood pressure returned to baseline and hemodynamically stable  Respiratory status: acceptable, spontaneous ventilation and nonlabored ventilation  Hydration status: euvolemic  Multimodal analgesia pain management approach  Pain management: adequate    No notable events documented.

## 2025-01-30 NOTE — PROGRESS NOTES
TRANSFER - IN REPORT:    Verbal report received from KENDRA Santos on Maria Alejandra Castillo  being received from PACU for routine progression of patient care      Report consisted of patient's Situation, Background, Assessment and   Recommendations(SBAR).     Information from the following report(s) Nurse Handoff Report was reviewed with the receiving nurse.    Opportunity for questions and clarification was provided.      Assessment completed upon patient's arrival to unit and care assumed.

## 2025-01-30 NOTE — ANESTHESIA PROCEDURE NOTES
Airway  Date/Time: 1/30/2025 2:31 PM  Urgency: elective    Airway not difficult    General Information and Staff    Patient location during procedure: OR  Resident/CRNA: Maury Latham APRN - CRNA  Performed: resident/CRNA   Performed by: Luis Angel Metz APRN - CRNA  Authorized by: Kyler Huddleston Jr., MD      Indications and Patient Condition  Indications for airway management: anesthesia  Spontaneous Ventilation: absent  Sedation level: deep  Preoxygenated: yes  Patient position: sniffing  MILS not maintained throughout  Mask difficulty assessment: vent by bag mask    Final Airway Details  Final airway type: endotracheal airway      Successful airway: ETT  Cuffed: yes   Successful intubation technique: direct laryngoscopy  Facilitating devices/methods: intubating stylet and cricoid pressure  Endotracheal tube insertion site: oral  Blade: Igor  Blade size: #3  ETT size (mm): 7.0  Cormack-Lehane Classification: grade I - full view of glottis  Placement verified by: chest auscultation and capnometry   Measured from: lips  ETT to lips (cm): 21  Number of attempts at approach: 1  Ventilation between attempts: bag mask  Number of other approaches attempted: 0

## 2025-01-30 NOTE — PROGRESS NOTES
4 Eyes Skin Assessment     NAME:  Maria Alejandra Castillo  YOB: 1945  MEDICAL RECORD NUMBER:  [id number]    The patient is being assessed for  Admission    I agree that at least one RN has performed a thorough Head to Toe Skin Assessment on the patient. ALL assessment sites listed below have been assessed.      Areas assessed by both nurses:    Head, Face, Ears, Shoulders, Back, Chest, Arms, Elbows, Hands, Sacrum. Buttock, Coccyx, Ischium, Legs. Feet and Heels, and Under Medical Devices         Does the Patient have a Wound? No noted wound(s)       Apolinar Prevention initiated by RN: Yes  Wound Care Orders initiated by RN: No    Pressure Injury (Stage 3,4, Unstageable, DTI, NWPT, and Complex wounds) if present, place Wound referral order by RN under : No    New Ostomies, if present place, Ostomy referral order under : No     Nurse 1 eSignature: Electronically signed by Mary Ann Dumont RN on 1/30/25 at 5:50 PM EST    **SHARE this note so that the co-signing nurse can place an eSignature**    Nurse 2 eSignature: Electronically signed by JOSEFA GAMBINO RN on 1/30/25 at 7:00 PM EST

## 2025-01-31 VITALS
OXYGEN SATURATION: 97 % | BODY MASS INDEX: 24.19 KG/M2 | WEIGHT: 120 LBS | HEART RATE: 74 BPM | DIASTOLIC BLOOD PRESSURE: 60 MMHG | RESPIRATION RATE: 18 BRPM | HEIGHT: 59 IN | TEMPERATURE: 97.5 F | SYSTOLIC BLOOD PRESSURE: 125 MMHG

## 2025-01-31 LAB
ANION GAP SERPL CALC-SCNC: 11 MMOL/L (ref 7–16)
BUN SERPL-MCNC: 20 MG/DL (ref 8–23)
CALCIUM SERPL-MCNC: 9 MG/DL (ref 8.8–10.2)
CHLORIDE SERPL-SCNC: 97 MMOL/L (ref 98–107)
CO2 SERPL-SCNC: 26 MMOL/L (ref 20–29)
CREAT SERPL-MCNC: 1.01 MG/DL (ref 0.6–1.1)
ERYTHROCYTE [DISTWIDTH] IN BLOOD BY AUTOMATED COUNT: 18.4 % (ref 11.9–14.6)
GLUCOSE BLD STRIP.AUTO-MCNC: 256 MG/DL (ref 65–100)
GLUCOSE BLD STRIP.AUTO-MCNC: 264 MG/DL (ref 65–100)
GLUCOSE BLD STRIP.AUTO-MCNC: 333 MG/DL (ref 65–100)
GLUCOSE SERPL-MCNC: 272 MG/DL (ref 70–99)
HCT VFR BLD AUTO: 28.3 % (ref 35.8–46.3)
HGB BLD-MCNC: 8.8 G/DL (ref 11.7–15.4)
MCH RBC QN AUTO: 26.1 PG (ref 26.1–32.9)
MCHC RBC AUTO-ENTMCNC: 31.1 G/DL (ref 31.4–35)
MCV RBC AUTO: 84 FL (ref 82–102)
NRBC # BLD: 0 K/UL (ref 0–0.2)
PLATELET # BLD AUTO: 408 K/UL (ref 150–450)
PMV BLD AUTO: 11.7 FL (ref 9.4–12.3)
POTASSIUM SERPL-SCNC: 4.5 MMOL/L (ref 3.5–5.1)
RBC # BLD AUTO: 3.37 M/UL (ref 4.05–5.2)
SERVICE CMNT-IMP: ABNORMAL
SODIUM SERPL-SCNC: 134 MMOL/L (ref 136–145)
WBC # BLD AUTO: 7.3 K/UL (ref 4.3–11.1)

## 2025-01-31 PROCEDURE — 2500000003 HC RX 250 WO HCPCS: Performed by: UROLOGY

## 2025-01-31 PROCEDURE — 80048 BASIC METABOLIC PNL TOTAL CA: CPT

## 2025-01-31 PROCEDURE — 6370000000 HC RX 637 (ALT 250 FOR IP): Performed by: UROLOGY

## 2025-01-31 PROCEDURE — 99024 POSTOP FOLLOW-UP VISIT: CPT | Performed by: PHYSICIAN ASSISTANT

## 2025-01-31 PROCEDURE — 36415 COLL VENOUS BLD VENIPUNCTURE: CPT

## 2025-01-31 PROCEDURE — 85027 COMPLETE CBC AUTOMATED: CPT

## 2025-01-31 PROCEDURE — 82962 GLUCOSE BLOOD TEST: CPT

## 2025-01-31 RX ORDER — HYDROCODONE BITARTRATE AND ACETAMINOPHEN 5; 325 MG/1; MG/1
1 TABLET ORAL EVERY 4 HOURS PRN
Qty: 10 TABLET | Refills: 0 | Status: SHIPPED | OUTPATIENT
Start: 2025-01-31 | End: 2025-02-03

## 2025-01-31 RX ORDER — ASPIRIN 81 MG/1
81 TABLET ORAL DAILY
Qty: 30 TABLET | Refills: 3 | Status: SHIPPED | OUTPATIENT
Start: 2025-02-03

## 2025-01-31 RX ORDER — ALBUTEROL SULFATE 0.83 MG/ML
2.5 SOLUTION RESPIRATORY (INHALATION) EVERY 6 HOURS PRN
Status: DISCONTINUED | OUTPATIENT
Start: 2025-01-31 | End: 2025-01-31 | Stop reason: HOSPADM

## 2025-01-31 RX ADMIN — TRIAMTERENE AND HYDROCHLOROTHIAZIDE 0.5 TABLET: 37.5; 25 TABLET ORAL at 08:43

## 2025-01-31 RX ADMIN — INSULIN LISPRO 4 UNITS: 100 INJECTION, SOLUTION INTRAVENOUS; SUBCUTANEOUS at 08:45

## 2025-01-31 RX ADMIN — INSULIN LISPRO 4 UNITS: 100 INJECTION, SOLUTION INTRAVENOUS; SUBCUTANEOUS at 11:58

## 2025-01-31 RX ADMIN — DOCUSATE SODIUM 50 MG AND SENNOSIDES 8.6 MG 1 TABLET: 8.6; 5 TABLET, FILM COATED ORAL at 08:43

## 2025-01-31 RX ADMIN — AMLODIPINE BESYLATE 5 MG: 5 TABLET ORAL at 08:44

## 2025-01-31 RX ADMIN — ASPIRIN 81 MG: 81 TABLET, COATED ORAL at 08:43

## 2025-01-31 RX ADMIN — SODIUM CHLORIDE, PRESERVATIVE FREE 10 ML: 5 INJECTION INTRAVENOUS at 08:47

## 2025-01-31 ASSESSMENT — PAIN SCALES - GENERAL: PAINLEVEL_OUTOF10: 0

## 2025-01-31 NOTE — PROGRESS NOTES
Urology Progress Note    Admit Date: 1/30/2025    Subjective:     Patient has no new complaints. UOP clear in mederos and perc tube.     Objective:     Patient Vitals for the past 8 hrs:   BP Temp Temp src Pulse Resp SpO2   01/31/25 0745 125/60 97.5 °F (36.4 °C) Oral 74 18 97 %   01/31/25 0310 (!) 125/56 97 °F (36.1 °C) Oral 68 19 96 %     01/31 0701 - 01/31 1900  In: -   Out: 1100 [Urine:1100]  01/29 1901 - 01/31 0700  In: -   Out: 252 [Urine:250]    Physical Exam:     Awake, alert, and oriented  Lungs no JVD.  Breathing is  non-labored; no audible wheezing.    Heart regular, normal perfusion  Abd soft, nt, nd  UOP clear      Data Review   Recent Results (from the past 24 hour(s))   POCT Glucose    Collection Time: 01/30/25  1:59 PM   Result Value Ref Range    POC Glucose 188 (H) 65 - 100 mg/dL    Performed by: Coretta    POCT Glucose    Collection Time: 01/30/25  3:52 PM   Result Value Ref Range    POC Glucose 199 (H) 65 - 100 mg/dL    Performed by: Naz    POCT Glucose    Collection Time: 01/30/25  6:08 PM   Result Value Ref Range    POC Glucose 231 (H) 65 - 100 mg/dL    Performed by: Claudia    POCT Glucose    Collection Time: 01/30/25  8:16 PM   Result Value Ref Range    POC Glucose 446 (H) 65 - 100 mg/dL    Performed by: Maritza    Basic Metabolic Panel    Collection Time: 01/31/25  4:07 AM   Result Value Ref Range    Sodium 134 (L) 136 - 145 mmol/L    Potassium 4.5 3.5 - 5.1 mmol/L    Chloride 97 (L) 98 - 107 mmol/L    CO2 26 20 - 29 mmol/L    Anion Gap 11 7 - 16 mmol/L    Glucose 272 (H) 70 - 99 mg/dL    BUN 20 8 - 23 MG/DL    Creatinine 1.01 0.60 - 1.10 MG/DL    Est, Glom Filt Rate 57 (L) >60 ml/min/1.73m2    Calcium 9.0 8.8 - 10.2 MG/DL   CBC    Collection Time: 01/31/25  4:07 AM   Result Value Ref Range    WBC 7.3 4.3 - 11.1 K/uL    RBC 3.37 (L) 4.05 - 5.2 M/uL    Hemoglobin 8.8 (L) 11.7 - 15.4 g/dL    Hematocrit 28.3 (L) 35.8 - 46.3 %    MCV 84.0 82 - 102 FL    MCH 26.1 26.1 -

## 2025-01-31 NOTE — CARE COORDINATION
CM met with Ms. Castillo in room 611 to discuss discharge planning.  She is inpatient status POD #1 TURBT.  She had a previous TURBT 12/23/2024, and was here for a clot evacuation 1/14 to 1/16.       Prior to this procedure, she was living at home alone in [place], [place] and fairly independent with ADLs, except that she stopped driving in December 2024.  Her son Zev lives nearby, and her brother Yogesh lives in [place], [place].  Yogesh will be here later to help her out some.       At discharge, she would like to return home, and resume home health with [facility].  Resumption of Care (MAG) order and referral for home health OT, PT, and SN sent via Epic to [facility].  No other discharge needs voiced or identified, but CM will follow along and assist as needed.       01/31/25 0751   Service Assessment   Patient Orientation Alert and Oriented   Cognition Alert   History Provided By Patient   Primary Caregiver Self   Support Systems Children;Family Members   PCP Verified by CM Yes   Prior Functional Level Independent in ADLs/IADLs;Assistance with the following:;Shopping   Current Functional Level Independent in ADLs/IADLs;Assistance with the following:;Mobility;Shopping;Other (see comment)  (routine post-op activity limitations)   Can patient return to prior living arrangement Yes   Ability to make needs known: Good   Family able to assist with home care needs: Yes   Would you like for me to discuss the discharge plan with any other family members/significant others, and if so, who? No   Condition of Participation: Discharge Planning   The Plan for Transition of Care is related to the following treatment goals: home when stable, resume [facility] home [facility]

## 2025-01-31 NOTE — DISCHARGE SUMMARY
Discharge Summary    Date: 1/31/2025  Patient Name: Maria Alejandra Castillo    YOB: 1945     Age: 79 y.o.    Admit Date: 1/30/2025  Discharge Date: 1/31/2025  Discharge Condition: Stable    Admission Diagnosis  Urothelial carcinoma (HCC) [C68.9];Status post cystoscopy [Z98.890]      Discharge Diagnosis  Principal Problem:    Urothelial carcinoma (HCC)  Active Problems:    Status post cystoscopy  Resolved Problems:    * No resolved hospital problems. *      Hospital Stay  Narrative of Hospital Course:  The patient underwent transurethral resection of bladder tumor. Procedure was without complications. Following the procedure, patient was transferred to PACU and admitted to the urology floor for convalescence. Postoperatively, the patient progressed as expected. Pt discharged with mederos and R nephrostomy tube.      Consultants:  [facility]    Surgeries/procedures Performed:  transurethral resection of bladder tumor     Treatments:    Analgesia, Antibiotics, IV Hydration and Surgery        Discharge Plan/Disposition:  Home    Hospital/Incidental Findings Requiring Follow Up:    Patient Instructions:    Diet: Diabetic Diet    Activity:No Heavy Lifting  For number of days (if applicable):      Other Instructions:    Provider Follow-Up:   No follow-ups on file.     Significant Diagnostic Studies:    Recent Labs:  Admission on 01/30/2025  POC Glucose                                   Date: 01/30/2025  Value: 188 (H)     Ref range: 65 - 100 mg/dL     Status: Final                Comment: 47 - 60 mg/dl Consistent with, but not fully diagnostic of hypoglycemia.  101 - 125 mg/dl Impaired fasting glucose/consistent with pre-diabetes mellitus  > 126 mg/dl Fasting glucose consistent with overt diabetes mellitus    Performed by:                                 Date: 01/30/2025  Value: Coretta                       Status: Final  POC Glucose                                   Date: 01/30/2025  Value:  pre-diabetes mellitus  > 126 mg/dl Fasting glucose consistent with overt diabetes mellitus    Performed by:                                 Date: 01/31/2025  Value: FabricePCT                       Status: Final  POC Glucose                                   Date: 01/31/2025  Value: 264 (H)     Ref range: 65 - 100 mg/dL     Status: Final                Comment: 47 - 60 mg/dl Consistent with, but not fully diagnostic of hypoglycemia.  101 - 125 mg/dl Impaired fasting glucose/consistent with pre-diabetes mellitus  > 126 mg/dl Fasting glucose consistent with overt diabetes mellitus    Performed by:                                 Date: 01/31/2025  Value: TinyasheraPCT                       Status: Final  ------------    Radiology last 7 days:  No results found.     Pending Labs     Order Current Status    Surgical Pathology Collected (01/30/25 4609)        Discharge Medications    Current Discharge Medication List    START taking these medications    HYDROcodone-acetaminophen (NORCO) 5-325 MG per tablet  Take 1 tablet by mouth every 4 hours as needed for Pain for up to 3 days. Intended supply: 3 days. Take lowest dose possible to manage pain Max Daily Amount: 6 tablets  Qty: 10 tablet Refills: 0  Comments: Reduce doses taken as pain becomes manageable  Associated Diagnoses:Urothelial carcinoma of bladder with invasion of muscle (HCC)          Current Discharge Medication List    CONTINUE these medications which have CHANGED    aspirin 81 MG EC tablet  Take 1 tablet by mouth daily Indications: preventive  Qty: 30 tablet Refills: 3          Current Discharge Medication List    CONTINUE these medications which have NOT CHANGED    nicotine (NICODERM CQ) 21 MG/24HR  Place 1 patch onto the skin every 24 hours    oxyBUTYnin (DITROPAN) 5 MG tablet  Take 1 tablet by mouth 3 times daily as needed (Bladder spams)  Qty: 30 tablet Refills: 1    insulin glargine (LANTUS SOLOSTAR) 100 UNIT/ML injection pen  Inject 16 Units into

## 2025-01-31 NOTE — ONCOLOGY
START ON PATHWAY REGIMEN - Bladder    [id number]          Gemcitabine             Gemcitabine     **Always confirm dose/schedule in your pharmacy ordering system**    Patient Characteristics:  Pre-Cystectomy or Nonsurgical Candidate, M0 (Clinical Staging), cT2-4a, cN0-1, M0, Cystectomy Ineligible  Therapeutic Status: Pre-Cystectomy or Nonsurgical Candidate, M0 (Clinical Staging)  AJCC M Category: cM0  AJCC 8 Stage Grouping: II  AJCC T Category: cT2  AJCC N Category: cN0

## 2025-01-31 NOTE — NURSE NAVIGATOR
This RN Navigator at bedside,introduced to patient.  Patient informed that this RN Navigator will be following them at least 30 days post discharge to act as a resource for any needs that may arise in relation to their COPD diagnosis and treatment.      Patient states understanding of COPD disease process.  COPD action plan discussed.  Patient states understanding. Discussed triggers, precautions, managing stress and breathing techniques with patient.      Patient verbalized understanding of importance of COPD zone tool, handwashing, following up with PCP within 7 days of discharge, as well as seeing other specialists regularly and as needed. Patient states she does not use oxygen at home but has an inhaler. Per the patient report, she currently smokes 3-4 cigarettes a day. Patient encouraged to cut back or quit by this RN Navigator. Patient states she has smoked since she was 19 years old. Patient states there are no pets in the household.    COPD educational booklet given to patient along with this RN Navigator's card.  Patient informed that this RN Navigator will be contacting them within 48-72 hours of discharge. Contact information verified by patient.      Patient states she is to be discharged today. RN Navigator will continue to follow.

## 2025-02-03 ENCOUNTER — FOLLOWUP TELEPHONE ENCOUNTER (OUTPATIENT)
Dept: CASE MANAGEMENT | Age: 80
End: 2025-02-03

## 2025-02-03 NOTE — TELEPHONE ENCOUNTER
Follow up appointment made with PCP for patient. Attempted to call patient back, no answer and unable to leave a voicemail. Will attempt to reach patient again tomorrow.

## 2025-02-03 NOTE — PROGRESS NOTES
RN navigator spoke to patient by phone for follow-up. Patient states she needs a follow up appointment made with her PCP. This RN Navigator will make PCP appointment for patient and notify patient of details. Patient states understanding.    Patient states no exacerbation of symptoms at this time.     Medications reviewed with patient and patient verbalized understanding of how to administer medications. Patient states current adequate access to medications and refills. No need for assistance obtaining medications at this time.      Patient seems to be compliant with plan of care and education provided.     Patient encouraged to keep/make routine appointments with PCP and keep vaccinations current. This RN Navigator will continue to follow.

## 2025-02-04 ENCOUNTER — FOLLOWUP TELEPHONE ENCOUNTER (OUTPATIENT)
Dept: CASE MANAGEMENT | Age: 80
End: 2025-02-04

## 2025-02-04 NOTE — PROGRESS NOTES
This RN Navigator spoke with patient son. Follow up appointment needs cancelled. Son not able to take patient to that appointment.  This RN Navigator called PCP and cancelled appointment. Patient rescheduled for 2/13/25 @ 2pm with Madeline Srivastava. This RN Navigator informed son, son states he will take patient to follow up. This RN Navigator will continue to follow.

## 2025-02-04 NOTE — PROGRESS NOTES
This RN Navigator spoke with patient by phone for follow up. Informed her this RN Navigator made an appointment with PCP for 2/6 @ 01:30pm. Patient states she does not drive and has to check with her son to see if he is available because he take her to her appointment.  Patient states she will call this RN Navigator back to confirm or cancel. This RN Navigator will follow up.

## 2025-02-05 ENCOUNTER — HOSPITAL ENCOUNTER (OUTPATIENT)
Dept: PET IMAGING | Age: 80
Discharge: HOME | End: 2025-02-08

## 2025-02-05 DIAGNOSIS — C68.9 UROTHELIAL CARCINOMA (HCC): ICD-10-CM

## 2025-02-05 DIAGNOSIS — D64.9 ANEMIA, UNSPECIFIED TYPE: ICD-10-CM

## 2025-02-11 ENCOUNTER — FOLLOWUP TELEPHONE ENCOUNTER (OUTPATIENT)
Dept: CASE MANAGEMENT | Age: 80
End: 2025-02-11

## 2025-02-11 NOTE — PROGRESS NOTES
RN navigator spoke to patient by phone for follow-up.    Patient states no exacerbation of symptoms at this time.   Patient states she's getting around all over the house in her walker because she wants to get better.    Patient states current adequate access to medications and refills. No need for assistance obtaining medications at this time.      Patient seems to be compliant with plan of care and education provided.     Patient encouraged to keep/make routine appointments with PCP and keep vaccinations current. This RN Navigator will continue to follow.

## 2025-02-13 ENCOUNTER — HOSPITAL ENCOUNTER (OUTPATIENT)
Dept: PET IMAGING | Age: 80
Discharge: HOME | End: 2025-02-16
Payer: MEDICARE

## 2025-02-13 LAB
GLUCOSE BLD STRIP.AUTO-MCNC: 220 MG/DL (ref 65–100)
GLUCOSE BLD STRIP.AUTO-MCNC: 226 MG/DL (ref 65–100)
SERVICE CMNT-IMP: ABNORMAL
SERVICE CMNT-IMP: ABNORMAL

## 2025-02-13 PROCEDURE — 82962 GLUCOSE BLOOD TEST: CPT

## 2025-02-18 ENCOUNTER — CLINICAL DOCUMENTATION (OUTPATIENT)
Dept: ONCOLOGY | Age: 80
End: 2025-02-18

## 2025-02-18 ENCOUNTER — OFFICE VISIT (OUTPATIENT)
Dept: ONCOLOGY | Age: 80
End: 2025-02-18
Payer: MEDICARE

## 2025-02-18 ENCOUNTER — HOSPITAL ENCOUNTER (OUTPATIENT)
Dept: RADIATION ONCOLOGY | Age: 80
Setting detail: RECURRING SERIES
Discharge: HOME | End: 2025-02-21
Payer: MEDICARE

## 2025-02-18 ENCOUNTER — FOLLOWUP TELEPHONE ENCOUNTER (OUTPATIENT)
Dept: CASE MANAGEMENT | Age: 80
End: 2025-02-18

## 2025-02-18 VITALS
RESPIRATION RATE: 16 BRPM | WEIGHT: 123 LBS | OXYGEN SATURATION: 98 % | BODY MASS INDEX: 24.84 KG/M2 | HEART RATE: 77 BPM | TEMPERATURE: 97.8 F | DIASTOLIC BLOOD PRESSURE: 53 MMHG | SYSTOLIC BLOOD PRESSURE: 123 MMHG

## 2025-02-18 VITALS
SYSTOLIC BLOOD PRESSURE: 130 MMHG | WEIGHT: 123.2 LBS | BODY MASS INDEX: 24.84 KG/M2 | HEART RATE: 71 BPM | OXYGEN SATURATION: 96 % | DIASTOLIC BLOOD PRESSURE: 59 MMHG | RESPIRATION RATE: 13 BRPM | HEIGHT: 59 IN | TEMPERATURE: 97.6 F

## 2025-02-18 DIAGNOSIS — C68.9 UROTHELIAL CARCINOMA (HCC): Primary | ICD-10-CM

## 2025-02-18 DIAGNOSIS — D50.0 IRON DEFICIENCY ANEMIA DUE TO CHRONIC BLOOD LOSS: ICD-10-CM

## 2025-02-18 PROCEDURE — G8399 PT W/DXA RESULTS DOCUMENT: HCPCS | Performed by: INTERNAL MEDICINE

## 2025-02-18 PROCEDURE — 1123F ACP DISCUSS/DSCN MKR DOCD: CPT | Performed by: INTERNAL MEDICINE

## 2025-02-18 PROCEDURE — 1090F PRES/ABSN URINE INCON ASSESS: CPT | Performed by: INTERNAL MEDICINE

## 2025-02-18 PROCEDURE — 1126F AMNT PAIN NOTED NONE PRSNT: CPT | Performed by: INTERNAL MEDICINE

## 2025-02-18 PROCEDURE — 3075F SYST BP GE 130 - 139MM HG: CPT | Performed by: INTERNAL MEDICINE

## 2025-02-18 PROCEDURE — G8420 CALC BMI NORM PARAMETERS: HCPCS | Performed by: INTERNAL MEDICINE

## 2025-02-18 PROCEDURE — 99214 OFFICE O/P EST MOD 30 MIN: CPT | Performed by: INTERNAL MEDICINE

## 2025-02-18 PROCEDURE — 3078F DIAST BP <80 MM HG: CPT | Performed by: INTERNAL MEDICINE

## 2025-02-18 PROCEDURE — G8428 CUR MEDS NOT DOCUMENT: HCPCS | Performed by: INTERNAL MEDICINE

## 2025-02-18 PROCEDURE — 1111F DSCHRG MED/CURRENT MED MERGE: CPT | Performed by: INTERNAL MEDICINE

## 2025-02-18 PROCEDURE — 99211 OFF/OP EST MAY X REQ PHY/QHP: CPT

## 2025-02-18 PROCEDURE — 1036F TOBACCO NON-USER: CPT | Performed by: INTERNAL MEDICINE

## 2025-02-18 RX ORDER — ONDANSETRON 2 MG/ML
8 INJECTION INTRAMUSCULAR; INTRAVENOUS
Status: CANCELLED | OUTPATIENT
Start: 2025-03-11

## 2025-02-18 RX ORDER — SODIUM CHLORIDE 9 MG/ML
5-250 INJECTION, SOLUTION INTRAVENOUS PRN
Status: CANCELLED | OUTPATIENT
Start: 2025-03-11

## 2025-02-18 RX ORDER — EPINEPHRINE 1 MG/ML
0.3 INJECTION, SOLUTION, CONCENTRATE INTRAVENOUS PRN
Status: CANCELLED | OUTPATIENT
Start: 2025-03-11

## 2025-02-18 RX ORDER — DIPHENHYDRAMINE HYDROCHLORIDE 50 MG/ML
50 INJECTION INTRAMUSCULAR; INTRAVENOUS
Status: CANCELLED | OUTPATIENT
Start: 2025-03-11

## 2025-02-18 RX ORDER — FAMOTIDINE 10 MG/ML
20 INJECTION, SOLUTION INTRAVENOUS
Status: CANCELLED | OUTPATIENT
Start: 2025-03-11

## 2025-02-18 RX ORDER — ALBUTEROL SULFATE 90 UG/1
4 INHALANT RESPIRATORY (INHALATION) PRN
Status: CANCELLED | OUTPATIENT
Start: 2025-03-11

## 2025-02-18 RX ORDER — HYDROCORTISONE SODIUM SUCCINATE 100 MG/2ML
100 INJECTION INTRAMUSCULAR; INTRAVENOUS
Status: CANCELLED | OUTPATIENT
Start: 2025-03-11

## 2025-02-18 RX ORDER — HEPARIN SODIUM (PORCINE) LOCK FLUSH IV SOLN 100 UNIT/ML 100 UNIT/ML
500 SOLUTION INTRAVENOUS PRN
Status: CANCELLED | OUTPATIENT
Start: 2025-03-11

## 2025-02-18 RX ORDER — SODIUM CHLORIDE 0.9 % (FLUSH) 0.9 %
5-40 SYRINGE (ML) INJECTION PRN
Status: CANCELLED | OUTPATIENT
Start: 2025-03-11

## 2025-02-18 RX ORDER — ACETAMINOPHEN 325 MG/1
650 TABLET ORAL
Status: CANCELLED | OUTPATIENT
Start: 2025-03-11

## 2025-02-18 RX ORDER — SODIUM CHLORIDE 9 MG/ML
INJECTION, SOLUTION INTRAVENOUS CONTINUOUS
Status: CANCELLED | OUTPATIENT
Start: 2025-03-11

## 2025-02-18 ASSESSMENT — PATIENT HEALTH QUESTIONNAIRE - PHQ9
SUM OF ALL RESPONSES TO PHQ QUESTIONS 1-9: 0
SUM OF ALL RESPONSES TO PHQ9 QUESTIONS 1 & 2: 0
SUM OF ALL RESPONSES TO PHQ QUESTIONS 1-9: 0
2. FEELING DOWN, DEPRESSED OR HOPELESS: NOT AT ALL
1. LITTLE INTEREST OR PLEASURE IN DOING THINGS: NOT AT ALL

## 2025-02-18 NOTE — PROGRESS NOTES
RN navigator spoke to patient by phone for follow-up. Patient states she is tired from her doctors appointments today.     Patient states no exacerbation of symptoms at this time.      Patient seems to be compliant with plan of care and education provided.      Patient encouraged to keep/make routine appointments with PCP and keep vaccinations current.      This RN Navigator will continue to follow.

## 2025-02-18 NOTE — PATIENT INSTRUCTIONS
Patient Information from Today's Visit    The members of your [facility] are listed below:    Physician Provider: Dr. Dariel Gardiner, Medical Oncologist  Advanced Practice Clinician: N/A  Registered Nurse: Cary ARGUETA RN  Nurse Navigator: Saba HANDY RN  Medical Assistant: Rell VALLADARES MA  : My [employer]   Supportive Care Services: Rick PINEDA LMSW    Diagnosis: urothelial carcinoma    Follow Up Instructions: 3 weeks follow-up with labs    A PET/CT scan is recommended. You can call to schedule this at [phone].   IR will contact you to schedule your port placement   Chemo education will be provided prior to starting chemotherapy       Treatment Summary has been discussed and given to patient: yes      Agent Information: Chemotherapy      Diagnosis: urothelial carcinoma     Goal of Therapy:  _ Palliative      _X_Curative         Name of medication(s): gemcitabine    Number of Cycles Planned: 8                  Length of Cycle:  every 3-4 days    Chemotherapy plan is subject to change at your provider's discretion.    A copy of this plan has been discussed and given to pt by RN.    Education Needed: Yes   Scheduled Date: TBD  Education Materials Given (eating hints, chemotherapy and you, chemotherapy education and self-care guidelines): Yes    Education Previously completed: No  Date:  N/A    Drug Materials: given      Date Given: 2/18/25  Patient opts to receive education materials via [employer]: No    Consent for therapy:   Not completed at this time                   Current Labs: N/A          Please refer to After Visit Summary or MyChart for upcoming appointment information. Please call our office for rescheduling needs at least 24 hours before your scheduled appointment time.If you have any questions regarding your upcoming schedule please reach out to your care team through [employer] or call [phone].     Please notify your assigned Nurse Navigator of any unplanned hospital admissions or

## 2025-02-18 NOTE — PROGRESS NOTES
EBRT Education:  Maria Alejandra Castillo  is a 79 y.o. year old female with Urothelial Carcinoma bladder cancer who presents for Radiation Therapy education.     The patient was given handouts for diagnosis, planned treatment and potential side effects, Pelvic Radiation and its side effects, frequently asked questions, skin care, and vitamin recommendations for reference.      Self-care guidelines were distributed and discussed with the patient and included the followin) Potential long term and short term side effects of therapy including fertility risks for appropriate patients    2) Symptoms and side effects that require the patient to contact [facility] or require immediate attention    3) The [facility]'s contact information with availability and instructions on who and when to call    4) The [facility] appointment policy and expectations for rescheduling or canceling    Patient scheduled for CT-Sim, female pelvis, full and empty bladder    Patient asked appropriate questions and was involved and engaged during the educational session.  There were no barriers to learning that were observed or demonstrated during the encounter.  Preference in learning style assessed as visual, written and verbal.  Patient acknowledged readiness to learn by responding appropriately to open ended questions about radiation therapy education, disease process, treatment plan and possible side effects, etc.  Patient offered feedback on learning and the educational encounter.  Patient acknowledges the importance of and agrees to adhering to the treatment plan.    Time was provided for the patient to ask questions.  Maria Alejandra Castillo verbalized understanding of the treatment plan.    Patient denies previous RT treatment  Patient denies Pacemaker  Patient denies collagen vascular dx  Patient denies pain, 0/10    Vitals:    Vitals:    25 1106   BP: (!) 123/53 
care is potentially curative.  Maria Alejandra Castillo had the opportunity to ask questions and has elected to proceed with treatment.    PLAN:    Consented patient for treatment with external beam radiation after discussing risk, benefits, and side effects from treatment.  Reviewed available research treatment and cancer care protocols for which patient may be eligible.  Unfortunately there are no matching clinical trials available at this time.    CT simulation to be scheduled and treatment to start shortly thereafter.  4)   Pain plan: Pain is not present.    I spent a total of 65 minutes today performing the following care related activities for Maria Alejandra Castillo: Face to face exam/evaluation, /Educate Patient/Caregivers, Pre-Charting/Documenting after the visit, Interpreting/Ordering Meds, Tests, Procedures, and Discuss/Coordinate condition with providers (Dr. Gardiner)    Rene Kennedy MD   February 18, 2025

## 2025-02-18 NOTE — PROGRESS NOTES
Sw unable to see during appointment.  Pt appt was at 11:45 however pt checked in at 12:12.  Sw will plan to meet during infusion appointment.

## 2025-02-18 NOTE — PROGRESS NOTES
[facility]  [address] [place] [address]  Dariel Gardiner MD    Patient Name Maria Alejandra Castillo   MRN [id number]   Date 02/19/25     Hematology/Oncology Diagnosis     1. Muscle invasive urothelial carcinoma  2. 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 lady with DM2, anemia, anxiety, COPD, HTN, HLD, smoking history, remote SCC of vulva who presents for follow-up.    - 12/23/24  Cystourethroscopy and large transurethral resection of bladder tumor, Dr. Lacey.  Pathology showed invasive urothelial carcinoma.  -1/5/2025 CT abdomen/pelvis at [facility].  Showed moderate to severe right hydroureteronephrosis with asymmetrically dilated right renal enhancement.  Dilation of right ureter to the level of right UVJ.  Indeterminate right adrenal nodule.  - 1/14/25  Cystoscopy, Dr Lipscomb.  A large amount of white fibrinous material throughout the upper portion of bladder observed, potentially consistent with fungus ball.  TURBT postponed.  Serna replaced.  - 1/15/24  Percutaneous nephrostomy tube placement.   - 1/30/24  TURBT, Dr Lipscomb.  Cystoscopy showed no signs of infection.  Large tumor involving over half of the bladder floor extending across the right trigone and up to the right sidewall observed.  Area of resection about 7 cm.    Interval History  Presents for scheduled follow-up.  With her granddaughter today.  Underwent TURBT on 1/30, recovered well.  Unable to have PET/CT completed due to elevated blood glucose levels.  BG is better controlled now, 130s this morning.  Met with Dr. Kennedy/peña bell earlier today.  No new complaints.    ROS   12 point review of system negative except as noted in HPI    Past Medical History:   Diagnosis Date    Anemia     oral Fe--per patient 1/14/25 recent blood transfusion    Anxiety     hx of anxiety    Cancer (HCC)  dx 12/2014    vulvar cancer- surgical removal-  \"contained\" - no further treatment    Class 1

## 2025-02-19 ENCOUNTER — HOSPITAL ENCOUNTER (OUTPATIENT)
Dept: RADIATION ONCOLOGY | Age: 80
Setting detail: RECURRING SERIES
Discharge: HOME | End: 2025-02-22
Payer: MEDICARE

## 2025-02-19 RX ORDER — HEPARIN SODIUM (PORCINE) LOCK FLUSH IV SOLN 100 UNIT/ML 100 UNIT/ML
500 SOLUTION INTRAVENOUS PRN
OUTPATIENT
Start: 2025-02-22

## 2025-02-19 RX ORDER — SODIUM CHLORIDE 9 MG/ML
5-250 INJECTION, SOLUTION INTRAVENOUS PRN
OUTPATIENT
Start: 2025-02-22

## 2025-02-19 RX ORDER — SODIUM CHLORIDE 0.9 % (FLUSH) 0.9 %
5-40 SYRINGE (ML) INJECTION PRN
OUTPATIENT
Start: 2025-02-22

## 2025-02-19 RX ORDER — SODIUM CHLORIDE 9 MG/ML
INJECTION, SOLUTION INTRAVENOUS CONTINUOUS
OUTPATIENT
Start: 2025-02-22

## 2025-02-19 RX ORDER — EPINEPHRINE 1 MG/ML
0.3 INJECTION, SOLUTION, CONCENTRATE INTRAVENOUS PRN
OUTPATIENT
Start: 2025-02-22

## 2025-02-19 RX ORDER — DIPHENHYDRAMINE HYDROCHLORIDE 50 MG/ML
50 INJECTION INTRAMUSCULAR; INTRAVENOUS
OUTPATIENT
Start: 2025-02-22

## 2025-02-19 RX ORDER — FAMOTIDINE 10 MG/ML
20 INJECTION, SOLUTION INTRAVENOUS
OUTPATIENT
Start: 2025-02-22

## 2025-02-19 RX ORDER — ACETAMINOPHEN 325 MG/1
650 TABLET ORAL
OUTPATIENT
Start: 2025-02-22

## 2025-02-19 RX ORDER — HYDROCORTISONE SODIUM SUCCINATE 100 MG/2ML
100 INJECTION INTRAMUSCULAR; INTRAVENOUS
OUTPATIENT
Start: 2025-02-22

## 2025-02-19 RX ORDER — ALBUTEROL SULFATE 90 UG/1
4 INHALANT RESPIRATORY (INHALATION) PRN
OUTPATIENT
Start: 2025-02-22

## 2025-02-19 RX ORDER — ONDANSETRON 2 MG/ML
8 INJECTION INTRAMUSCULAR; INTRAVENOUS
OUTPATIENT
Start: 2025-02-22

## 2025-02-24 ENCOUNTER — FOLLOWUP TELEPHONE ENCOUNTER (OUTPATIENT)
Dept: CASE MANAGEMENT | Age: 80
End: 2025-02-24

## 2025-02-24 ENCOUNTER — TELEPHONE (OUTPATIENT)
Dept: ONCOLOGY | Age: 80
End: 2025-02-24

## 2025-02-24 NOTE — TELEPHONE ENCOUNTER
Called pt's son, Zev, to discuss pt's upcoming appts and transportation to these appts. Pt informed me that he is the pt's caregiver and works full-time from 3p-3a. He is overwhelmed with all of her upcoming appts. He is requesting an earlier time for her IV iron infusion on 3/1. Informed Zev (son) that I will communicate with the schedulers to inquire about an earlier time. He verbalized understanding and was appreciative for the call.

## 2025-02-24 NOTE — PROGRESS NOTES
This RN Navigator attempted to reach patient by phone for weekly follow up call. No answer, VM not set up, unable to leave message. Will attempt to reach patient again by phone.     This RN Navigator will continue to follow.

## 2025-02-25 ENCOUNTER — APPOINTMENT (OUTPATIENT)
Dept: RADIATION ONCOLOGY | Age: 80
End: 2025-02-25
Payer: MEDICARE

## 2025-02-25 NOTE — PROGRESS NOTES
weeks for an overall check and how she is doing with her treatments and a Mederos catheter exchange.      We discussed in detail the risks and benefits of cystoscopy and stent exchange and possible turbt.  We discussed the chance of bladder perforation or ureteral damage and need for open repair or percutaneous nephrostomy tube placement.  There is a small risk of damage to the urethra, ureteral orifices, or ureter which may result in stricture.  We discussed risk of urinary infection/sepsis. We discussed the risk of general anesthesia and other operative risks including but not limited to; MI, stroke, DVT/PE, bleeding, infection, pain/LUTS, other cardiovascular, pulmonary, neurologic, and renal complications.  We explained that as with any surgical procedure there is a small chance of death.  A mederos catheter may be left in place after the procedure.  All of the patient's questions were answered and they wish to proceed.  We carefully explained the stent will have to be removed or replaced in the next 3-4 months and cannot remain in the patient or it will cause significant complications.  Patient acknowledged the need for urologic follow-up for stent removal or continued management.          PLAN:   -Mederos exchanged today    -RTC in 4 weeks for mederos exchange  -Proceed with with right stent exchange w/ possible TURBT in 3 months  ________________________________________      I have seen and examined this patient.  I have reviewed and edited the note started by the MA and agree with the outlined plan.  Part of this note was written by using a voice dictation software. The note has been proof read but may still contain some grammatical/other typographical errors.      Milo Lipscomb MD  Urologic Oncology  [facility]

## 2025-02-26 ENCOUNTER — HOSPITAL ENCOUNTER (OUTPATIENT)
Dept: RADIATION ONCOLOGY | Age: 80
Setting detail: RECURRING SERIES
Discharge: HOME | End: 2025-03-01
Payer: MEDICARE

## 2025-02-26 ENCOUNTER — HOSPITAL ENCOUNTER (OUTPATIENT)
Dept: PET IMAGING | Age: 80
Discharge: HOME | End: 2025-03-01
Payer: MEDICARE

## 2025-02-26 LAB
GLUCOSE BLD STRIP.AUTO-MCNC: 163 MG/DL (ref 65–100)
SERVICE CMNT-IMP: ABNORMAL

## 2025-02-26 PROCEDURE — 3430000000 HC RX DIAGNOSTIC RADIOPHARMACEUTICAL: Performed by: INTERNAL MEDICINE

## 2025-02-26 PROCEDURE — 6360000004 HC RX CONTRAST MEDICATION: Performed by: INTERNAL MEDICINE

## 2025-02-26 PROCEDURE — 78815 PET IMAGE W/CT SKULL-THIGH: CPT

## 2025-02-26 PROCEDURE — 82962 GLUCOSE BLOOD TEST: CPT

## 2025-02-26 PROCEDURE — 2500000003 HC RX 250 WO HCPCS: Performed by: INTERNAL MEDICINE

## 2025-02-26 PROCEDURE — A9609 HC RX DIAGNOSTIC RADIOPHARMACEUTICAL: HCPCS | Performed by: INTERNAL MEDICINE

## 2025-02-26 RX ORDER — DIATRIZOATE MEGLUMINE AND DIATRIZOATE SODIUM 660; 100 MG/ML; MG/ML
10 SOLUTION ORAL; RECTAL
Status: DISCONTINUED | OUTPATIENT
Start: 2025-02-26 | End: 2025-03-02 | Stop reason: HOSPADM

## 2025-02-26 RX ORDER — SODIUM CHLORIDE 0.9 % (FLUSH) 0.9 %
20 SYRINGE (ML) INJECTION AS NEEDED
Status: DISCONTINUED | OUTPATIENT
Start: 2025-02-26 | End: 2025-03-02 | Stop reason: HOSPADM

## 2025-02-26 RX ORDER — FLUDEOXYGLUCOSE F 18 200 MCI/ML
13.4 INJECTION, SOLUTION INTRAVENOUS
Status: COMPLETED | OUTPATIENT
Start: 2025-02-26 | End: 2025-02-26

## 2025-02-26 RX ADMIN — DIATRIZOATE MEGLUMINE AND DIATRIZOATE SODIUM 10 ML: 660; 100 LIQUID ORAL; RECTAL at 12:08

## 2025-02-26 RX ADMIN — SODIUM CHLORIDE, PRESERVATIVE FREE 20 ML: 5 INJECTION INTRAVENOUS at 12:08

## 2025-02-26 RX ADMIN — FLUDEOXYGLUCOSE F 18 13.4 MILLICURIE: 200 INJECTION, SOLUTION INTRAVENOUS at 12:08

## 2025-02-27 ENCOUNTER — HOSPITAL ENCOUNTER (OUTPATIENT)
Dept: INTERVENTIONAL RADIOLOGY/VASCULAR | Age: 80
Discharge: HOME | End: 2025-03-01
Attending: RADIOLOGY
Payer: MEDICARE

## 2025-02-27 VITALS
HEART RATE: 64 BPM | BODY MASS INDEX: 24.8 KG/M2 | RESPIRATION RATE: 16 BRPM | WEIGHT: 123 LBS | SYSTOLIC BLOOD PRESSURE: 114 MMHG | HEIGHT: 59 IN | OXYGEN SATURATION: 97 % | TEMPERATURE: 97.7 F | DIASTOLIC BLOOD PRESSURE: 55 MMHG

## 2025-02-27 LAB
GLUCOSE BLD STRIP.AUTO-MCNC: 209 MG/DL (ref 65–100)
SERVICE CMNT-IMP: ABNORMAL

## 2025-02-27 PROCEDURE — 82962 GLUCOSE BLOOD TEST: CPT

## 2025-02-27 PROCEDURE — C1769 GUIDE WIRE: HCPCS

## 2025-02-27 PROCEDURE — 6360000004 HC RX CONTRAST MEDICATION: Performed by: RADIOLOGY

## 2025-02-27 PROCEDURE — 6360000002 HC RX W HCPCS: Performed by: RADIOLOGY

## 2025-02-27 PROCEDURE — 50435 EXCHANGE NEPHROSTOMY CATH: CPT

## 2025-02-27 RX ORDER — OXYCODONE HYDROCHLORIDE 5 MG/1
10 TABLET ORAL EVERY 4 HOURS PRN
Status: DISCONTINUED | OUTPATIENT
Start: 2025-02-27 | End: 2025-03-02 | Stop reason: HOSPADM

## 2025-02-27 RX ORDER — OXYCODONE HYDROCHLORIDE 5 MG/1
5 TABLET ORAL EVERY 4 HOURS PRN
Status: DISCONTINUED | OUTPATIENT
Start: 2025-02-27 | End: 2025-03-02 | Stop reason: HOSPADM

## 2025-02-27 RX ORDER — DIPHENHYDRAMINE HYDROCHLORIDE 50 MG/ML
INJECTION INTRAMUSCULAR; INTRAVENOUS PRN
Status: COMPLETED | OUTPATIENT
Start: 2025-02-27 | End: 2025-02-27

## 2025-02-27 RX ORDER — MIDAZOLAM HYDROCHLORIDE 2 MG/2ML
INJECTION, SOLUTION INTRAMUSCULAR; INTRAVENOUS PRN
Status: COMPLETED | OUTPATIENT
Start: 2025-02-27 | End: 2025-02-27

## 2025-02-27 RX ORDER — FENTANYL CITRATE 50 UG/ML
INJECTION, SOLUTION INTRAMUSCULAR; INTRAVENOUS PRN
Status: COMPLETED | OUTPATIENT
Start: 2025-02-27 | End: 2025-02-27

## 2025-02-27 RX ORDER — LIDOCAINE HYDROCHLORIDE 20 MG/ML
INJECTION, SOLUTION INFILTRATION; PERINEURAL PRN
Status: COMPLETED | OUTPATIENT
Start: 2025-02-27 | End: 2025-02-27

## 2025-02-27 RX ADMIN — LIDOCAINE HYDROCHLORIDE 5 ML: 20 INJECTION, SOLUTION INFILTRATION; PERINEURAL at 13:43

## 2025-02-27 RX ADMIN — MIDAZOLAM HYDROCHLORIDE 0.5 MG: 1 INJECTION, SOLUTION INTRAMUSCULAR; INTRAVENOUS at 13:42

## 2025-02-27 RX ADMIN — FENTANYL CITRATE 50 MCG: 50 INJECTION, SOLUTION INTRAMUSCULAR; INTRAVENOUS at 13:38

## 2025-02-27 RX ADMIN — MIDAZOLAM HYDROCHLORIDE 0.5 MG: 1 INJECTION, SOLUTION INTRAMUSCULAR; INTRAVENOUS at 13:47

## 2025-02-27 RX ADMIN — FENTANYL CITRATE 25 MCG: 50 INJECTION, SOLUTION INTRAMUSCULAR; INTRAVENOUS at 13:42

## 2025-02-27 RX ADMIN — MIDAZOLAM HYDROCHLORIDE 1 MG: 1 INJECTION, SOLUTION INTRAMUSCULAR; INTRAVENOUS at 13:38

## 2025-02-27 RX ADMIN — IOHEXOL 15 ML: 300 INJECTION, SOLUTION INTRAVENOUS at 13:53

## 2025-02-27 RX ADMIN — DIPHENHYDRAMINE HYDROCHLORIDE 25 MG: 50 INJECTION, SOLUTION INTRAMUSCULAR; INTRAVENOUS at 13:42

## 2025-02-27 RX ADMIN — FENTANYL CITRATE 25 MCG: 50 INJECTION, SOLUTION INTRAMUSCULAR; INTRAVENOUS at 13:47

## 2025-02-27 ASSESSMENT — PAIN SCALES - GENERAL
PAINLEVEL_OUTOF10: 0
PAINLEVEL_OUTOF10: 0

## 2025-02-27 ASSESSMENT — PAIN - FUNCTIONAL ASSESSMENT: PAIN_FUNCTIONAL_ASSESSMENT: NONE - DENIES PAIN

## 2025-02-27 NOTE — H&P
[facility]  Department of Interventional Radiology  [phone]                                 History & Physical Examination Note      Patient:  Maria Alejandra Castillo MRN:  [id number]  SSN:  [id number]    YOB: 1945  Age:  79 y.o.  Sex:  female      Primary Care Provider:  Elva Izaguirre DO  Referring Physician:  Moisés Cintron MD    Subjective:     Chief Complaint: R Ureter obstruction    History of the Present Illness:  The patient is a 79 y.o. female who presents with a R PCN.  Serna draining clear urine.  No fever.  NPO.  .      Past Medical History:   Diagnosis Date    Anemia     oral Fe--per patient 1/14/25 recent blood transfusion    Anxiety     hx of anxiety    Cancer (HCC)  dx 12/2014    vulvar cancer- surgical removal-  \"contained\" - no further treatment    Class 1 obesity with body mass index (BMI) of 32.0 to 32.9 in adult 6/20/2018    COPD (chronic obstructive pulmonary disease) (HCC)     smoked x 50+ yrs 1ppd--\"tryin to quit\" wearing nicotine patch, does not require supplemental O2, inhalers daily and prn-    Diabetic polyneuropathy associated with type 2 diabetes mellitus (HCC) 10/4/2017    Elevated liver function tests 10/8/2015    GERD (gastroesophageal reflux disease)     daily med, 2 pillows,    Heart murmur     states dx \"when I was 21 yrs old\"-- not heard since- denies chest pain or syncope    History of blood transfusion 01/2025    HTN (hypertension)     managed  with med    Hyperlipidemia     Obesity (BMI 30-39.9)     BMI 32.9    GHADA (obstructive sleep apnea)     \"I used to use a CPAP a long time ago, but I can't tolerate it\"    Smoking     1 pk/day x 50+ years \"Trying to quit\"    Type 2 diabetes mellitus (HCC)     SQBS normal 150, hgba1c 7.2 on 12/25/2024 no hypo symptoms , wears CGM    Urothelial cancer (HCC)     Dx 2024    Vitamin B12 deficiency 11/22/2016    Vitamin D deficiency 6/30/2015     Past Surgical History:   Procedure Laterality

## 2025-02-27 NOTE — DISCHARGE INSTRUCTIONS
If you have any questions about your procedure, please call the Interventional Radiology department at [phone].   After business hours (5pm) and weekends, call the answering service at [phone] and ask for the Radiologist on call to be paged.     Si tiene Preguntas acerca del procedimiento, por favor llame al departamento de Radiología Intervencional al [phone].   Después de horas de oficina (5 pm) y los fines de semana, llamar al servicio de llamadas al [phone] y pregunte por el Radiologo de florencia.      Interventional Radiology General Nurse Discharge    After general anesthesia or intravenous sedation, for 24 hours or while taking prescription Narcotics:  Limit your activities  Do not drive and operate hazardous machinery  Do not make important personal or business decisions  Do  not drink alcoholic beverages  If you have not urinated within 8 hours after discharge, please contact your surgeon on call.    * Please give a list of your current medications to your Primary Care Provider.  * Please update this list whenever your medications are discontinued, doses are     changed, or new medications (including over-the-counter products) are added.  * Please carry medication information at all times in case of emergency situations.    These are general instructions for a healthy lifestyle:    No smoking/ No tobacco products/ Avoid exposure to second hand smoke  Surgeon General's Warning:  Quitting smoking now greatly reduces serious risk to your health.    Obesity, smoking, and sedentary lifestyle greatly increases your risk for illness  A healthy diet, regular physical exercise & weight monitoring are important for maintaining a healthy lifestyle    You may be retaining fluid if you have a history of heart failure or if you experience any of the following symptoms:  Weight gain of 3 pounds or more overnight or 5 pounds in a week, increased swelling in our hands or feet or shortness of breath

## 2025-02-27 NOTE — BRIEF OP NOTE
[facility]  Department of Interventional Radiology  [phone]        Brief Procedure Note    Patient: Maria Alejandra Castillo MRN: [id number]  SSN: [id number]    YOB: 1945  Age: 79 y.o.  Sex: female      Date of Procedure: 2/27/2025     Pre-Procedure Diagnosis:   Bladder cancer w R Ureter obstruction.      Post-Procedure Diagnosis:  SAME    Procedure(s):  Anterograde nephrostogram, ureter recanalization, Ureteral Stent placement    Brief Description of Procedure:  as above    Performed By:  ANGEL ROJO MD     Assistants:  None    Anesthesia:  Moderate Sedation    Estimated Blood Loss:  None    Specimens:  None    Implants:  8.5 Fr, 24 cm, Double J Ureteral stent.      Findings:  Resolved hydronephrosis/hydroureter.  No blood clots.      Complications:  None    Recommendations:  Dressing change PRN.       Follow Up:  I will Staff Message Dr Lipscomb for stent follow up.      Signed By: ANGEL ROJO MD     February 27, 2025

## 2025-02-27 NOTE — OR NURSING
TRANSFER - OUT REPORT:           Verbal report given to KENDRA Hernandez(name) on Maria Alejandra Castillo  being transferred to  Recovery 4(unit) for  routine post-op            Report consisted of patient’s Situation, Background, Assessment and      Recommendations(SBAR).          Information from the following report(s) SBAR, Procedure Summary, and MAR was reviewed with the receiving nurse.       Opportunity for questions and clarification was provided.          Conscious Sedation:    100 Mcg of Fentanyl administered   2 Mg of Versed administered   25 Mg of Benadryl administered        Pt tolerated procedure well.      Peripheral Intravenous Line:   Peripheral IV 02/27/25 Left Forearm (Active)       VITALS:  /61   Pulse 70   Temp 97.7 °F (36.5 °C) (Infrared)   Resp 16   Ht 1.499 m (4' 11\")   Wt 55.8 kg (123 lb)   SpO2 99%   BMI 24.84 kg/m²

## 2025-02-27 NOTE — PRE SEDATION
Sedation Pre-Procedure Note    Patient Name: Maria Alejandra Castillo   YOB: 1945  Room/Bed: Room/bed info not found  Medical Record Number: [id number]  Date: 2/27/2025   Time: 1:26 PM       Indication:  Bladder cancer w ureter obstruction    Consent: I have discussed with the patient and/or the patient representative the indication, alternatives, and the possible risks and/or complications of the planned procedure and the anesthesia methods. The patient and/or patient representative appear to understand and agree to proceed.  Son present throughout.     Vital Signs:   Vitals:    02/27/25 1223   BP: 138/63   Pulse: 80   Resp: 18   Temp: 97.7 °F (36.5 °C)   SpO2: 98%       Past Medical History:   has a past medical history of Anemia, Anxiety, Cancer (HCC), Class 1 obesity with body mass index (BMI) of 32.0 to 32.9 in adult, COPD (chronic obstructive pulmonary disease) (HCC), Diabetic polyneuropathy associated with type 2 diabetes mellitus (HCC), Elevated liver function tests, GERD (gastroesophageal reflux disease), Heart murmur, History of blood transfusion, HTN (hypertension), Hyperlipidemia, Obesity (BMI 30-39.9), GHADA (obstructive sleep apnea), Smoking, Type 2 diabetes mellitus (HCC), Urothelial cancer (HCC), Vitamin B12 deficiency, and Vitamin D deficiency.    Past Surgical History:   has a past surgical history that includes Hysterectomy (age 36); Carotid endarterectomy (Right, 06/13/2019); heent (2013 ); Breast biopsy (Right); Hysterectomy (2/2015); Salpingo-oophorectomy (Right, age 29); Salpingo-oophorectomy (Left, age 19); Bladder surgery (N/A, 12/23/2024); Bladder surgery (N/A, 1/14/2025); IR GUIDED NEPHROSTOMY CATH PLACEMENT RIGHT (1/15/2025); and Bladder surgery (N/A, 1/30/2025).    Medications:   Scheduled Meds:   Continuous Infusions:   PRN Meds:   Home Meds:   Prior to Admission medications    Medication Sig Start Date End Date Taking? Authorizing Provider   aspirin 81 MG EC tablet Take 1 tablet by

## 2025-02-28 ENCOUNTER — OFFICE VISIT (OUTPATIENT)
Dept: ONCOLOGY | Age: 80
End: 2025-02-28
Payer: MEDICARE

## 2025-02-28 ENCOUNTER — TELEPHONE (OUTPATIENT)
Dept: ONCOLOGY | Age: 80
End: 2025-02-28

## 2025-02-28 ENCOUNTER — APPOINTMENT (OUTPATIENT)
Dept: RADIATION ONCOLOGY | Age: 80
End: 2025-02-28
Payer: MEDICARE

## 2025-02-28 VITALS
HEART RATE: 79 BPM | HEIGHT: 59 IN | SYSTOLIC BLOOD PRESSURE: 132 MMHG | TEMPERATURE: 97.9 F | BODY MASS INDEX: 24.64 KG/M2 | DIASTOLIC BLOOD PRESSURE: 60 MMHG | OXYGEN SATURATION: 96 % | WEIGHT: 122.2 LBS | RESPIRATION RATE: 12 BRPM

## 2025-02-28 DIAGNOSIS — C68.9 UROTHELIAL CARCINOMA (HCC): Primary | ICD-10-CM

## 2025-02-28 PROCEDURE — 3075F SYST BP GE 130 - 139MM HG: CPT | Performed by: UROLOGY

## 2025-02-28 PROCEDURE — 99213 OFFICE O/P EST LOW 20 MIN: CPT | Performed by: UROLOGY

## 2025-02-28 PROCEDURE — 3078F DIAST BP <80 MM HG: CPT | Performed by: UROLOGY

## 2025-02-28 PROCEDURE — 1123F ACP DISCUSS/DSCN MKR DOCD: CPT | Performed by: UROLOGY

## 2025-02-28 PROCEDURE — G8420 CALC BMI NORM PARAMETERS: HCPCS | Performed by: UROLOGY

## 2025-02-28 PROCEDURE — 1111F DSCHRG MED/CURRENT MED MERGE: CPT | Performed by: UROLOGY

## 2025-02-28 PROCEDURE — G8399 PT W/DXA RESULTS DOCUMENT: HCPCS | Performed by: UROLOGY

## 2025-02-28 PROCEDURE — 1036F TOBACCO NON-USER: CPT | Performed by: UROLOGY

## 2025-02-28 PROCEDURE — 1090F PRES/ABSN URINE INCON ASSESS: CPT | Performed by: UROLOGY

## 2025-02-28 PROCEDURE — G8427 DOCREV CUR MEDS BY ELIG CLIN: HCPCS | Performed by: UROLOGY

## 2025-02-28 NOTE — PATIENT INSTRUCTIONS
unplanned hospital admissions or Emergency Room visits within 24 hours of discharge.    -------------------------------------------------------------------------------------------------------------------  Please call our office at [phone] if you have any  of the following symptoms:   Fever of 100.5 or greater  Chills  Shortness of breath  Swelling or pain in one leg    After office hours an answering service is available and will contact a provider for emergencies or if you are experiencing any of the above symptoms.        Meeta Ansari MA

## 2025-02-28 NOTE — TELEPHONE ENCOUNTER
Called pt's son, Zev, to inform him that pt can still receive IV iron without a port. It is necessary for her to receive it considering her IV levels are low. All questions answered and verbalized understanding.

## 2025-03-01 ENCOUNTER — HOSPITAL ENCOUNTER (OUTPATIENT)
Dept: INFUSION THERAPY | Age: 80
Setting detail: INFUSION SERIES
End: 2025-03-01

## 2025-03-03 ENCOUNTER — HOSPITAL ENCOUNTER (OUTPATIENT)
Dept: INTERVENTIONAL RADIOLOGY/VASCULAR | Age: 80
Discharge: HOME | End: 2025-03-05
Attending: INTERNAL MEDICINE
Payer: MEDICARE

## 2025-03-03 ENCOUNTER — FOLLOWUP TELEPHONE ENCOUNTER (OUTPATIENT)
Dept: CASE MANAGEMENT | Age: 80
End: 2025-03-03

## 2025-03-03 ENCOUNTER — PREP FOR PROCEDURE (OUTPATIENT)
Dept: ONCOLOGY | Age: 80
End: 2025-03-03

## 2025-03-03 ENCOUNTER — TELEPHONE (OUTPATIENT)
Dept: ONCOLOGY | Age: 80
End: 2025-03-03

## 2025-03-03 VITALS
WEIGHT: 122 LBS | OXYGEN SATURATION: 99 % | HEIGHT: 59 IN | DIASTOLIC BLOOD PRESSURE: 60 MMHG | RESPIRATION RATE: 16 BRPM | SYSTOLIC BLOOD PRESSURE: 130 MMHG | TEMPERATURE: 97.1 F | BODY MASS INDEX: 24.6 KG/M2 | HEART RATE: 66 BPM

## 2025-03-03 DIAGNOSIS — C68.9 UROTHELIAL CANCER (HCC): Primary | ICD-10-CM

## 2025-03-03 DIAGNOSIS — C68.9 UROTHELIAL CANCER (HCC): ICD-10-CM

## 2025-03-03 DIAGNOSIS — C68.9 UROTHELIAL CARCINOMA (HCC): ICD-10-CM

## 2025-03-03 DIAGNOSIS — D50.0 IRON DEFICIENCY ANEMIA DUE TO CHRONIC BLOOD LOSS: ICD-10-CM

## 2025-03-03 LAB
GLUCOSE BLD STRIP.AUTO-MCNC: 165 MG/DL (ref 65–100)
SERVICE CMNT-IMP: ABNORMAL

## 2025-03-03 PROCEDURE — 82962 GLUCOSE BLOOD TEST: CPT

## 2025-03-03 PROCEDURE — 2500000003 HC RX 250 WO HCPCS: Performed by: RADIOLOGY

## 2025-03-03 PROCEDURE — 99152 MOD SED SAME PHYS/QHP 5/>YRS: CPT | Performed by: RADIOLOGY

## 2025-03-03 PROCEDURE — 76937 US GUIDE VASCULAR ACCESS: CPT | Performed by: RADIOLOGY

## 2025-03-03 PROCEDURE — 36561 INSERT TUNNELED CV CATH: CPT

## 2025-03-03 PROCEDURE — C1894 INTRO/SHEATH, NON-LASER: HCPCS

## 2025-03-03 PROCEDURE — 77001 FLUOROGUIDE FOR VEIN DEVICE: CPT | Performed by: RADIOLOGY

## 2025-03-03 PROCEDURE — 6360000002 HC RX W HCPCS: Performed by: RADIOLOGY

## 2025-03-03 PROCEDURE — 36561 INSERT TUNNELED CV CATH: CPT | Performed by: RADIOLOGY

## 2025-03-03 RX ORDER — LIDOCAINE HYDROCHLORIDE AND EPINEPHRINE BITARTRATE 20; .01 MG/ML; MG/ML
INJECTION, SOLUTION SUBCUTANEOUS PRN
Status: COMPLETED | OUTPATIENT
Start: 2025-03-03 | End: 2025-03-03

## 2025-03-03 RX ORDER — MIDAZOLAM HYDROCHLORIDE 2 MG/2ML
INJECTION, SOLUTION INTRAMUSCULAR; INTRAVENOUS PRN
Status: COMPLETED | OUTPATIENT
Start: 2025-03-03 | End: 2025-03-03

## 2025-03-03 RX ORDER — FENTANYL CITRATE 50 UG/ML
INJECTION, SOLUTION INTRAMUSCULAR; INTRAVENOUS PRN
Status: COMPLETED | OUTPATIENT
Start: 2025-03-03 | End: 2025-03-03

## 2025-03-03 RX ORDER — LIDOCAINE HYDROCHLORIDE 20 MG/ML
INJECTION, SOLUTION INFILTRATION; PERINEURAL PRN
Status: COMPLETED | OUTPATIENT
Start: 2025-03-03 | End: 2025-03-03

## 2025-03-03 RX ADMIN — LIDOCAINE HYDROCHLORIDE 5 ML: 20 INJECTION, SOLUTION INFILTRATION; PERINEURAL at 14:43

## 2025-03-03 RX ADMIN — MIDAZOLAM HYDROCHLORIDE 1 MG: 1 INJECTION, SOLUTION INTRAMUSCULAR; INTRAVENOUS at 14:38

## 2025-03-03 RX ADMIN — WATER 2000 MG: 1 INJECTION INTRAMUSCULAR; INTRAVENOUS; SUBCUTANEOUS at 14:34

## 2025-03-03 RX ADMIN — FENTANYL CITRATE 50 MCG: 50 INJECTION, SOLUTION INTRAMUSCULAR; INTRAVENOUS at 14:34

## 2025-03-03 RX ADMIN — FENTANYL CITRATE 50 MCG: 50 INJECTION, SOLUTION INTRAMUSCULAR; INTRAVENOUS at 14:38

## 2025-03-03 RX ADMIN — MIDAZOLAM HYDROCHLORIDE 1 MG: 1 INJECTION, SOLUTION INTRAMUSCULAR; INTRAVENOUS at 14:34

## 2025-03-03 RX ADMIN — LIDOCAINE HYDROCHLORIDE AND EPINEPHRINE 20 ML: 20; 10 INJECTION, SOLUTION INFILTRATION; PERINEURAL at 14:43

## 2025-03-03 ASSESSMENT — ENCOUNTER SYMPTOMS
GASTROINTESTINAL NEGATIVE: 1
RESPIRATORY NEGATIVE: 1

## 2025-03-03 ASSESSMENT — PAIN SCALES - GENERAL
PAINLEVEL_OUTOF10: 0

## 2025-03-03 NOTE — TELEPHONE ENCOUNTER
Spoke to son, and Patient would like catheter removed. Patient states it is painful and she would like to come in when possible

## 2025-03-03 NOTE — PROGRESS NOTES
This RN navigator has completed following patient for 30 days post-discharge to prevent hospital readmission. This RN navigator will no longer be following.

## 2025-03-03 NOTE — OR NURSING
TRANSFER - OUT REPORT:           Verbal report given to KENDRA Rosales(name) on Maria Alejandra Castillo  being transferred to  Recovery 5(unit) for  routine post-op            Report consisted of patient’s Situation, Background, Assessment and      Recommendations(SBAR).          Information from the following report(s) SBAR, Procedure Summary, and MAR was reviewed with the receiving nurse.       Opportunity for questions and clarification was provided.          Conscious Sedation:    100 Mcg of Fentanyl administered   2 Mg of Versed administered     Pt tolerated procedure well.        VITALS:  /60   Pulse 70   Temp 97.1 °F (36.2 °C) (Infrared)   Resp 14   Ht 1.499 m (4' 11.02\")   Wt 55.3 kg (122 lb)   SpO2 98%   BMI 24.63 kg/m²

## 2025-03-03 NOTE — H&P
pack/day for 60.1 years (60.1 ttl pk-yrs)     Types: Cigarettes     Start date:      Quit date: 2025     Years since quittin.0    Smokeless tobacco: Never    Tobacco comments:     2 cigarettes a day, trying to quit using nicotine patches   Substance Use Topics    Alcohol use: No     Alcohol/week: 0.0 standard drinks of alcohol        Objective:       Physical Examination:    Vitals:    25 1353   BP: (!) 147/62   Pulse: 68   Resp: 14   Temp: 97.1 °F (36.2 °C)   TempSrc: Infrared   SpO2: 98%   Weight: 55.3 kg (122 lb)   Height: 1.499 m (4' 11.02\")       Pain Assessment       No data to display                            HEART: regular rate and rhythm, S1, S2 normal, no murmur, click, rub or gallop  LUNG: clear to auscultation bilaterally    Laboratory:     Lab Results   Component Value Date/Time     2025 04:07 AM     2025 11:42 AM    K 4.5 2025 04:07 AM    K 3.9 2025 11:42 AM    CL 97 2025 04:07 AM    CL 98 2025 11:42 AM    CO2 26 2025 04:07 AM    CO2 22 2025 11:42 AM    BUN 20 2025 04:07 AM    BUN 28 2025 11:42 AM    GFRAA 94 2020 08:47 AM    GFRAA 94 2020 10:04 AM    GLOB 3.7 2025 11:42 AM    GLOB 3.4 2024 01:27 PM    ALT 19 2025 11:42 AM    ALT 31 2024 01:27 PM     Lab Results   Component Value Date/Time    WBC 7.3 2025 04:07 AM    WBC 10.2 2025 11:42 AM    HGB 8.8 2025 04:07 AM    HGB 10.0 2025 11:42 AM    HCT 28.3 2025 04:07 AM    HCT 32.4 2025 11:42 AM     2025 04:07 AM     2025 11:42 AM     No results found for: \"APTT\", \"INR\"    Assessment:     Urothelial cancer        Plan:     Planned Procedure:  image guided chest port with moderate sedation    Risks, benefits, and alternatives reviewed with patient and she agrees to proceed with the procedure.      Signed By: PETER MAGDALENO MD     March 3, 2025

## 2025-03-03 NOTE — PRE SEDATION
Dispense sufficient amount for indicated testing frequency plus additional to accommodate PRN testing needs.  Patient not taking: Reported on 1/24/2025 10/3/22   Saba Meza APRN - CNP         Pre-Sedation Documentation and Exam:   I have reviewed the patient's history and review of systems.    Mallampati Airway Assessment:  Mallampati Class I - (soft palate, fauces, uvula & anterior/posterior tonsillar pillars are visible)    Prior History of Anesthesia Complications:   none    ASA Classification:  Class 3 - A patient with severe systemic disease that limits activity but is not incapacitating    Sedation/ Anesthesia Plan:   intravenous sedation    Medications Planned:   midazolam (Versed) intravenously and fentanyl intravenously    Patient is an appropriate candidate for plan of sedation: yes    Electronically signed by PETER MAGDALENO MD on 3/3/2025 at 2:22 PM

## 2025-03-03 NOTE — OP NOTE
[facility]  Department of Interventional Radiology  [phone]        Interventional Radiology Brief Procedure Note    Patient: Maria Alejandra Castillo MRN: [id number]  SSN: [id number]    YOB: 1945  Age: 79 y.o.  Sex: female      Date of Procedure: 3/3/2025    Pre-Procedure Diagnosis: urothelial cancer    Post-Procedure Diagnosis: SAME    Procedure(s): Venous Chest Port Placement    Brief Description of Procedure: as above    Performed By: PETER MAGDALENO MD     Assistants: None    Anesthesia:Moderate Sedation    Estimated Blood Loss: Less than 10ml    Specimens:  None    Implants:  Subcutaneous Port    Findings: patent right IJ    Complications: None    Recommendations: ok to use     Follow Up: prn    Signed By: PETER MAGDALENO MD     March 3, 2025

## 2025-03-03 NOTE — DISCHARGE INSTRUCTIONS
port does not seem to be working properly. You need to tell the nurses who use the port if you should have any pain or swelling at the site during an infusion.           To Reach Us:     If you have any questions about your procedure, please call the Interventional Radiology department at [phone].      After business hours (5pm) and weekends, call the answering service at [phone] and ask for the Interventional Radiologist on call to be paged.

## 2025-03-03 NOTE — OR NURSING
Recovery period without difficulty. Pt alert and oriented and denies pain. Dressing is clean, dry, and intact. Reviewed discharge instructions with patient and son, both verbalized understanding. [facility]. Vital signs and Juan Antonio score completed.

## 2025-03-04 ENCOUNTER — OFFICE VISIT (OUTPATIENT)
Dept: ONCOLOGY | Age: 80
End: 2025-03-04
Payer: MEDICARE

## 2025-03-04 ENCOUNTER — HOSPITAL ENCOUNTER (OUTPATIENT)
Dept: INFUSION THERAPY | Age: 80
Setting detail: INFUSION SERIES
Discharge: HOME | End: 2025-03-04
Payer: MEDICARE

## 2025-03-04 VITALS
TEMPERATURE: 97.7 F | WEIGHT: 123 LBS | SYSTOLIC BLOOD PRESSURE: 113 MMHG | DIASTOLIC BLOOD PRESSURE: 48 MMHG | HEIGHT: 59 IN | BODY MASS INDEX: 24.8 KG/M2 | RESPIRATION RATE: 18 BRPM | OXYGEN SATURATION: 98 % | HEART RATE: 84 BPM

## 2025-03-04 VITALS
OXYGEN SATURATION: 98 % | RESPIRATION RATE: 16 BRPM | HEART RATE: 72 BPM | SYSTOLIC BLOOD PRESSURE: 134 MMHG | DIASTOLIC BLOOD PRESSURE: 57 MMHG | TEMPERATURE: 98.3 F

## 2025-03-04 DIAGNOSIS — Z51.11 ENCOUNTER FOR ANTINEOPLASTIC CHEMOTHERAPY: Primary | ICD-10-CM

## 2025-03-04 DIAGNOSIS — D50.0 IRON DEFICIENCY ANEMIA DUE TO CHRONIC BLOOD LOSS: Primary | ICD-10-CM

## 2025-03-04 DIAGNOSIS — C68.9 UROTHELIAL CANCER (HCC): Primary | ICD-10-CM

## 2025-03-04 PROCEDURE — 1160F RVW MEDS BY RX/DR IN RCRD: CPT | Performed by: PHYSICIAN ASSISTANT

## 2025-03-04 PROCEDURE — 3078F DIAST BP <80 MM HG: CPT | Performed by: PHYSICIAN ASSISTANT

## 2025-03-04 PROCEDURE — 99213 OFFICE O/P EST LOW 20 MIN: CPT | Performed by: PHYSICIAN ASSISTANT

## 2025-03-04 PROCEDURE — G8399 PT W/DXA RESULTS DOCUMENT: HCPCS | Performed by: PHYSICIAN ASSISTANT

## 2025-03-04 PROCEDURE — G8427 DOCREV CUR MEDS BY ELIG CLIN: HCPCS | Performed by: PHYSICIAN ASSISTANT

## 2025-03-04 PROCEDURE — 1159F MED LIST DOCD IN RCRD: CPT | Performed by: PHYSICIAN ASSISTANT

## 2025-03-04 PROCEDURE — 2580000003 HC RX 258: Performed by: INTERNAL MEDICINE

## 2025-03-04 PROCEDURE — 6360000002 HC RX W HCPCS: Performed by: INTERNAL MEDICINE

## 2025-03-04 PROCEDURE — 3074F SYST BP LT 130 MM HG: CPT | Performed by: PHYSICIAN ASSISTANT

## 2025-03-04 PROCEDURE — 1090F PRES/ABSN URINE INCON ASSESS: CPT | Performed by: PHYSICIAN ASSISTANT

## 2025-03-04 PROCEDURE — 1036F TOBACCO NON-USER: CPT | Performed by: PHYSICIAN ASSISTANT

## 2025-03-04 PROCEDURE — 2500000003 HC RX 250 WO HCPCS: Performed by: INTERNAL MEDICINE

## 2025-03-04 PROCEDURE — G8420 CALC BMI NORM PARAMETERS: HCPCS | Performed by: PHYSICIAN ASSISTANT

## 2025-03-04 PROCEDURE — 1125F AMNT PAIN NOTED PAIN PRSNT: CPT | Performed by: PHYSICIAN ASSISTANT

## 2025-03-04 PROCEDURE — 96365 THER/PROPH/DIAG IV INF INIT: CPT

## 2025-03-04 PROCEDURE — 1123F ACP DISCUSS/DSCN MKR DOCD: CPT | Performed by: PHYSICIAN ASSISTANT

## 2025-03-04 RX ORDER — SODIUM CHLORIDE 0.9 % (FLUSH) 0.9 %
5-40 SYRINGE (ML) INJECTION PRN
Status: DISCONTINUED | OUTPATIENT
Start: 2025-03-04 | End: 2025-03-05 | Stop reason: HOSPADM

## 2025-03-04 RX ORDER — ALBUTEROL SULFATE 90 UG/1
4 INHALANT RESPIRATORY (INHALATION) PRN
Status: CANCELLED | OUTPATIENT
Start: 2025-03-11

## 2025-03-04 RX ORDER — HYDROCORTISONE SODIUM SUCCINATE 100 MG/2ML
100 INJECTION INTRAMUSCULAR; INTRAVENOUS
Status: CANCELLED | OUTPATIENT
Start: 2025-03-11

## 2025-03-04 RX ORDER — SODIUM CHLORIDE 9 MG/ML
5-250 INJECTION, SOLUTION INTRAVENOUS PRN
Status: CANCELLED | OUTPATIENT
Start: 2025-03-11

## 2025-03-04 RX ORDER — ACETAMINOPHEN 325 MG/1
650 TABLET ORAL
Status: CANCELLED | OUTPATIENT
Start: 2025-03-11

## 2025-03-04 RX ORDER — SODIUM CHLORIDE 0.9 % (FLUSH) 0.9 %
5-40 SYRINGE (ML) INJECTION PRN
Status: CANCELLED | OUTPATIENT
Start: 2025-03-11

## 2025-03-04 RX ORDER — HYDROCORTISONE SODIUM SUCCINATE 100 MG/2ML
100 INJECTION INTRAMUSCULAR; INTRAVENOUS
Status: DISCONTINUED | OUTPATIENT
Start: 2025-03-04 | End: 2025-03-05 | Stop reason: HOSPADM

## 2025-03-04 RX ORDER — LIDOCAINE AND PRILOCAINE 25; 25 MG/G; MG/G
CREAM TOPICAL
Qty: 1 EACH | Refills: 2 | Status: SHIPPED | OUTPATIENT
Start: 2025-03-04

## 2025-03-04 RX ORDER — ACETAMINOPHEN 325 MG/1
650 TABLET ORAL
Status: DISCONTINUED | OUTPATIENT
Start: 2025-03-04 | End: 2025-03-05 | Stop reason: HOSPADM

## 2025-03-04 RX ORDER — ONDANSETRON 2 MG/ML
8 INJECTION INTRAMUSCULAR; INTRAVENOUS
Status: DISCONTINUED | OUTPATIENT
Start: 2025-03-04 | End: 2025-03-05 | Stop reason: HOSPADM

## 2025-03-04 RX ORDER — SODIUM CHLORIDE 9 MG/ML
INJECTION, SOLUTION INTRAVENOUS CONTINUOUS
Status: CANCELLED | OUTPATIENT
Start: 2025-03-11

## 2025-03-04 RX ORDER — EPINEPHRINE 1 MG/ML
0.3 INJECTION, SOLUTION, CONCENTRATE INTRAVENOUS PRN
Status: CANCELLED | OUTPATIENT
Start: 2025-03-11

## 2025-03-04 RX ORDER — ALBUTEROL SULFATE 90 UG/1
4 INHALANT RESPIRATORY (INHALATION) PRN
Status: DISCONTINUED | OUTPATIENT
Start: 2025-03-04 | End: 2025-03-05 | Stop reason: HOSPADM

## 2025-03-04 RX ORDER — EPINEPHRINE 1 MG/ML
0.3 INJECTION, SOLUTION, CONCENTRATE INTRAVENOUS PRN
Status: DISCONTINUED | OUTPATIENT
Start: 2025-03-04 | End: 2025-03-05 | Stop reason: HOSPADM

## 2025-03-04 RX ORDER — HEPARIN 100 UNIT/ML
500 SYRINGE INTRAVENOUS PRN
Status: CANCELLED | OUTPATIENT
Start: 2025-03-11

## 2025-03-04 RX ORDER — DIPHENHYDRAMINE HYDROCHLORIDE 50 MG/ML
50 INJECTION INTRAMUSCULAR; INTRAVENOUS
Status: DISCONTINUED | OUTPATIENT
Start: 2025-03-04 | End: 2025-03-05 | Stop reason: HOSPADM

## 2025-03-04 RX ORDER — DIPHENHYDRAMINE HYDROCHLORIDE 50 MG/ML
50 INJECTION INTRAMUSCULAR; INTRAVENOUS
Status: CANCELLED | OUTPATIENT
Start: 2025-03-11

## 2025-03-04 RX ORDER — ONDANSETRON 2 MG/ML
8 INJECTION INTRAMUSCULAR; INTRAVENOUS
Status: CANCELLED | OUTPATIENT
Start: 2025-03-11

## 2025-03-04 RX ADMIN — IRON SUCROSE 300 MG: 20 INJECTION, SOLUTION INTRAVENOUS at 13:25

## 2025-03-04 RX ADMIN — SODIUM CHLORIDE, PRESERVATIVE FREE 10 ML: 5 INJECTION INTRAVENOUS at 12:52

## 2025-03-04 NOTE — PROGRESS NOTES
of patient's ability to void prior to leaving the office.  Her son is also aware that if she is unable to void prior to leaving that replacement of her Mederos catheter would be recommended to avoid potential need for ER overnight.  He also understands that even if she is able to void today if she were to develop urinary retention this evening she should present to the ED for evaluation.    Will cancel her follow-up visit with me in 4 weeks for Mederos exchange.  She will return for TURBT in May as scheduled.    Continue radiation and Gemzar per med onc and rad onc.        PLAN:     - Voiding trial today, 60 cc instilled, mederos removed  - Monitor for retention prior to discharge from infusion  - Cancel follow up in 4 weeks for mederos exchange  - Return for right stent exchange and possible TURBT in 3 months as scheduled  - RT and Gemzar per rad onc and med onc  ________________________________________      On this date 3/4/2025 I have spent 22 minutes reviewing previous notes, test results and face to face with the patient discussing the diagnosis and importance of compliance with the treatment plan as well as documenting on the day of the visit.      I have seen and examined this patient.  I have reviewed and edited the note started by the MA and agree with the outlined plan.  Part of this note was written by using a voice dictation software. The note has been proof read but may still contain some grammatical/other typographical errors.      Kenzie Rosa PA-C  Urologic Oncology  [facility]

## 2025-03-04 NOTE — PATIENT INSTRUCTIONS
Patient Information from Today's Visit    The members of your Oncology [facility] are listed below:    Physician Provider: Noel Holbrook, Medical Oncologist and Kyler Lipscomb, Urologic Oncologist  Advanced Practice Clinician: SRIKANTH Ghosh  Registered Nurse:   Nurse Navigator: Saba HANDY RN  Medical Assistant: Vanesa PENA MA  :Y[address] WAY  Supportive Care Services: Rick PINEDA LMSW    Diagnosis: Urothelial cancer     Follow Up Instructions:    Serna Removal today   We will see you back in 3 months for stent exchange and possible tumor resection as scheduled  We discussed signs and symptoms of urinary retention that should prompt you to present to the ED if occurs outside of office hours      Treatment Summary has been discussed and given to patient:       Current Labs:            Please refer to After Visit Summary or [facility] for upcoming appointment information. Please call our office for rescheduling needs at least 24 hours before your scheduled appointment time.If you have any questions regarding your upcoming schedule please reach out to your care team through [facility] or call [phone].     Please notify your assigned Nurse Navigator of any unplanned hospital admissions or Emergency Room visits within 24 hours of discharge.    -------------------------------------------------------------------------------------------------------------------  Please call our office at [phone] if you have any  of the following symptoms:   Fever of 100.5 or greater  Chills  Shortness of breath  Swelling or pain in one leg    After office hours an answering service is available and will contact a provider for emergencies or if you are experiencing any of the above symptoms.        Vanesa PENA MA

## 2025-03-04 NOTE — PROGRESS NOTES
Arrived to the Infusion Center.  90 minute Venofer iron infusion completed. Patient tolerated well.   Any issues or concerns during appointment: none.  Patient aware of next infusion appointment on 3/11/25 (date) at 1015 (time).  Patient aware of next lab and [facility] office visit on 3/11/25 (date) at 0830 (time). Message sent to scheduling to discuss patient's schedule with her son.   Patient instructed to call provider with temperature of 100.4 or greater or nausea/vomiting/ diarrhea or pain not controlled by medications  Discharged home ambulatory after 30 minute observation period.

## 2025-03-06 ENCOUNTER — CLINICAL DOCUMENTATION (OUTPATIENT)
Dept: CASE MANAGEMENT | Age: 80
End: 2025-03-06

## 2025-03-06 NOTE — PROGRESS NOTES
Nurse Navigation outreach related to care coordination.    Other Called and spoke with pts son Zev Castillo regarding upcoming appts. Zev is a  and changed jobs in October 2024. He currently shift is [phone] and he is feeling rather exhausted trying to coordinate his mother's care, his job, and sleep. Zev reports that his wife is working with [employer] to coordinate rides. Pt or family will notify navigation if there are any transportation gaps and assistance is needed. Pt was previously referred to social work. Pt and family appreciative of assistance.    Navigation Assessment:  The following were identified as potential barriers to care:   1. Transportation     Interventions and Plan:  Pt or family to notify navigation if there are gaps in transportation and assistance is needed.    Goal of next outreach: Follow up after C1D1  Time Spent: 20 minutes

## 2025-03-08 ENCOUNTER — APPOINTMENT (OUTPATIENT)
Dept: INFUSION THERAPY | Age: 80
End: 2025-03-08
Payer: MEDICARE

## 2025-03-10 ENCOUNTER — APPOINTMENT (OUTPATIENT)
Dept: RADIATION ONCOLOGY | Age: 80
End: 2025-03-10
Payer: MEDICARE

## 2025-03-11 ENCOUNTER — APPOINTMENT (OUTPATIENT)
Dept: RADIATION ONCOLOGY | Age: 80
End: 2025-03-11
Payer: MEDICARE

## 2025-03-11 ENCOUNTER — HOSPITAL ENCOUNTER (OUTPATIENT)
Dept: RADIATION ONCOLOGY | Age: 80
Setting detail: RECURRING SERIES
Discharge: HOME | End: 2025-03-14
Payer: MEDICARE

## 2025-03-11 ENCOUNTER — OFFICE VISIT (OUTPATIENT)
Dept: ONCOLOGY | Age: 80
End: 2025-03-11
Payer: MEDICARE

## 2025-03-11 ENCOUNTER — HOSPITAL ENCOUNTER (OUTPATIENT)
Dept: INFUSION THERAPY | Age: 80
Setting detail: INFUSION SERIES
Discharge: HOME | End: 2025-03-11
Payer: MEDICARE

## 2025-03-11 ENCOUNTER — HOSPITAL ENCOUNTER (OUTPATIENT)
Dept: LAB | Age: 80
Discharge: HOME | End: 2025-03-11
Payer: MEDICARE

## 2025-03-11 VITALS
TEMPERATURE: 99.9 F | DIASTOLIC BLOOD PRESSURE: 63 MMHG | WEIGHT: 118 LBS | RESPIRATION RATE: 16 BRPM | HEIGHT: 59 IN | HEART RATE: 83 BPM | BODY MASS INDEX: 23.79 KG/M2 | OXYGEN SATURATION: 97 % | SYSTOLIC BLOOD PRESSURE: 129 MMHG

## 2025-03-11 DIAGNOSIS — Z51.11 ENCOUNTER FOR ANTINEOPLASTIC CHEMOTHERAPY: ICD-10-CM

## 2025-03-11 DIAGNOSIS — C68.9 UROTHELIAL CANCER (HCC): ICD-10-CM

## 2025-03-11 DIAGNOSIS — C67.9 UROTHELIAL CARCINOMA OF BLADDER WITH INVASION OF MUSCLE (HCC): Primary | ICD-10-CM

## 2025-03-11 DIAGNOSIS — C67.9 UROTHELIAL CARCINOMA OF BLADDER WITH INVASION OF MUSCLE: Primary | ICD-10-CM

## 2025-03-11 DIAGNOSIS — D50.0 IRON DEFICIENCY ANEMIA DUE TO CHRONIC BLOOD LOSS: ICD-10-CM

## 2025-03-11 LAB
ALBUMIN SERPL-MCNC: 2.6 G/DL (ref 3.2–4.6)
ALBUMIN/GLOB SERPL: 0.6 (ref 1–1.9)
ALP SERPL-CCNC: 108 U/L (ref 35–104)
ALT SERPL-CCNC: 22 U/L (ref 8–45)
ANION GAP SERPL CALC-SCNC: 15 MMOL/L (ref 7–16)
AST SERPL-CCNC: 24 U/L (ref 15–37)
BASOPHILS # BLD: 0.02 K/UL (ref 0–0.2)
BASOPHILS NFR BLD: 0.2 % (ref 0–2)
BILIRUB SERPL-MCNC: 0.2 MG/DL (ref 0–1.2)
BUN SERPL-MCNC: 47 MG/DL (ref 8–23)
CALCIUM SERPL-MCNC: 9.1 MG/DL (ref 8.8–10.2)
CHLORIDE SERPL-SCNC: 96 MMOL/L (ref 98–107)
CO2 SERPL-SCNC: 24 MMOL/L (ref 20–29)
CREAT SERPL-MCNC: 1.08 MG/DL (ref 0.6–1.1)
DIFFERENTIAL METHOD BLD: ABNORMAL
EOSINOPHIL # BLD: 0.09 K/UL (ref 0–0.8)
EOSINOPHIL NFR BLD: 0.8 % (ref 0.5–7.8)
ERYTHROCYTE [DISTWIDTH] IN BLOOD BY AUTOMATED COUNT: 21.2 % (ref 11.9–14.6)
GLOBULIN SER CALC-MCNC: 4.7 G/DL (ref 2.3–3.5)
GLUCOSE SERPL-MCNC: 233 MG/DL (ref 70–99)
HBV SURFACE AB SERPL IA-ACNC: <3.5 MIU/ML
HBV SURFACE AG SER QL: NONREACTIVE
HCT VFR BLD AUTO: 31.9 % (ref 35.8–46.3)
HGB BLD-MCNC: 9.6 G/DL (ref 11.7–15.4)
IMM GRANULOCYTES # BLD AUTO: 0.1 K/UL (ref 0–0.5)
IMM GRANULOCYTES NFR BLD AUTO: 0.9 % (ref 0–5)
LYMPHOCYTES # BLD: 0.38 K/UL (ref 0.5–4.6)
LYMPHOCYTES NFR BLD: 3.5 % (ref 13–44)
MCH RBC QN AUTO: 25.7 PG (ref 26.1–32.9)
MCHC RBC AUTO-ENTMCNC: 30.1 G/DL (ref 31.4–35)
MCV RBC AUTO: 85.3 FL (ref 82–102)
MONOCYTES # BLD: 0.9 K/UL (ref 0.1–1.3)
MONOCYTES NFR BLD: 8.4 % (ref 4–12)
NEUTS SEG # BLD: 9.26 K/UL (ref 1.7–8.2)
NEUTS SEG NFR BLD: 86.2 % (ref 43–78)
NRBC # BLD: 0 K/UL (ref 0–0.2)
PLATELET # BLD AUTO: 401 K/UL (ref 150–450)
PMV BLD AUTO: 11.4 FL (ref 9.4–12.3)
POTASSIUM SERPL-SCNC: 3.3 MMOL/L (ref 3.5–5.1)
PROT SERPL-MCNC: 7.3 G/DL (ref 6.3–8.2)
RAD ONC ARIA COURSE FIRST TREATMENT DATE: NORMAL
RAD ONC ARIA COURSE ID: NORMAL
RAD ONC ARIA COURSE INTENT: NORMAL
RAD ONC ARIA COURSE LAST TREATMENT DATE: NORMAL
RAD ONC ARIA COURSE SESSION NUMBER: 1
RAD ONC ARIA COURSE START DATE: NORMAL
RAD ONC ARIA COURSE TREATMENT ELAPSED DAYS: 0
RAD ONC ARIA PLAN FRACTIONS TREATED TO DATE: 1
RAD ONC ARIA PLAN ID: NORMAL
RAD ONC ARIA PLAN PRESCRIBED DOSE PER FRACTION: 1.8 GY
RAD ONC ARIA PLAN PRIMARY REFERENCE POINT: NORMAL
RAD ONC ARIA PLAN TOTAL FRACTIONS PRESCRIBED: 28
RAD ONC ARIA PLAN TOTAL PRESCRIBED DOSE: 5040 CGY
RAD ONC ARIA REFERENCE POINT DOSAGE GIVEN TO DATE: 1.8 GY
RAD ONC ARIA REFERENCE POINT ID: NORMAL
RAD ONC ARIA REFERENCE POINT SESSION DOSAGE GIVEN: 1.8 GY
RBC # BLD AUTO: 3.74 M/UL (ref 4.05–5.2)
SODIUM SERPL-SCNC: 135 MMOL/L (ref 136–145)
WBC # BLD AUTO: 10.8 K/UL (ref 4.3–11.1)

## 2025-03-11 PROCEDURE — 77386 HC NTSTY MODUL RAD TX DLVR CPLX: CPT

## 2025-03-11 PROCEDURE — G8428 CUR MEDS NOT DOCUMENT: HCPCS | Performed by: INTERNAL MEDICINE

## 2025-03-11 PROCEDURE — 1123F ACP DISCUSS/DSCN MKR DOCD: CPT | Performed by: INTERNAL MEDICINE

## 2025-03-11 PROCEDURE — 86706 HEP B SURFACE ANTIBODY: CPT

## 2025-03-11 PROCEDURE — G2211 COMPLEX E/M VISIT ADD ON: HCPCS | Performed by: INTERNAL MEDICINE

## 2025-03-11 PROCEDURE — 2500000003 HC RX 250 WO HCPCS: Performed by: INTERNAL MEDICINE

## 2025-03-11 PROCEDURE — 3078F DIAST BP <80 MM HG: CPT | Performed by: INTERNAL MEDICINE

## 2025-03-11 PROCEDURE — 80053 COMPREHEN METABOLIC PANEL: CPT

## 2025-03-11 PROCEDURE — 6360000002 HC RX W HCPCS: Performed by: INTERNAL MEDICINE

## 2025-03-11 PROCEDURE — 3074F SYST BP LT 130 MM HG: CPT | Performed by: INTERNAL MEDICINE

## 2025-03-11 PROCEDURE — 2580000003 HC RX 258: Performed by: INTERNAL MEDICINE

## 2025-03-11 PROCEDURE — 96375 TX/PRO/DX INJ NEW DRUG ADDON: CPT

## 2025-03-11 PROCEDURE — 96413 CHEMO IV INFUSION 1 HR: CPT

## 2025-03-11 PROCEDURE — 99214 OFFICE O/P EST MOD 30 MIN: CPT | Performed by: INTERNAL MEDICINE

## 2025-03-11 PROCEDURE — G8399 PT W/DXA RESULTS DOCUMENT: HCPCS | Performed by: INTERNAL MEDICINE

## 2025-03-11 PROCEDURE — 86704 HEP B CORE ANTIBODY TOTAL: CPT

## 2025-03-11 PROCEDURE — 85025 COMPLETE CBC W/AUTO DIFF WBC: CPT

## 2025-03-11 PROCEDURE — 1090F PRES/ABSN URINE INCON ASSESS: CPT | Performed by: INTERNAL MEDICINE

## 2025-03-11 PROCEDURE — 87340 HEPATITIS B SURFACE AG IA: CPT

## 2025-03-11 PROCEDURE — 1159F MED LIST DOCD IN RCRD: CPT | Performed by: INTERNAL MEDICINE

## 2025-03-11 PROCEDURE — G8420 CALC BMI NORM PARAMETERS: HCPCS | Performed by: INTERNAL MEDICINE

## 2025-03-11 PROCEDURE — 1036F TOBACCO NON-USER: CPT | Performed by: INTERNAL MEDICINE

## 2025-03-11 PROCEDURE — 1126F AMNT PAIN NOTED NONE PRSNT: CPT | Performed by: INTERNAL MEDICINE

## 2025-03-11 RX ORDER — MEPERIDINE HYDROCHLORIDE 50 MG/ML
12.5 INJECTION INTRAMUSCULAR; INTRAVENOUS; SUBCUTANEOUS PRN
OUTPATIENT
Start: 2025-04-04

## 2025-03-11 RX ORDER — HYDROCORTISONE SODIUM SUCCINATE 100 MG/2ML
100 INJECTION INTRAMUSCULAR; INTRAVENOUS
Status: CANCELLED | OUTPATIENT
Start: 2025-03-11

## 2025-03-11 RX ORDER — SODIUM CHLORIDE 0.9 % (FLUSH) 0.9 %
5-40 SYRINGE (ML) INJECTION PRN
OUTPATIENT
Start: 2025-03-25

## 2025-03-11 RX ORDER — ACETAMINOPHEN 325 MG/1
650 TABLET ORAL
OUTPATIENT
Start: 2025-03-25

## 2025-03-11 RX ORDER — ONDANSETRON 2 MG/ML
8 INJECTION INTRAMUSCULAR; INTRAVENOUS
OUTPATIENT
Start: 2025-03-14

## 2025-03-11 RX ORDER — HYDROCORTISONE SODIUM SUCCINATE 100 MG/2ML
100 INJECTION INTRAMUSCULAR; INTRAVENOUS
OUTPATIENT
Start: 2025-04-04

## 2025-03-11 RX ORDER — HEPARIN SODIUM (PORCINE) LOCK FLUSH IV SOLN 100 UNIT/ML 100 UNIT/ML
500 SOLUTION INTRAVENOUS PRN
Status: CANCELLED | OUTPATIENT
Start: 2025-03-11

## 2025-03-11 RX ORDER — FAMOTIDINE 10 MG/ML
20 INJECTION, SOLUTION INTRAVENOUS
Status: CANCELLED | OUTPATIENT
Start: 2025-03-14

## 2025-03-11 RX ORDER — DIPHENHYDRAMINE HYDROCHLORIDE 50 MG/ML
50 INJECTION INTRAMUSCULAR; INTRAVENOUS
Status: CANCELLED | OUTPATIENT
Start: 2025-03-14

## 2025-03-11 RX ORDER — MEPERIDINE HYDROCHLORIDE 50 MG/ML
12.5 INJECTION INTRAMUSCULAR; INTRAVENOUS; SUBCUTANEOUS PRN
OUTPATIENT
Start: 2025-04-01

## 2025-03-11 RX ORDER — FAMOTIDINE 10 MG/ML
20 INJECTION, SOLUTION INTRAVENOUS
OUTPATIENT
Start: 2025-03-18

## 2025-03-11 RX ORDER — HYDROCORTISONE SODIUM SUCCINATE 100 MG/2ML
100 INJECTION INTRAMUSCULAR; INTRAVENOUS
OUTPATIENT
Start: 2025-03-18

## 2025-03-11 RX ORDER — ALBUTEROL SULFATE 90 UG/1
4 INHALANT RESPIRATORY (INHALATION) PRN
OUTPATIENT
Start: 2025-04-01

## 2025-03-11 RX ORDER — MEPERIDINE HYDROCHLORIDE 50 MG/ML
12.5 INJECTION INTRAMUSCULAR; INTRAVENOUS; SUBCUTANEOUS PRN
OUTPATIENT
Start: 2025-03-21

## 2025-03-11 RX ORDER — ACETAMINOPHEN 325 MG/1
650 TABLET ORAL
Status: CANCELLED | OUTPATIENT
Start: 2025-03-11

## 2025-03-11 RX ORDER — MEPERIDINE HYDROCHLORIDE 25 MG/ML
12.5 INJECTION INTRAMUSCULAR; INTRAVENOUS; SUBCUTANEOUS PRN
Status: DISCONTINUED | OUTPATIENT
Start: 2025-03-11 | End: 2025-03-12 | Stop reason: HOSPADM

## 2025-03-11 RX ORDER — DIPHENHYDRAMINE HYDROCHLORIDE 50 MG/ML
50 INJECTION INTRAMUSCULAR; INTRAVENOUS
OUTPATIENT
Start: 2025-03-28

## 2025-03-11 RX ORDER — SODIUM CHLORIDE 9 MG/ML
INJECTION, SOLUTION INTRAVENOUS CONTINUOUS
OUTPATIENT
Start: 2025-03-28

## 2025-03-11 RX ORDER — SODIUM CHLORIDE 9 MG/ML
5-250 INJECTION, SOLUTION INTRAVENOUS PRN
OUTPATIENT
Start: 2025-03-28

## 2025-03-11 RX ORDER — SODIUM CHLORIDE 9 MG/ML
5-250 INJECTION, SOLUTION INTRAVENOUS PRN
Status: CANCELLED | OUTPATIENT
Start: 2025-03-11

## 2025-03-11 RX ORDER — ALBUTEROL SULFATE 90 UG/1
4 INHALANT RESPIRATORY (INHALATION) PRN
OUTPATIENT
Start: 2025-03-18

## 2025-03-11 RX ORDER — ONDANSETRON 2 MG/ML
8 INJECTION INTRAMUSCULAR; INTRAVENOUS ONCE
OUTPATIENT
Start: 2025-03-18 | End: 2025-03-18

## 2025-03-11 RX ORDER — MEPERIDINE HYDROCHLORIDE 50 MG/ML
12.5 INJECTION INTRAMUSCULAR; INTRAVENOUS; SUBCUTANEOUS PRN
Status: CANCELLED | OUTPATIENT
Start: 2025-03-11

## 2025-03-11 RX ORDER — HYDROCORTISONE SODIUM SUCCINATE 100 MG/2ML
100 INJECTION INTRAMUSCULAR; INTRAVENOUS
OUTPATIENT
Start: 2025-03-25

## 2025-03-11 RX ORDER — SODIUM CHLORIDE 9 MG/ML
INJECTION, SOLUTION INTRAVENOUS CONTINUOUS
OUTPATIENT
Start: 2025-03-21

## 2025-03-11 RX ORDER — MEPERIDINE HYDROCHLORIDE 50 MG/ML
12.5 INJECTION INTRAMUSCULAR; INTRAVENOUS; SUBCUTANEOUS PRN
OUTPATIENT
Start: 2025-03-14

## 2025-03-11 RX ORDER — ONDANSETRON 2 MG/ML
8 INJECTION INTRAMUSCULAR; INTRAVENOUS
OUTPATIENT
Start: 2025-03-18

## 2025-03-11 RX ORDER — SODIUM CHLORIDE 0.9 % (FLUSH) 0.9 %
5-40 SYRINGE (ML) INJECTION PRN
Status: CANCELLED | OUTPATIENT
Start: 2025-03-11

## 2025-03-11 RX ORDER — ONDANSETRON 2 MG/ML
8 INJECTION INTRAMUSCULAR; INTRAVENOUS ONCE
OUTPATIENT
Start: 2025-03-28 | End: 2025-03-28

## 2025-03-11 RX ORDER — DIPHENHYDRAMINE HYDROCHLORIDE 50 MG/ML
50 INJECTION, SOLUTION INTRAMUSCULAR; INTRAVENOUS
Status: DISCONTINUED | OUTPATIENT
Start: 2025-03-11 | End: 2025-03-12 | Stop reason: HOSPADM

## 2025-03-11 RX ORDER — ONDANSETRON 2 MG/ML
8 INJECTION INTRAMUSCULAR; INTRAVENOUS
OUTPATIENT
Start: 2025-03-21

## 2025-03-11 RX ORDER — HEPARIN SODIUM (PORCINE) LOCK FLUSH IV SOLN 100 UNIT/ML 100 UNIT/ML
500 SOLUTION INTRAVENOUS PRN
OUTPATIENT
Start: 2025-03-18

## 2025-03-11 RX ORDER — HYDROCORTISONE SODIUM SUCCINATE 100 MG/2ML
100 INJECTION INTRAMUSCULAR; INTRAVENOUS
OUTPATIENT
Start: 2025-04-01

## 2025-03-11 RX ORDER — ONDANSETRON 2 MG/ML
8 INJECTION INTRAMUSCULAR; INTRAVENOUS
Status: COMPLETED | OUTPATIENT
Start: 2025-03-11 | End: 2025-03-11

## 2025-03-11 RX ORDER — MEPERIDINE HYDROCHLORIDE 50 MG/ML
12.5 INJECTION INTRAMUSCULAR; INTRAVENOUS; SUBCUTANEOUS PRN
OUTPATIENT
Start: 2025-03-25

## 2025-03-11 RX ORDER — HYDROCORTISONE SODIUM SUCCINATE 100 MG/2ML
100 INJECTION INTRAMUSCULAR; INTRAVENOUS
OUTPATIENT
Start: 2025-03-28

## 2025-03-11 RX ORDER — MEPERIDINE HYDROCHLORIDE 50 MG/ML
12.5 INJECTION INTRAMUSCULAR; INTRAVENOUS; SUBCUTANEOUS PRN
OUTPATIENT
Start: 2025-03-18

## 2025-03-11 RX ORDER — FAMOTIDINE 10 MG/ML
20 INJECTION, SOLUTION INTRAVENOUS
OUTPATIENT
Start: 2025-03-21

## 2025-03-11 RX ORDER — SODIUM CHLORIDE 0.9 % (FLUSH) 0.9 %
5-40 SYRINGE (ML) INJECTION PRN
Status: DISCONTINUED | OUTPATIENT
Start: 2025-03-11 | End: 2025-03-12 | Stop reason: HOSPADM

## 2025-03-11 RX ORDER — ACETAMINOPHEN 325 MG/1
650 TABLET ORAL
OUTPATIENT
Start: 2025-03-18

## 2025-03-11 RX ORDER — SODIUM CHLORIDE 9 MG/ML
5-250 INJECTION, SOLUTION INTRAVENOUS PRN
OUTPATIENT
Start: 2025-03-25

## 2025-03-11 RX ORDER — ONDANSETRON 2 MG/ML
8 INJECTION INTRAMUSCULAR; INTRAVENOUS ONCE
OUTPATIENT
Start: 2025-03-25 | End: 2025-03-25

## 2025-03-11 RX ORDER — ALBUTEROL SULFATE 90 UG/1
4 INHALANT RESPIRATORY (INHALATION) PRN
OUTPATIENT
Start: 2025-04-04

## 2025-03-11 RX ORDER — EPINEPHRINE 1 MG/ML
0.3 INJECTION, SOLUTION, CONCENTRATE INTRAVENOUS PRN
Status: DISCONTINUED | OUTPATIENT
Start: 2025-03-11 | End: 2025-03-12 | Stop reason: HOSPADM

## 2025-03-11 RX ORDER — ACETAMINOPHEN 325 MG/1
650 TABLET ORAL
OUTPATIENT
Start: 2025-03-14

## 2025-03-11 RX ORDER — HEPARIN SODIUM (PORCINE) LOCK FLUSH IV SOLN 100 UNIT/ML 100 UNIT/ML
500 SOLUTION INTRAVENOUS PRN
OUTPATIENT
Start: 2025-04-01

## 2025-03-11 RX ORDER — ONDANSETRON 2 MG/ML
8 INJECTION INTRAMUSCULAR; INTRAVENOUS ONCE
Status: CANCELLED | OUTPATIENT
Start: 2025-03-11 | End: 2025-03-11

## 2025-03-11 RX ORDER — SODIUM CHLORIDE 9 MG/ML
5-250 INJECTION, SOLUTION INTRAVENOUS PRN
OUTPATIENT
Start: 2025-04-04

## 2025-03-11 RX ORDER — SODIUM CHLORIDE 0.9 % (FLUSH) 0.9 %
5-40 SYRINGE (ML) INJECTION PRN
Status: CANCELLED | OUTPATIENT
Start: 2025-03-14

## 2025-03-11 RX ORDER — ALBUTEROL SULFATE 90 UG/1
4 INHALANT RESPIRATORY (INHALATION) PRN
Status: DISCONTINUED | OUTPATIENT
Start: 2025-03-11 | End: 2025-03-12 | Stop reason: HOSPADM

## 2025-03-11 RX ORDER — HEPARIN SODIUM (PORCINE) LOCK FLUSH IV SOLN 100 UNIT/ML 100 UNIT/ML
500 SOLUTION INTRAVENOUS PRN
OUTPATIENT
Start: 2025-03-14

## 2025-03-11 RX ORDER — EPINEPHRINE 1 MG/ML
0.3 INJECTION, SOLUTION, CONCENTRATE INTRAVENOUS PRN
OUTPATIENT
Start: 2025-03-25

## 2025-03-11 RX ORDER — ONDANSETRON 2 MG/ML
8 INJECTION INTRAMUSCULAR; INTRAVENOUS ONCE
Status: DISCONTINUED | OUTPATIENT
Start: 2025-03-11 | End: 2025-03-12 | Stop reason: HOSPADM

## 2025-03-11 RX ORDER — HYDROCORTISONE SODIUM SUCCINATE 100 MG/2ML
100 INJECTION INTRAMUSCULAR; INTRAVENOUS
Status: CANCELLED | OUTPATIENT
Start: 2025-03-14

## 2025-03-11 RX ORDER — ACETAMINOPHEN 325 MG/1
650 TABLET ORAL
OUTPATIENT
Start: 2025-03-21

## 2025-03-11 RX ORDER — ACETAMINOPHEN 325 MG/1
650 TABLET ORAL
Status: DISCONTINUED | OUTPATIENT
Start: 2025-03-11 | End: 2025-03-12 | Stop reason: HOSPADM

## 2025-03-11 RX ORDER — ACETAMINOPHEN 325 MG/1
650 TABLET ORAL
OUTPATIENT
Start: 2025-04-01

## 2025-03-11 RX ORDER — SODIUM CHLORIDE 9 MG/ML
5-250 INJECTION, SOLUTION INTRAVENOUS PRN
OUTPATIENT
Start: 2025-04-01

## 2025-03-11 RX ORDER — FAMOTIDINE 10 MG/ML
20 INJECTION, SOLUTION INTRAVENOUS
OUTPATIENT
Start: 2025-03-28

## 2025-03-11 RX ORDER — ONDANSETRON 2 MG/ML
8 INJECTION INTRAMUSCULAR; INTRAVENOUS ONCE
OUTPATIENT
Start: 2025-04-01 | End: 2025-04-01

## 2025-03-11 RX ORDER — DIPHENHYDRAMINE HYDROCHLORIDE 50 MG/ML
50 INJECTION INTRAMUSCULAR; INTRAVENOUS
OUTPATIENT
Start: 2025-04-04

## 2025-03-11 RX ORDER — DIPHENHYDRAMINE HYDROCHLORIDE 50 MG/ML
50 INJECTION INTRAMUSCULAR; INTRAVENOUS
Status: CANCELLED | OUTPATIENT
Start: 2025-03-11

## 2025-03-11 RX ORDER — ONDANSETRON 2 MG/ML
8 INJECTION INTRAMUSCULAR; INTRAVENOUS
OUTPATIENT
Start: 2025-04-01

## 2025-03-11 RX ORDER — ONDANSETRON 2 MG/ML
8 INJECTION INTRAMUSCULAR; INTRAVENOUS
OUTPATIENT
Start: 2025-03-28

## 2025-03-11 RX ORDER — EPINEPHRINE 1 MG/ML
0.3 INJECTION, SOLUTION, CONCENTRATE INTRAVENOUS PRN
OUTPATIENT
Start: 2025-04-01

## 2025-03-11 RX ORDER — FAMOTIDINE 10 MG/ML
20 INJECTION, SOLUTION INTRAVENOUS
OUTPATIENT
Start: 2025-04-01

## 2025-03-11 RX ORDER — ALBUTEROL SULFATE 90 UG/1
4 INHALANT RESPIRATORY (INHALATION) PRN
Status: CANCELLED | OUTPATIENT
Start: 2025-03-11

## 2025-03-11 RX ORDER — DIPHENHYDRAMINE HYDROCHLORIDE 50 MG/ML
50 INJECTION INTRAMUSCULAR; INTRAVENOUS
OUTPATIENT
Start: 2025-04-01

## 2025-03-11 RX ORDER — HEPARIN SODIUM (PORCINE) LOCK FLUSH IV SOLN 100 UNIT/ML 100 UNIT/ML
500 SOLUTION INTRAVENOUS PRN
OUTPATIENT
Start: 2025-03-28

## 2025-03-11 RX ORDER — SODIUM CHLORIDE 9 MG/ML
INJECTION, SOLUTION INTRAVENOUS CONTINUOUS
OUTPATIENT
Start: 2025-04-01

## 2025-03-11 RX ORDER — EPINEPHRINE 1 MG/ML
0.3 INJECTION, SOLUTION, CONCENTRATE INTRAVENOUS PRN
OUTPATIENT
Start: 2025-04-04

## 2025-03-11 RX ORDER — HEPARIN SODIUM (PORCINE) LOCK FLUSH IV SOLN 100 UNIT/ML 100 UNIT/ML
500 SOLUTION INTRAVENOUS PRN
OUTPATIENT
Start: 2025-04-04

## 2025-03-11 RX ORDER — SODIUM CHLORIDE 0.9 % (FLUSH) 0.9 %
5-40 SYRINGE (ML) INJECTION PRN
OUTPATIENT
Start: 2025-04-04

## 2025-03-11 RX ORDER — FAMOTIDINE 10 MG/ML
20 INJECTION, SOLUTION INTRAVENOUS
OUTPATIENT
Start: 2025-03-25

## 2025-03-11 RX ORDER — ONDANSETRON 2 MG/ML
8 INJECTION INTRAMUSCULAR; INTRAVENOUS ONCE
Status: CANCELLED | OUTPATIENT
Start: 2025-03-14 | End: 2025-03-14

## 2025-03-11 RX ORDER — SODIUM CHLORIDE 9 MG/ML
INJECTION, SOLUTION INTRAVENOUS CONTINUOUS
OUTPATIENT
Start: 2025-04-04

## 2025-03-11 RX ORDER — SODIUM CHLORIDE 9 MG/ML
INJECTION, SOLUTION INTRAVENOUS CONTINUOUS
OUTPATIENT
Start: 2025-03-25

## 2025-03-11 RX ORDER — ALBUTEROL SULFATE 90 UG/1
4 INHALANT RESPIRATORY (INHALATION) PRN
OUTPATIENT
Start: 2025-03-25

## 2025-03-11 RX ORDER — SODIUM CHLORIDE 9 MG/ML
5-250 INJECTION, SOLUTION INTRAVENOUS PRN
OUTPATIENT
Start: 2025-03-18

## 2025-03-11 RX ORDER — SODIUM CHLORIDE 0.9 % (FLUSH) 0.9 %
5-40 SYRINGE (ML) INJECTION PRN
OUTPATIENT
Start: 2025-04-01

## 2025-03-11 RX ORDER — SODIUM CHLORIDE 0.9 % (FLUSH) 0.9 %
5-40 SYRINGE (ML) INJECTION PRN
OUTPATIENT
Start: 2025-03-18

## 2025-03-11 RX ORDER — SODIUM CHLORIDE 9 MG/ML
5-250 INJECTION, SOLUTION INTRAVENOUS PRN
OUTPATIENT
Start: 2025-03-21

## 2025-03-11 RX ORDER — ALBUTEROL SULFATE 90 UG/1
4 INHALANT RESPIRATORY (INHALATION) PRN
OUTPATIENT
Start: 2025-03-28

## 2025-03-11 RX ORDER — HEPARIN SODIUM (PORCINE) LOCK FLUSH IV SOLN 100 UNIT/ML 100 UNIT/ML
500 SOLUTION INTRAVENOUS PRN
OUTPATIENT
Start: 2025-03-25

## 2025-03-11 RX ORDER — DIPHENHYDRAMINE HYDROCHLORIDE 50 MG/ML
50 INJECTION INTRAMUSCULAR; INTRAVENOUS
OUTPATIENT
Start: 2025-03-18

## 2025-03-11 RX ORDER — SODIUM CHLORIDE 0.9 % (FLUSH) 0.9 %
5-40 SYRINGE (ML) INJECTION PRN
OUTPATIENT
Start: 2025-03-28

## 2025-03-11 RX ORDER — EPINEPHRINE 1 MG/ML
0.3 INJECTION, SOLUTION, CONCENTRATE INTRAVENOUS PRN
OUTPATIENT
Start: 2025-03-18

## 2025-03-11 RX ORDER — ALBUTEROL SULFATE 90 UG/1
4 INHALANT RESPIRATORY (INHALATION) PRN
OUTPATIENT
Start: 2025-03-14

## 2025-03-11 RX ORDER — SODIUM CHLORIDE 9 MG/ML
5-250 INJECTION, SOLUTION INTRAVENOUS PRN
OUTPATIENT
Start: 2025-03-14

## 2025-03-11 RX ORDER — HYDROCORTISONE SODIUM SUCCINATE 100 MG/2ML
100 INJECTION INTRAMUSCULAR; INTRAVENOUS
Status: DISCONTINUED | OUTPATIENT
Start: 2025-03-11 | End: 2025-03-12 | Stop reason: HOSPADM

## 2025-03-11 RX ORDER — DIPHENHYDRAMINE HYDROCHLORIDE 50 MG/ML
50 INJECTION INTRAMUSCULAR; INTRAVENOUS
OUTPATIENT
Start: 2025-03-21

## 2025-03-11 RX ORDER — SODIUM CHLORIDE 9 MG/ML
INJECTION, SOLUTION INTRAVENOUS CONTINUOUS
OUTPATIENT
Start: 2025-03-18

## 2025-03-11 RX ORDER — ONDANSETRON 2 MG/ML
8 INJECTION INTRAMUSCULAR; INTRAVENOUS ONCE
OUTPATIENT
Start: 2025-03-21 | End: 2025-03-21

## 2025-03-11 RX ORDER — EPINEPHRINE 1 MG/ML
0.3 INJECTION, SOLUTION, CONCENTRATE INTRAVENOUS PRN
OUTPATIENT
Start: 2025-03-14

## 2025-03-11 RX ORDER — FAMOTIDINE 10 MG/ML
20 INJECTION, SOLUTION INTRAVENOUS
OUTPATIENT
Start: 2025-04-04

## 2025-03-11 RX ORDER — ACETAMINOPHEN 325 MG/1
650 TABLET ORAL
OUTPATIENT
Start: 2025-03-28

## 2025-03-11 RX ORDER — HEPARIN SODIUM (PORCINE) LOCK FLUSH IV SOLN 100 UNIT/ML 100 UNIT/ML
500 SOLUTION INTRAVENOUS PRN
OUTPATIENT
Start: 2025-03-21

## 2025-03-11 RX ORDER — DIPHENHYDRAMINE HYDROCHLORIDE 50 MG/ML
50 INJECTION INTRAMUSCULAR; INTRAVENOUS
OUTPATIENT
Start: 2025-03-25

## 2025-03-11 RX ORDER — ALBUTEROL SULFATE 90 UG/1
4 INHALANT RESPIRATORY (INHALATION) PRN
OUTPATIENT
Start: 2025-03-21

## 2025-03-11 RX ORDER — ONDANSETRON 2 MG/ML
8 INJECTION INTRAMUSCULAR; INTRAVENOUS ONCE
OUTPATIENT
Start: 2025-04-04 | End: 2025-04-04

## 2025-03-11 RX ORDER — HYDROCORTISONE SODIUM SUCCINATE 100 MG/2ML
100 INJECTION INTRAMUSCULAR; INTRAVENOUS
OUTPATIENT
Start: 2025-03-21

## 2025-03-11 RX ORDER — SODIUM CHLORIDE 9 MG/ML
INJECTION, SOLUTION INTRAVENOUS CONTINUOUS
OUTPATIENT
Start: 2025-03-14

## 2025-03-11 RX ORDER — SODIUM CHLORIDE 9 MG/ML
INJECTION, SOLUTION INTRAVENOUS CONTINUOUS
Status: CANCELLED | OUTPATIENT
Start: 2025-03-11

## 2025-03-11 RX ORDER — SODIUM CHLORIDE 9 MG/ML
5-250 INJECTION, SOLUTION INTRAVENOUS PRN
Status: DISCONTINUED | OUTPATIENT
Start: 2025-03-11 | End: 2025-03-12 | Stop reason: HOSPADM

## 2025-03-11 RX ORDER — MEPERIDINE HYDROCHLORIDE 50 MG/ML
12.5 INJECTION INTRAMUSCULAR; INTRAVENOUS; SUBCUTANEOUS PRN
OUTPATIENT
Start: 2025-03-28

## 2025-03-11 RX ORDER — FAMOTIDINE 10 MG/ML
20 INJECTION, SOLUTION INTRAVENOUS
Status: CANCELLED | OUTPATIENT
Start: 2025-03-11

## 2025-03-11 RX ORDER — ONDANSETRON 2 MG/ML
8 INJECTION INTRAMUSCULAR; INTRAVENOUS
Status: CANCELLED | OUTPATIENT
Start: 2025-03-11

## 2025-03-11 RX ORDER — EPINEPHRINE 1 MG/ML
0.3 INJECTION, SOLUTION, CONCENTRATE INTRAVENOUS PRN
OUTPATIENT
Start: 2025-03-21

## 2025-03-11 RX ORDER — ACETAMINOPHEN 325 MG/1
650 TABLET ORAL
OUTPATIENT
Start: 2025-04-04

## 2025-03-11 RX ORDER — SODIUM CHLORIDE 0.9 % (FLUSH) 0.9 %
5-40 SYRINGE (ML) INJECTION PRN
OUTPATIENT
Start: 2025-03-21

## 2025-03-11 RX ORDER — ONDANSETRON 2 MG/ML
8 INJECTION INTRAMUSCULAR; INTRAVENOUS
OUTPATIENT
Start: 2025-04-04

## 2025-03-11 RX ORDER — EPINEPHRINE 1 MG/ML
0.3 INJECTION, SOLUTION, CONCENTRATE INTRAVENOUS PRN
OUTPATIENT
Start: 2025-03-28

## 2025-03-11 RX ORDER — ONDANSETRON 2 MG/ML
8 INJECTION INTRAMUSCULAR; INTRAVENOUS
OUTPATIENT
Start: 2025-03-25

## 2025-03-11 RX ORDER — EPINEPHRINE 1 MG/ML
0.3 INJECTION, SOLUTION, CONCENTRATE INTRAVENOUS PRN
Status: CANCELLED | OUTPATIENT
Start: 2025-03-11

## 2025-03-11 RX ADMIN — SODIUM CHLORIDE, PRESERVATIVE FREE 20 ML: 5 INJECTION INTRAVENOUS at 08:55

## 2025-03-11 RX ADMIN — SODIUM CHLORIDE, PRESERVATIVE FREE 10 ML: 5 INJECTION INTRAVENOUS at 10:15

## 2025-03-11 RX ADMIN — ONDANSETRON 8 MG: 2 INJECTION, SOLUTION INTRAMUSCULAR; INTRAVENOUS at 10:21

## 2025-03-11 RX ADMIN — SODIUM CHLORIDE 50 ML/HR: 9 INJECTION, SOLUTION INTRAVENOUS at 10:17

## 2025-03-11 RX ADMIN — GEMCITABINE HYDROCHLORIDE 41 MG: 200 INJECTION, POWDER, LYOPHILIZED, FOR SOLUTION INTRAVENOUS at 10:49

## 2025-03-11 ASSESSMENT — PATIENT HEALTH QUESTIONNAIRE - PHQ9
1. LITTLE INTEREST OR PLEASURE IN DOING THINGS: NOT AT ALL
2. FEELING DOWN, DEPRESSED OR HOPELESS: NOT AT ALL
SUM OF ALL RESPONSES TO PHQ QUESTIONS 1-9: 0

## 2025-03-11 NOTE — PATIENT INSTRUCTIONS
Patient Information from Today's Visit    The members of your Oncology Medical Home are listed below:    Physician Provider: Dr. Dariel Gardiner, Medical Oncologist  Advanced Practice Clinician: N/A  Registered Nurse: Cary ARGUETA RN  Nurse Navigator: Saba HANDY RN  Medical Assistant: Rell VALLADARES MA  : Erica MOORE  Supportive Care Services: Rick PINEDA LMSW    Diagnosis: bladder cancer    Follow Up Instructions: 2 weeks with labs    Your next infusion will be scheduled for later this week       Current Labs:   Hospital Outpatient Visit on 03/11/2025   Component Date Value Ref Range Status    WBC 03/11/2025 10.8  4.3 - 11.1 K/uL Final    RBC 03/11/2025 3.74 (L)  4.05 - 5.2 M/uL Final    Hemoglobin 03/11/2025 9.6 (L)  11.7 - 15.4 g/dL Final    Hematocrit 03/11/2025 31.9 (L)  35.8 - 46.3 % Final    MCV 03/11/2025 85.3  82.0 - 102.0 FL Final    MCH 03/11/2025 25.7 (L)  26.1 - 32.9 PG Final    MCHC 03/11/2025 30.1 (L)  31.4 - 35.0 g/dL Final    RDW 03/11/2025 21.2 (H)  11.9 - 14.6 % Final    Platelets 03/11/2025 401  150 - 450 K/uL Final    MPV 03/11/2025 11.4  9.4 - 12.3 FL Final    nRBC 03/11/2025 0.00  0.0 - 0.2 K/uL Final    **Note: Absolute NRBC parameter is now reported with Hemogram**    Neutrophils % 03/11/2025 86.2 (H)  43.0 - 78.0 % Final    Lymphocytes % 03/11/2025 3.5 (L)  13.0 - 44.0 % Final    Monocytes % 03/11/2025 8.4  4.0 - 12.0 % Final    Eosinophils % 03/11/2025 0.8  0.5 - 7.8 % Final    Basophils % 03/11/2025 0.2  0.0 - 2.0 % Final    Immature Granulocytes % 03/11/2025 0.9  0.0 - 5.0 % Final    Neutrophils Absolute 03/11/2025 9.26 (H)  1.70 - 8.20 K/UL Final    Lymphocytes Absolute 03/11/2025 0.38 (L)  0.50 - 4.60 K/UL Final    Monocytes Absolute 03/11/2025 0.90  0.10 - 1.30 K/UL Final    Eosinophils Absolute 03/11/2025 0.09  0.00 - 0.80 K/UL Final    Basophils Absolute 03/11/2025 0.02  0.00 - 0.20 K/UL Final    Immature Granulocytes Absolute 03/11/2025 0.10  0.00 - 0.50 K/UL Final

## 2025-03-11 NOTE — PROGRESS NOTES
Patient to port lab for port access and lab draw. Port accessed per protocol; using 20g 0.75\" de la cruz needle without difficulty. Labs drawn from port and port flushed. Port remains accessed. Patient tolerated well. Discharged via wheelchair.

## 2025-03-11 NOTE — PROGRESS NOTES
Arrived to the Infusion Center.  D1C1 Gemzar completed. Patient tolerated well and observed 30 mins post infusion.    Any issues or concerns during appointment: none.  Patient aware of next infusion appointment on 3/12/25 (date) at 3:15 (time).  Patient instructed to call provider with temperature of 100.4 or greater or nausea/vomiting/ diarrhea or pain not controlled by medications  Discharged via wheelchair with family.

## 2025-03-11 NOTE — PROGRESS NOTES
[facility]  [address] [place] [address]  Dariel Gardiner MD    Patient Name Maria Alejandra Castillo   MRN [id number]   Date 03/11/25     Hematology/Oncology Diagnosis     1. Muscle invasive urothelial carcinoma  2. 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 lady with DM2, anemia, anxiety, COPD, HTN, HLD, smoking history, remote SCC of vulva who presents for follow-up.    - 12/23/24  Cystourethroscopy and large transurethral resection of bladder tumor, Dr. Lacey.  Pathology showed invasive urothelial carcinoma.  -1/5/2025 CT abdomen/pelvis at [facility].  Showed moderate to severe right hydroureteronephrosis with asymmetrically dilated right renal enhancement.  Dilation of right ureter to the level of right UVJ.  Indeterminate right adrenal nodule.  - 1/14/25  Cystoscopy, Dr Lipscomb.  A large amount of white fibrinous material throughout the upper portion of bladder observed, potentially consistent with fungus ball.  TURBT postponed.  Serna replaced.  - 1/15/24  Percutaneous nephrostomy tube placement.   - 1/30/24  TURBT, Dr Lipscomb.  Cystoscopy showed no signs of infection.  Large tumor involving over half of the bladder floor extending across the right trigone and up to the right sidewall observed.  Area of resection about 7 cm.  - 2/26/25  PETCT showed no metastatic disease.    Interval History  Presents for scheduled follow-up.  Started IV iron 3/4.  With her daughter-in-law today.  Doing relatively well.  Tolerated iron without complaints.  Scheduling has been an issue given daily radiation for several weeks.  Otherwise, no new complaints.    ROS   12 point review of system negative except as noted in HPI    Past Medical History:   Diagnosis Date    Anemia     oral Fe--per patient 1/14/25 recent blood transfusion    Anxiety     hx of anxiety    Cancer (HCC)  dx 12/2014    vulvar cancer- surgical removal-  \"contained\" - no further treatment

## 2025-03-11 NOTE — PROGRESS NOTES
Spiritual Health History and Assessment/Progress Note  [facility]    Initial Encounter       Name: Maria Alejandra Castillo MRN: [id number]    Age: 79 y.o.     Sex: female   Language: English   Hindu: Jainism   <principal problem not specified>     Date: 3/11/2025                        Spiritual Assessment began in [facility]            Encounter Overview/Reason: Initial Encounter  Service Provided For: Patient and family together    Galilea, Belief, Meaning:   Patient is connected with a galilea tradition or spiritual practice and has beliefs or practices that help with coping during difficult times  Family/Friends are connected with a galilea tradition or spiritual practice and have beliefs or practices that help with coping during difficult times      Importance and Influence:  Patient has spiritual/personal beliefs that influence decisions regarding their health  Family/Friends have spiritual/personal beliefs that influence decisions regarding the patient's health    Community:  Patient feels well-supported. Support system includes: Children, Friends, and Extended family  Family/Friends feel well-supported. Support system includes: Spouse/Partner, Children, Friends, and Extended family    Assessment and Plan of Care:     Patient Interventions include: Facilitated expression of thoughts and feelings, Explored spiritual coping/struggle/distress, Engaged in theological reflection, Affirmed coping skills/support systems, Provided sacramental/Hinduism ritual, and Facilitated life review and/ or legacy  Family/Friends Interventions include: Provided sacramental/Hinduism ritual (prayer)    Patient Plan of Care: Spiritual Care available upon further referral  Family/Friends Plan of Care: Spiritual Care available upon further referral    Electronically signed by VITOR Dejesus on 3/11/2025 at 11:47 AM

## 2025-03-12 ENCOUNTER — HOSPITAL ENCOUNTER (OUTPATIENT)
Dept: RADIATION ONCOLOGY | Age: 80
Setting detail: RECURRING SERIES
Discharge: HOME | End: 2025-03-15
Payer: MEDICARE

## 2025-03-12 ENCOUNTER — CLINICAL DOCUMENTATION (OUTPATIENT)
Dept: CASE MANAGEMENT | Age: 80
End: 2025-03-12

## 2025-03-12 ENCOUNTER — HOSPITAL ENCOUNTER (OUTPATIENT)
Dept: INFUSION THERAPY | Age: 80
Setting detail: INFUSION SERIES
Discharge: HOME | End: 2025-03-12
Payer: MEDICARE

## 2025-03-12 VITALS
HEART RATE: 67 BPM | RESPIRATION RATE: 16 BRPM | DIASTOLIC BLOOD PRESSURE: 51 MMHG | OXYGEN SATURATION: 97 % | SYSTOLIC BLOOD PRESSURE: 120 MMHG | TEMPERATURE: 97.6 F

## 2025-03-12 DIAGNOSIS — D50.0 IRON DEFICIENCY ANEMIA DUE TO CHRONIC BLOOD LOSS: Primary | ICD-10-CM

## 2025-03-12 LAB
HBV CORE AB SERPL QL IA: NEGATIVE
RAD ONC ARIA COURSE FIRST TREATMENT DATE: NORMAL
RAD ONC ARIA COURSE ID: NORMAL
RAD ONC ARIA COURSE INTENT: NORMAL
RAD ONC ARIA COURSE LAST TREATMENT DATE: NORMAL
RAD ONC ARIA COURSE SESSION NUMBER: 2
RAD ONC ARIA COURSE START DATE: NORMAL
RAD ONC ARIA COURSE TREATMENT ELAPSED DAYS: 1
RAD ONC ARIA PLAN FRACTIONS TREATED TO DATE: 2
RAD ONC ARIA PLAN ID: NORMAL
RAD ONC ARIA PLAN PRESCRIBED DOSE PER FRACTION: 1.8 GY
RAD ONC ARIA PLAN PRIMARY REFERENCE POINT: NORMAL
RAD ONC ARIA PLAN TOTAL FRACTIONS PRESCRIBED: 28
RAD ONC ARIA PLAN TOTAL PRESCRIBED DOSE: 5040 CGY
RAD ONC ARIA REFERENCE POINT DOSAGE GIVEN TO DATE: 3.6 GY
RAD ONC ARIA REFERENCE POINT ID: NORMAL
RAD ONC ARIA REFERENCE POINT SESSION DOSAGE GIVEN: 1.8 GY

## 2025-03-12 PROCEDURE — 6360000002 HC RX W HCPCS: Performed by: INTERNAL MEDICINE

## 2025-03-12 PROCEDURE — 2580000003 HC RX 258: Performed by: INTERNAL MEDICINE

## 2025-03-12 PROCEDURE — 96365 THER/PROPH/DIAG IV INF INIT: CPT

## 2025-03-12 PROCEDURE — 77386 HC NTSTY MODUL RAD TX DLVR CPLX: CPT

## 2025-03-12 RX ORDER — ACETAMINOPHEN 325 MG/1
650 TABLET ORAL
Status: DISCONTINUED | OUTPATIENT
Start: 2025-03-12 | End: 2025-03-13 | Stop reason: HOSPADM

## 2025-03-12 RX ORDER — SODIUM CHLORIDE 0.9 % (FLUSH) 0.9 %
5-40 SYRINGE (ML) INJECTION PRN
OUTPATIENT
Start: 2025-04-01

## 2025-03-12 RX ORDER — ONDANSETRON 2 MG/ML
8 INJECTION INTRAMUSCULAR; INTRAVENOUS
Status: DISCONTINUED | OUTPATIENT
Start: 2025-03-12 | End: 2025-03-13 | Stop reason: HOSPADM

## 2025-03-12 RX ORDER — EPINEPHRINE 1 MG/ML
0.3 INJECTION, SOLUTION, CONCENTRATE INTRAVENOUS PRN
Status: DISCONTINUED | OUTPATIENT
Start: 2025-03-12 | End: 2025-03-13 | Stop reason: HOSPADM

## 2025-03-12 RX ORDER — ALBUTEROL SULFATE 90 UG/1
4 INHALANT RESPIRATORY (INHALATION) PRN
OUTPATIENT
Start: 2025-04-01

## 2025-03-12 RX ORDER — HYDROCORTISONE SODIUM SUCCINATE 100 MG/2ML
100 INJECTION INTRAMUSCULAR; INTRAVENOUS
OUTPATIENT
Start: 2025-04-01

## 2025-03-12 RX ORDER — SODIUM CHLORIDE 9 MG/ML
5-250 INJECTION, SOLUTION INTRAVENOUS PRN
OUTPATIENT
Start: 2025-04-01

## 2025-03-12 RX ORDER — ALBUTEROL SULFATE 90 UG/1
4 INHALANT RESPIRATORY (INHALATION) PRN
Status: DISCONTINUED | OUTPATIENT
Start: 2025-03-12 | End: 2025-03-13 | Stop reason: HOSPADM

## 2025-03-12 RX ORDER — HEPARIN 100 UNIT/ML
500 SYRINGE INTRAVENOUS PRN
OUTPATIENT
Start: 2025-04-01

## 2025-03-12 RX ORDER — DIPHENHYDRAMINE HYDROCHLORIDE 50 MG/ML
50 INJECTION, SOLUTION INTRAMUSCULAR; INTRAVENOUS
OUTPATIENT
Start: 2025-04-01

## 2025-03-12 RX ORDER — SODIUM CHLORIDE 0.9 % (FLUSH) 0.9 %
5-40 SYRINGE (ML) INJECTION PRN
Status: DISCONTINUED | OUTPATIENT
Start: 2025-03-12 | End: 2025-03-13 | Stop reason: HOSPADM

## 2025-03-12 RX ORDER — SODIUM CHLORIDE 9 MG/ML
5-250 INJECTION, SOLUTION INTRAVENOUS PRN
Status: DISCONTINUED | OUTPATIENT
Start: 2025-03-12 | End: 2025-03-13 | Stop reason: HOSPADM

## 2025-03-12 RX ORDER — ACETAMINOPHEN 325 MG/1
650 TABLET ORAL
OUTPATIENT
Start: 2025-04-01

## 2025-03-12 RX ORDER — SODIUM CHLORIDE 9 MG/ML
INJECTION, SOLUTION INTRAVENOUS CONTINUOUS
OUTPATIENT
Start: 2025-04-01

## 2025-03-12 RX ORDER — EPINEPHRINE 1 MG/ML
0.3 INJECTION, SOLUTION, CONCENTRATE INTRAVENOUS PRN
OUTPATIENT
Start: 2025-04-01

## 2025-03-12 RX ORDER — ONDANSETRON 2 MG/ML
8 INJECTION INTRAMUSCULAR; INTRAVENOUS
OUTPATIENT
Start: 2025-04-01

## 2025-03-12 RX ORDER — HYDROCORTISONE SODIUM SUCCINATE 100 MG/2ML
100 INJECTION INTRAMUSCULAR; INTRAVENOUS
Status: DISCONTINUED | OUTPATIENT
Start: 2025-03-12 | End: 2025-03-13 | Stop reason: HOSPADM

## 2025-03-12 RX ORDER — DIPHENHYDRAMINE HYDROCHLORIDE 50 MG/ML
50 INJECTION, SOLUTION INTRAMUSCULAR; INTRAVENOUS
Status: DISCONTINUED | OUTPATIENT
Start: 2025-03-12 | End: 2025-03-13 | Stop reason: HOSPADM

## 2025-03-12 RX ADMIN — IRON SUCROSE 300 MG: 20 INJECTION, SOLUTION INTRAVENOUS at 15:26

## 2025-03-12 NOTE — PROGRESS NOTES
Arrived to the Infusion Center.  Venofer completed. Patient tolerated well and observed 30 mins post infusion   Any issues or concerns during appointment: none.  Patient aware of next infusion appointment on 03/14/25 (date) at 1:45 (time).  Patient instructed to call provider with temperature of 100.4 or greater or nausea/vomiting/ diarrhea or pain not controlled by medications  Discharged ambulatory.

## 2025-03-12 NOTE — PROGRESS NOTES
Nurse Navigation outreach related to post C1D1 symptom management    Other Called and left VM with contact information requesting a call back.    Goal of next outreach: Status check  Time Spent: 2 minutes

## 2025-03-13 ENCOUNTER — HOSPITAL ENCOUNTER (OUTPATIENT)
Dept: RADIATION ONCOLOGY | Age: 80
Setting detail: RECURRING SERIES
Discharge: HOME | End: 2025-03-16
Payer: MEDICARE

## 2025-03-13 LAB
RAD ONC ARIA COURSE FIRST TREATMENT DATE: NORMAL
RAD ONC ARIA COURSE ID: NORMAL
RAD ONC ARIA COURSE INTENT: NORMAL
RAD ONC ARIA COURSE LAST TREATMENT DATE: NORMAL
RAD ONC ARIA COURSE SESSION NUMBER: 3
RAD ONC ARIA COURSE START DATE: NORMAL
RAD ONC ARIA COURSE TREATMENT ELAPSED DAYS: 2
RAD ONC ARIA PLAN FRACTIONS TREATED TO DATE: 3
RAD ONC ARIA PLAN ID: NORMAL
RAD ONC ARIA PLAN PRESCRIBED DOSE PER FRACTION: 1.8 GY
RAD ONC ARIA PLAN PRIMARY REFERENCE POINT: NORMAL
RAD ONC ARIA PLAN TOTAL FRACTIONS PRESCRIBED: 28
RAD ONC ARIA PLAN TOTAL PRESCRIBED DOSE: 5040 CGY
RAD ONC ARIA REFERENCE POINT DOSAGE GIVEN TO DATE: 5.4 GY
RAD ONC ARIA REFERENCE POINT ID: NORMAL
RAD ONC ARIA REFERENCE POINT SESSION DOSAGE GIVEN: 1.8 GY

## 2025-03-13 PROCEDURE — 77386 HC NTSTY MODUL RAD TX DLVR CPLX: CPT

## 2025-03-14 ENCOUNTER — HOSPITAL ENCOUNTER (OUTPATIENT)
Dept: INFUSION THERAPY | Age: 80
Setting detail: INFUSION SERIES
Discharge: HOME | End: 2025-03-14
Payer: MEDICARE

## 2025-03-14 ENCOUNTER — HOSPITAL ENCOUNTER (OUTPATIENT)
Dept: RADIATION ONCOLOGY | Age: 80
Setting detail: RECURRING SERIES
Discharge: HOME | End: 2025-03-17
Payer: MEDICARE

## 2025-03-14 ENCOUNTER — HOSPITAL ENCOUNTER (OUTPATIENT)
Dept: LAB | Age: 80
Discharge: HOME | End: 2025-03-14
Payer: MEDICARE

## 2025-03-14 VITALS
BODY MASS INDEX: 23.63 KG/M2 | DIASTOLIC BLOOD PRESSURE: 74 MMHG | HEART RATE: 52 BPM | TEMPERATURE: 97.4 F | SYSTOLIC BLOOD PRESSURE: 149 MMHG | OXYGEN SATURATION: 97 % | RESPIRATION RATE: 16 BRPM | WEIGHT: 117 LBS

## 2025-03-14 DIAGNOSIS — C67.9 UROTHELIAL CARCINOMA OF BLADDER WITH INVASION OF MUSCLE: Primary | ICD-10-CM

## 2025-03-14 LAB
ALBUMIN SERPL-MCNC: 2.5 G/DL (ref 3.2–4.6)
ALBUMIN/GLOB SERPL: 0.6 (ref 1–1.9)
ALP SERPL-CCNC: 105 U/L (ref 35–104)
ALT SERPL-CCNC: 13 U/L (ref 8–45)
ANION GAP SERPL CALC-SCNC: 11 MMOL/L (ref 7–16)
AST SERPL-CCNC: 14 U/L (ref 15–37)
BASOPHILS # BLD: 0.02 K/UL (ref 0–0.2)
BASOPHILS NFR BLD: 0.2 % (ref 0–2)
BILIRUB SERPL-MCNC: <0.2 MG/DL (ref 0–1.2)
BUN SERPL-MCNC: 20 MG/DL (ref 8–23)
CALCIUM SERPL-MCNC: 9.5 MG/DL (ref 8.8–10.2)
CHLORIDE SERPL-SCNC: 100 MMOL/L (ref 98–107)
CO2 SERPL-SCNC: 28 MMOL/L (ref 20–29)
CREAT SERPL-MCNC: 0.81 MG/DL (ref 0.6–1.1)
DIFFERENTIAL METHOD BLD: ABNORMAL
EOSINOPHIL # BLD: 0.11 K/UL (ref 0–0.8)
EOSINOPHIL NFR BLD: 1.2 % (ref 0.5–7.8)
ERYTHROCYTE [DISTWIDTH] IN BLOOD BY AUTOMATED COUNT: 20.8 % (ref 11.9–14.6)
GLOBULIN SER CALC-MCNC: 4.2 G/DL (ref 2.3–3.5)
GLUCOSE SERPL-MCNC: 255 MG/DL (ref 70–99)
HCT VFR BLD AUTO: 31.6 % (ref 35.8–46.3)
HGB BLD-MCNC: 9.4 G/DL (ref 11.7–15.4)
IMM GRANULOCYTES # BLD AUTO: 0.17 K/UL (ref 0–0.5)
IMM GRANULOCYTES NFR BLD AUTO: 1.8 % (ref 0–5)
LYMPHOCYTES # BLD: 0.49 K/UL (ref 0.5–4.6)
LYMPHOCYTES NFR BLD: 5.3 % (ref 13–44)
MCH RBC QN AUTO: 25.3 PG (ref 26.1–32.9)
MCHC RBC AUTO-ENTMCNC: 29.7 G/DL (ref 31.4–35)
MCV RBC AUTO: 85.2 FL (ref 82–102)
MONOCYTES # BLD: 0.29 K/UL (ref 0.1–1.3)
MONOCYTES NFR BLD: 3.1 % (ref 4–12)
NEUTS SEG # BLD: 8.17 K/UL (ref 1.7–8.2)
NEUTS SEG NFR BLD: 88.4 % (ref 43–78)
NRBC # BLD: 0 K/UL (ref 0–0.2)
PLATELET # BLD AUTO: 542 K/UL (ref 150–450)
PMV BLD AUTO: 11.1 FL (ref 9.4–12.3)
POTASSIUM SERPL-SCNC: 3.5 MMOL/L (ref 3.5–5.1)
PROT SERPL-MCNC: 6.7 G/DL (ref 6.3–8.2)
RAD ONC ARIA COURSE FIRST TREATMENT DATE: NORMAL
RAD ONC ARIA COURSE ID: NORMAL
RAD ONC ARIA COURSE INTENT: NORMAL
RAD ONC ARIA COURSE LAST TREATMENT DATE: NORMAL
RAD ONC ARIA COURSE SESSION NUMBER: 4
RAD ONC ARIA COURSE START DATE: NORMAL
RAD ONC ARIA COURSE TREATMENT ELAPSED DAYS: 3
RAD ONC ARIA PLAN FRACTIONS TREATED TO DATE: 4
RAD ONC ARIA PLAN ID: NORMAL
RAD ONC ARIA PLAN PRESCRIBED DOSE PER FRACTION: 1.8 GY
RAD ONC ARIA PLAN PRIMARY REFERENCE POINT: NORMAL
RAD ONC ARIA PLAN TOTAL FRACTIONS PRESCRIBED: 28
RAD ONC ARIA PLAN TOTAL PRESCRIBED DOSE: 5040 CGY
RAD ONC ARIA REFERENCE POINT DOSAGE GIVEN TO DATE: 7.2 GY
RAD ONC ARIA REFERENCE POINT ID: NORMAL
RAD ONC ARIA REFERENCE POINT SESSION DOSAGE GIVEN: 1.8 GY
RBC # BLD AUTO: 3.71 M/UL (ref 4.05–5.2)
SODIUM SERPL-SCNC: 139 MMOL/L (ref 136–145)
WBC # BLD AUTO: 9.3 K/UL (ref 4.3–11.1)

## 2025-03-14 PROCEDURE — 96413 CHEMO IV INFUSION 1 HR: CPT

## 2025-03-14 PROCEDURE — 96375 TX/PRO/DX INJ NEW DRUG ADDON: CPT

## 2025-03-14 PROCEDURE — 77386 HC NTSTY MODUL RAD TX DLVR CPLX: CPT

## 2025-03-14 PROCEDURE — 2500000003 HC RX 250 WO HCPCS: Performed by: INTERNAL MEDICINE

## 2025-03-14 PROCEDURE — 2580000003 HC RX 258: Performed by: INTERNAL MEDICINE

## 2025-03-14 PROCEDURE — 80053 COMPREHEN METABOLIC PANEL: CPT

## 2025-03-14 PROCEDURE — 85025 COMPLETE CBC W/AUTO DIFF WBC: CPT

## 2025-03-14 PROCEDURE — 6360000002 HC RX W HCPCS: Performed by: INTERNAL MEDICINE

## 2025-03-14 RX ORDER — ONDANSETRON 4 MG/1
4 TABLET, ORALLY DISINTEGRATING ORAL 3 TIMES DAILY PRN
Qty: 21 TABLET | Refills: 0 | Status: SHIPPED | OUTPATIENT
Start: 2025-03-14

## 2025-03-14 RX ORDER — ONDANSETRON 2 MG/ML
8 INJECTION INTRAMUSCULAR; INTRAVENOUS ONCE
Status: COMPLETED | OUTPATIENT
Start: 2025-03-14 | End: 2025-03-14

## 2025-03-14 RX ORDER — SODIUM CHLORIDE 0.9 % (FLUSH) 0.9 %
5-40 SYRINGE (ML) INJECTION PRN
Status: DISCONTINUED | OUTPATIENT
Start: 2025-03-14 | End: 2025-03-15 | Stop reason: HOSPADM

## 2025-03-14 RX ORDER — DIPHENHYDRAMINE HYDROCHLORIDE 50 MG/ML
50 INJECTION, SOLUTION INTRAMUSCULAR; INTRAVENOUS
Status: DISCONTINUED | OUTPATIENT
Start: 2025-03-14 | End: 2025-03-15 | Stop reason: HOSPADM

## 2025-03-14 RX ORDER — HYDROCORTISONE SODIUM SUCCINATE 100 MG/2ML
100 INJECTION INTRAMUSCULAR; INTRAVENOUS
Status: DISCONTINUED | OUTPATIENT
Start: 2025-03-14 | End: 2025-03-15 | Stop reason: HOSPADM

## 2025-03-14 RX ADMIN — SODIUM CHLORIDE, PRESERVATIVE FREE 20 ML: 5 INJECTION INTRAVENOUS at 12:30

## 2025-03-14 RX ADMIN — GEMCITABINE HYDROCHLORIDE 41 MG: 200 INJECTION, POWDER, LYOPHILIZED, FOR SOLUTION INTRAVENOUS at 13:48

## 2025-03-14 RX ADMIN — SODIUM CHLORIDE, PRESERVATIVE FREE 10 ML: 5 INJECTION INTRAVENOUS at 14:18

## 2025-03-14 RX ADMIN — ONDANSETRON 8 MG: 2 INJECTION, SOLUTION INTRAMUSCULAR; INTRAVENOUS at 13:12

## 2025-03-14 RX ADMIN — SODIUM CHLORIDE, PRESERVATIVE FREE 10 ML: 5 INJECTION INTRAVENOUS at 13:12

## 2025-03-14 NOTE — ON TREATMENT VISIT
[facility] RADIATION ONCOLOGY ON TREATMENT VISIT    Patient: Maria Alejandra Castillo MRN: [id number]  SSN: [id number]    YOB: 1945  Age: 79 y.o.  Sex: female      03/14/25    Diagnosis:  Cancer Staging   No matching staging information was found for the patient.      This is a 79 y.o. female who is currently receiving trimodality therapy for MIBC.    Current RT dose: 7.2/64.8 Gy in 4/36 fractions.     Concurrent systemic therapy: Gemzar    Subjective:  Week 1: No complaints apart from some nausea after her chemotherapy.    Objective:  There were no vitals filed for this visit.  Pain 0/10    General: Alert and conversant, in NAD  Skin: no issues    Assessment:  Patient is tolerating radiation therapy well with no XRT issues.    Plan:  -Zofran for nausea.  -Continue RT as planned.  -Treatment images reviewed.  -The patient has a documented plan of care to address pain.  Pain is not present.      Rene Kennedy MD  03/14/25

## 2025-03-14 NOTE — PROGRESS NOTES
Arrived to the Infusion Center.  Gemzar completed. Patient tolerated well.   Any issues or concerns during appointment: none.  Patient aware of next infusion appointment on 3/18 (date) at 1:00 PM (time).  Patient instructed to call provider with temperature of 100.4 or greater or nausea/vomiting/ diarrhea or pain not controlled by medications  Discharged via w/c.

## 2025-03-17 ENCOUNTER — HOSPITAL ENCOUNTER (OUTPATIENT)
Dept: RADIATION ONCOLOGY | Age: 80
Setting detail: RECURRING SERIES
Discharge: HOME | End: 2025-03-20
Payer: MEDICARE

## 2025-03-17 LAB
RAD ONC ARIA COURSE FIRST TREATMENT DATE: NORMAL
RAD ONC ARIA COURSE ID: NORMAL
RAD ONC ARIA COURSE INTENT: NORMAL
RAD ONC ARIA COURSE LAST TREATMENT DATE: NORMAL
RAD ONC ARIA COURSE SESSION NUMBER: 5
RAD ONC ARIA COURSE START DATE: NORMAL
RAD ONC ARIA COURSE TREATMENT ELAPSED DAYS: 6
RAD ONC ARIA PLAN FRACTIONS TREATED TO DATE: 5
RAD ONC ARIA PLAN ID: NORMAL
RAD ONC ARIA PLAN PRESCRIBED DOSE PER FRACTION: 1.8 GY
RAD ONC ARIA PLAN PRIMARY REFERENCE POINT: NORMAL
RAD ONC ARIA PLAN TOTAL FRACTIONS PRESCRIBED: 28
RAD ONC ARIA PLAN TOTAL PRESCRIBED DOSE: 5040 CGY
RAD ONC ARIA REFERENCE POINT DOSAGE GIVEN TO DATE: 9 GY
RAD ONC ARIA REFERENCE POINT ID: NORMAL
RAD ONC ARIA REFERENCE POINT SESSION DOSAGE GIVEN: 1.8 GY

## 2025-03-17 PROCEDURE — 77386 HC NTSTY MODUL RAD TX DLVR CPLX: CPT

## 2025-03-18 ENCOUNTER — HOSPITAL ENCOUNTER (OUTPATIENT)
Dept: RADIATION ONCOLOGY | Age: 80
Setting detail: RECURRING SERIES
Discharge: HOME | End: 2025-03-21
Payer: MEDICARE

## 2025-03-18 ENCOUNTER — HOSPITAL ENCOUNTER (OUTPATIENT)
Dept: INFUSION THERAPY | Age: 80
Setting detail: INFUSION SERIES
Discharge: HOME | End: 2025-03-18
Payer: MEDICARE

## 2025-03-18 ENCOUNTER — HOSPITAL ENCOUNTER (OUTPATIENT)
Dept: LAB | Age: 80
Discharge: HOME | End: 2025-03-18
Payer: MEDICARE

## 2025-03-18 VITALS
HEART RATE: 66 BPM | DIASTOLIC BLOOD PRESSURE: 71 MMHG | TEMPERATURE: 97.4 F | OXYGEN SATURATION: 96 % | SYSTOLIC BLOOD PRESSURE: 155 MMHG | RESPIRATION RATE: 16 BRPM

## 2025-03-18 VITALS — BODY MASS INDEX: 23.85 KG/M2 | WEIGHT: 118.1 LBS

## 2025-03-18 DIAGNOSIS — C67.9 UROTHELIAL CARCINOMA OF BLADDER WITH INVASION OF MUSCLE: ICD-10-CM

## 2025-03-18 DIAGNOSIS — C67.9 UROTHELIAL CARCINOMA OF BLADDER WITH INVASION OF MUSCLE: Primary | ICD-10-CM

## 2025-03-18 LAB
ALBUMIN SERPL-MCNC: 2.7 G/DL (ref 3.2–4.6)
ALBUMIN/GLOB SERPL: 0.7 (ref 1–1.9)
ALP SERPL-CCNC: 98 U/L (ref 35–104)
ALT SERPL-CCNC: 22 U/L (ref 8–45)
ANION GAP SERPL CALC-SCNC: 10 MMOL/L (ref 7–16)
AST SERPL-CCNC: 23 U/L (ref 15–37)
BASOPHILS # BLD: 0.03 K/UL (ref 0–0.2)
BASOPHILS NFR BLD: 0.3 % (ref 0–2)
BILIRUB SERPL-MCNC: <0.2 MG/DL (ref 0–1.2)
BUN SERPL-MCNC: 13 MG/DL (ref 8–23)
CALCIUM SERPL-MCNC: 9 MG/DL (ref 8.8–10.2)
CHLORIDE SERPL-SCNC: 96 MMOL/L (ref 98–107)
CO2 SERPL-SCNC: 28 MMOL/L (ref 20–29)
CREAT SERPL-MCNC: 0.85 MG/DL (ref 0.6–1.1)
DIFFERENTIAL METHOD BLD: ABNORMAL
EOSINOPHIL # BLD: 0.05 K/UL (ref 0–0.8)
EOSINOPHIL NFR BLD: 0.5 % (ref 0.5–7.8)
ERYTHROCYTE [DISTWIDTH] IN BLOOD BY AUTOMATED COUNT: 21.2 % (ref 11.9–14.6)
GLOBULIN SER CALC-MCNC: 3.8 G/DL (ref 2.3–3.5)
GLUCOSE SERPL-MCNC: 208 MG/DL (ref 70–99)
HCT VFR BLD AUTO: 29.9 % (ref 35.8–46.3)
HGB BLD-MCNC: 9 G/DL (ref 11.7–15.4)
IMM GRANULOCYTES # BLD AUTO: 0.48 K/UL (ref 0–0.5)
IMM GRANULOCYTES NFR BLD AUTO: 4.8 % (ref 0–5)
LYMPHOCYTES # BLD: 0.45 K/UL (ref 0.5–4.6)
LYMPHOCYTES NFR BLD: 4.5 % (ref 13–44)
MCH RBC QN AUTO: 26.2 PG (ref 26.1–32.9)
MCHC RBC AUTO-ENTMCNC: 30.1 G/DL (ref 31.4–35)
MCV RBC AUTO: 86.9 FL (ref 82–102)
MONOCYTES # BLD: 0.29 K/UL (ref 0.1–1.3)
MONOCYTES NFR BLD: 2.9 % (ref 4–12)
NEUTS SEG # BLD: 8.6 K/UL (ref 1.7–8.2)
NEUTS SEG NFR BLD: 87 % (ref 43–78)
NRBC # BLD: 0.06 K/UL (ref 0–0.2)
PLATELET # BLD AUTO: 379 K/UL (ref 150–450)
PMV BLD AUTO: 9.3 FL (ref 9.4–12.3)
POTASSIUM SERPL-SCNC: 3.8 MMOL/L (ref 3.5–5.1)
PROT SERPL-MCNC: 6.5 G/DL (ref 6.3–8.2)
RAD ONC ARIA COURSE FIRST TREATMENT DATE: NORMAL
RAD ONC ARIA COURSE ID: NORMAL
RAD ONC ARIA COURSE INTENT: NORMAL
RAD ONC ARIA COURSE LAST TREATMENT DATE: NORMAL
RAD ONC ARIA COURSE SESSION NUMBER: 6
RAD ONC ARIA COURSE START DATE: NORMAL
RAD ONC ARIA COURSE TREATMENT ELAPSED DAYS: 7
RAD ONC ARIA PLAN FRACTIONS TREATED TO DATE: 6
RAD ONC ARIA PLAN ID: NORMAL
RAD ONC ARIA PLAN PRESCRIBED DOSE PER FRACTION: 1.8 GY
RAD ONC ARIA PLAN PRIMARY REFERENCE POINT: NORMAL
RAD ONC ARIA PLAN TOTAL FRACTIONS PRESCRIBED: 28
RAD ONC ARIA PLAN TOTAL PRESCRIBED DOSE: 5040 CGY
RAD ONC ARIA REFERENCE POINT DOSAGE GIVEN TO DATE: 10.8 GY
RAD ONC ARIA REFERENCE POINT ID: NORMAL
RAD ONC ARIA REFERENCE POINT SESSION DOSAGE GIVEN: 1.8 GY
RBC # BLD AUTO: 3.44 M/UL (ref 4.05–5.2)
SODIUM SERPL-SCNC: 134 MMOL/L (ref 136–145)
WBC # BLD AUTO: 9.9 K/UL (ref 4.3–11.1)

## 2025-03-18 PROCEDURE — 77336 RADIATION PHYSICS CONSULT: CPT

## 2025-03-18 PROCEDURE — 2500000003 HC RX 250 WO HCPCS: Performed by: INTERNAL MEDICINE

## 2025-03-18 PROCEDURE — 80053 COMPREHEN METABOLIC PANEL: CPT

## 2025-03-18 PROCEDURE — 2580000003 HC RX 258: Performed by: INTERNAL MEDICINE

## 2025-03-18 PROCEDURE — 85025 COMPLETE CBC W/AUTO DIFF WBC: CPT

## 2025-03-18 PROCEDURE — 96413 CHEMO IV INFUSION 1 HR: CPT

## 2025-03-18 PROCEDURE — 77386 HC NTSTY MODUL RAD TX DLVR CPLX: CPT

## 2025-03-18 PROCEDURE — 6360000002 HC RX W HCPCS: Performed by: INTERNAL MEDICINE

## 2025-03-18 PROCEDURE — 96375 TX/PRO/DX INJ NEW DRUG ADDON: CPT

## 2025-03-18 RX ORDER — MEPERIDINE HYDROCHLORIDE 25 MG/ML
12.5 INJECTION INTRAMUSCULAR; INTRAVENOUS; SUBCUTANEOUS PRN
Status: DISCONTINUED | OUTPATIENT
Start: 2025-03-18 | End: 2025-03-19 | Stop reason: HOSPADM

## 2025-03-18 RX ORDER — ACETAMINOPHEN 325 MG/1
650 TABLET ORAL
Status: DISCONTINUED | OUTPATIENT
Start: 2025-03-18 | End: 2025-03-19 | Stop reason: HOSPADM

## 2025-03-18 RX ORDER — ONDANSETRON 2 MG/ML
8 INJECTION INTRAMUSCULAR; INTRAVENOUS ONCE
Status: COMPLETED | OUTPATIENT
Start: 2025-03-18 | End: 2025-03-18

## 2025-03-18 RX ORDER — EPINEPHRINE 1 MG/ML
0.3 INJECTION, SOLUTION, CONCENTRATE INTRAVENOUS PRN
Status: DISCONTINUED | OUTPATIENT
Start: 2025-03-18 | End: 2025-03-19 | Stop reason: HOSPADM

## 2025-03-18 RX ORDER — SODIUM CHLORIDE 0.9 % (FLUSH) 0.9 %
5-40 SYRINGE (ML) INJECTION PRN
Status: DISCONTINUED | OUTPATIENT
Start: 2025-03-18 | End: 2025-03-19 | Stop reason: HOSPADM

## 2025-03-18 RX ORDER — ONDANSETRON 2 MG/ML
8 INJECTION INTRAMUSCULAR; INTRAVENOUS
Status: DISCONTINUED | OUTPATIENT
Start: 2025-03-18 | End: 2025-03-19 | Stop reason: HOSPADM

## 2025-03-18 RX ORDER — SODIUM CHLORIDE 9 MG/ML
5-250 INJECTION, SOLUTION INTRAVENOUS PRN
Status: DISCONTINUED | OUTPATIENT
Start: 2025-03-18 | End: 2025-03-19 | Stop reason: HOSPADM

## 2025-03-18 RX ORDER — DIPHENHYDRAMINE HYDROCHLORIDE 50 MG/ML
50 INJECTION, SOLUTION INTRAMUSCULAR; INTRAVENOUS
Status: DISCONTINUED | OUTPATIENT
Start: 2025-03-18 | End: 2025-03-19 | Stop reason: HOSPADM

## 2025-03-18 RX ORDER — ALBUTEROL SULFATE 90 UG/1
4 INHALANT RESPIRATORY (INHALATION) PRN
Status: DISCONTINUED | OUTPATIENT
Start: 2025-03-18 | End: 2025-03-19 | Stop reason: HOSPADM

## 2025-03-18 RX ORDER — HYDROCORTISONE SODIUM SUCCINATE 100 MG/2ML
100 INJECTION INTRAMUSCULAR; INTRAVENOUS
Status: DISCONTINUED | OUTPATIENT
Start: 2025-03-18 | End: 2025-03-19 | Stop reason: HOSPADM

## 2025-03-18 RX ADMIN — SODIUM CHLORIDE, PRESERVATIVE FREE 10 ML: 5 INJECTION INTRAVENOUS at 13:14

## 2025-03-18 RX ADMIN — ONDANSETRON 8 MG: 2 INJECTION, SOLUTION INTRAMUSCULAR; INTRAVENOUS at 12:32

## 2025-03-18 RX ADMIN — SODIUM CHLORIDE, PRESERVATIVE FREE 10 ML: 5 INJECTION INTRAVENOUS at 11:41

## 2025-03-18 RX ADMIN — GEMCITABINE HYDROCHLORIDE 41 MG: 200 INJECTION, POWDER, LYOPHILIZED, FOR SOLUTION INTRAVENOUS at 12:45

## 2025-03-18 NOTE — PROGRESS NOTES
Pt arrived via w/c.  Premeds and Gemzar completed without complications.  Pt escorted downstairs via w/c, awaiting son for transportation home.  Pt aware of next appt 3/21 at 1245.  No distress noted.  Patient instructed to call provider with temperature of 100.4 or greater or nausea/vomiting/ diarrhea or pain not controlled by medications

## 2025-03-18 NOTE — PROGRESS NOTES
Patient to port lab for port access and lab draw. Port accessed using 20g 0.75\" de la cruz needle without difficulty. Labs drawn from port and port flushed. Port remains accessed. Patient tolerated well. Discharged via wheelchair.    
[id number]

## 2025-03-19 ENCOUNTER — HOSPITAL ENCOUNTER (OUTPATIENT)
Dept: RADIATION ONCOLOGY | Age: 80
Setting detail: RECURRING SERIES
Discharge: HOME | End: 2025-03-22
Payer: MEDICARE

## 2025-03-19 LAB
RAD ONC ARIA COURSE FIRST TREATMENT DATE: NORMAL
RAD ONC ARIA COURSE ID: NORMAL
RAD ONC ARIA COURSE INTENT: NORMAL
RAD ONC ARIA COURSE LAST TREATMENT DATE: NORMAL
RAD ONC ARIA COURSE SESSION NUMBER: 7
RAD ONC ARIA COURSE START DATE: NORMAL
RAD ONC ARIA COURSE TREATMENT ELAPSED DAYS: 8
RAD ONC ARIA PLAN FRACTIONS TREATED TO DATE: 7
RAD ONC ARIA PLAN ID: NORMAL
RAD ONC ARIA PLAN PRESCRIBED DOSE PER FRACTION: 1.8 GY
RAD ONC ARIA PLAN PRIMARY REFERENCE POINT: NORMAL
RAD ONC ARIA PLAN TOTAL FRACTIONS PRESCRIBED: 28
RAD ONC ARIA PLAN TOTAL PRESCRIBED DOSE: 5040 CGY
RAD ONC ARIA REFERENCE POINT DOSAGE GIVEN TO DATE: 12.6 GY
RAD ONC ARIA REFERENCE POINT ID: NORMAL
RAD ONC ARIA REFERENCE POINT SESSION DOSAGE GIVEN: 1.8 GY

## 2025-03-19 PROCEDURE — 77386 HC NTSTY MODUL RAD TX DLVR CPLX: CPT

## 2025-03-20 ENCOUNTER — HOSPITAL ENCOUNTER (OUTPATIENT)
Dept: RADIATION ONCOLOGY | Age: 80
Setting detail: RECURRING SERIES
Discharge: HOME | End: 2025-03-23
Payer: MEDICARE

## 2025-03-20 LAB
RAD ONC ARIA COURSE FIRST TREATMENT DATE: NORMAL
RAD ONC ARIA COURSE ID: NORMAL
RAD ONC ARIA COURSE INTENT: NORMAL
RAD ONC ARIA COURSE LAST TREATMENT DATE: NORMAL
RAD ONC ARIA COURSE SESSION NUMBER: 8
RAD ONC ARIA COURSE START DATE: NORMAL
RAD ONC ARIA COURSE TREATMENT ELAPSED DAYS: 9
RAD ONC ARIA PLAN FRACTIONS TREATED TO DATE: 8
RAD ONC ARIA PLAN ID: NORMAL
RAD ONC ARIA PLAN PRESCRIBED DOSE PER FRACTION: 1.8 GY
RAD ONC ARIA PLAN PRIMARY REFERENCE POINT: NORMAL
RAD ONC ARIA PLAN TOTAL FRACTIONS PRESCRIBED: 28
RAD ONC ARIA PLAN TOTAL PRESCRIBED DOSE: 5040 CGY
RAD ONC ARIA REFERENCE POINT DOSAGE GIVEN TO DATE: 14.4 GY
RAD ONC ARIA REFERENCE POINT ID: NORMAL
RAD ONC ARIA REFERENCE POINT SESSION DOSAGE GIVEN: 1.8 GY

## 2025-03-20 PROCEDURE — 77386 HC NTSTY MODUL RAD TX DLVR CPLX: CPT

## 2025-03-21 ENCOUNTER — HOSPITAL ENCOUNTER (OUTPATIENT)
Dept: INFUSION THERAPY | Age: 80
Setting detail: INFUSION SERIES
End: 2025-03-21

## 2025-03-21 ENCOUNTER — APPOINTMENT (OUTPATIENT)
Dept: RADIATION ONCOLOGY | Age: 80
End: 2025-03-21
Payer: MEDICARE

## 2025-03-24 ENCOUNTER — TELEPHONE (OUTPATIENT)
Dept: ONCOLOGY | Age: 80
End: 2025-03-24

## 2025-03-24 ENCOUNTER — HOSPITAL ENCOUNTER (OUTPATIENT)
Dept: RADIATION ONCOLOGY | Age: 80
Setting detail: RECURRING SERIES
Discharge: HOME | End: 2025-03-27
Payer: MEDICARE

## 2025-03-24 VITALS
DIASTOLIC BLOOD PRESSURE: 63 MMHG | OXYGEN SATURATION: 97 % | HEART RATE: 64 BPM | SYSTOLIC BLOOD PRESSURE: 180 MMHG | TEMPERATURE: 97.7 F | BODY MASS INDEX: 23.43 KG/M2 | WEIGHT: 116 LBS

## 2025-03-24 LAB
RAD ONC ARIA COURSE FIRST TREATMENT DATE: NORMAL
RAD ONC ARIA COURSE ID: NORMAL
RAD ONC ARIA COURSE INTENT: NORMAL
RAD ONC ARIA COURSE LAST TREATMENT DATE: NORMAL
RAD ONC ARIA COURSE SESSION NUMBER: 9
RAD ONC ARIA COURSE START DATE: NORMAL
RAD ONC ARIA COURSE TREATMENT ELAPSED DAYS: 13
RAD ONC ARIA PLAN FRACTIONS TREATED TO DATE: 9
RAD ONC ARIA PLAN ID: NORMAL
RAD ONC ARIA PLAN PRESCRIBED DOSE PER FRACTION: 1.8 GY
RAD ONC ARIA PLAN PRIMARY REFERENCE POINT: NORMAL
RAD ONC ARIA PLAN TOTAL FRACTIONS PRESCRIBED: 28
RAD ONC ARIA PLAN TOTAL PRESCRIBED DOSE: 5040 CGY
RAD ONC ARIA REFERENCE POINT DOSAGE GIVEN TO DATE: 16.2 GY
RAD ONC ARIA REFERENCE POINT ID: NORMAL
RAD ONC ARIA REFERENCE POINT SESSION DOSAGE GIVEN: 1.8 GY

## 2025-03-24 PROCEDURE — 77386 HC NTSTY MODUL RAD TX DLVR CPLX: CPT

## 2025-03-24 NOTE — ON TREATMENT VISIT
[facility] RADIATION ONCOLOGY ON TREATMENT VISIT    Patient: Maria Alejandra Castillo MRN: [id number]  SSN: [id number]    YOB: 1945  Age: 79 y.o.  Sex: female      03/24/25    Diagnosis:   Cancer Staging   No matching staging information was found for the patient.      This is a 79 y.o. female who is currently receiving trimodality therapy for MIBC.    Current RT dose: 16.2/64.8 Gy in 9/36 fractions.     Concurrent systemic therapy: Gemzar    Subjective:  Week 1: No complaints apart from some nausea after her chemotherapy.  Week 2: Had a fall on Thursday evening but did not hit her head. She says she fell on her left side and hit her dresser on the lower left flank. This area is sore but has no other complaints.     Objective:  Vitals:    03/24/25 1114   BP: (!) 180/63   Pulse: 64   Temp: 97.7 °F (36.5 °C)   TempSrc: Oral   SpO2: 97%   Weight: 52.6 kg (116 lb)     Pain 0/10    General: Alert and conversant, in NAD  Skin: no issues    Assessment:  Patient is tolerating radiation therapy well with no XRT issues.    Plan:  -Zofran for nausea.  -Continue RT as planned.  -Treatment images reviewed.  -The patient has a documented plan of care to address pain.  Pain is not present.      Rene Kennedy MD  03/24/25

## 2025-03-24 NOTE — TELEPHONE ENCOUNTER
Called pt's son, Zev, to discuss pt's schedule. Zev is requesting a later appt due to his work schedule and him being the pt's ride to appts. He informed me that he is going to contact Saba, the nurse navigator, to inquire about a ride for pt's appt tomorrow. I informed him that I will reach out to Saba about this, and if we are able to set pt up with a ride, we will leave her schedule alone, and if not, I will contact scheduling to inquire about a later lab visit. Zev appreciated the call.

## 2025-03-25 ENCOUNTER — OFFICE VISIT (OUTPATIENT)
Dept: ONCOLOGY | Age: 80
End: 2025-03-25
Payer: MEDICARE

## 2025-03-25 ENCOUNTER — HOSPITAL ENCOUNTER (OUTPATIENT)
Dept: RADIATION ONCOLOGY | Age: 80
Setting detail: RECURRING SERIES
Discharge: HOME | End: 2025-03-28
Payer: MEDICARE

## 2025-03-25 ENCOUNTER — CLINICAL DOCUMENTATION (OUTPATIENT)
Dept: CASE MANAGEMENT | Age: 80
End: 2025-03-25

## 2025-03-25 ENCOUNTER — HOSPITAL ENCOUNTER (OUTPATIENT)
Dept: INFUSION THERAPY | Age: 80
Setting detail: INFUSION SERIES
Discharge: HOME | End: 2025-03-25
Payer: MEDICARE

## 2025-03-25 ENCOUNTER — HOSPITAL ENCOUNTER (OUTPATIENT)
Dept: LAB | Age: 80
Discharge: HOME | End: 2025-03-25
Payer: MEDICARE

## 2025-03-25 VITALS
OXYGEN SATURATION: 96 % | WEIGHT: 116.4 LBS | RESPIRATION RATE: 16 BRPM | DIASTOLIC BLOOD PRESSURE: 76 MMHG | BODY MASS INDEX: 23.47 KG/M2 | TEMPERATURE: 97.4 F | HEIGHT: 59 IN | HEART RATE: 77 BPM | SYSTOLIC BLOOD PRESSURE: 164 MMHG

## 2025-03-25 DIAGNOSIS — C67.9 UROTHELIAL CARCINOMA OF BLADDER WITH INVASION OF MUSCLE: Primary | ICD-10-CM

## 2025-03-25 DIAGNOSIS — D50.0 IRON DEFICIENCY ANEMIA DUE TO CHRONIC BLOOD LOSS: ICD-10-CM

## 2025-03-25 DIAGNOSIS — C67.9 UROTHELIAL CARCINOMA OF BLADDER WITH INVASION OF MUSCLE: ICD-10-CM

## 2025-03-25 PROBLEM — C68.9 UROTHELIAL CANCER (HCC): Status: RESOLVED | Noted: 2025-03-03 | Resolved: 2025-03-25

## 2025-03-25 LAB
ALBUMIN SERPL-MCNC: 2.5 G/DL (ref 3.2–4.6)
ALBUMIN/GLOB SERPL: 0.7 (ref 1–1.9)
ALP SERPL-CCNC: 99 U/L (ref 35–104)
ALT SERPL-CCNC: 12 U/L (ref 8–45)
ANION GAP SERPL CALC-SCNC: 11 MMOL/L (ref 7–16)
AST SERPL-CCNC: 14 U/L (ref 15–37)
BASOPHILS # BLD: 0.02 K/UL (ref 0–0.2)
BASOPHILS NFR BLD: 0.3 % (ref 0–2)
BILIRUB SERPL-MCNC: <0.2 MG/DL (ref 0–1.2)
BUN SERPL-MCNC: 15 MG/DL (ref 8–23)
CALCIUM SERPL-MCNC: 9.2 MG/DL (ref 8.8–10.2)
CHLORIDE SERPL-SCNC: 98 MMOL/L (ref 98–107)
CO2 SERPL-SCNC: 29 MMOL/L (ref 20–29)
CREAT SERPL-MCNC: 0.78 MG/DL (ref 0.6–1.1)
DIFFERENTIAL METHOD BLD: ABNORMAL
EOSINOPHIL # BLD: 0.02 K/UL (ref 0–0.8)
EOSINOPHIL NFR BLD: 0.3 % (ref 0.5–7.8)
ERYTHROCYTE [DISTWIDTH] IN BLOOD BY AUTOMATED COUNT: 22.5 % (ref 11.9–14.6)
GLOBULIN SER CALC-MCNC: 3.5 G/DL (ref 2.3–3.5)
GLUCOSE SERPL-MCNC: 257 MG/DL (ref 70–99)
HCT VFR BLD AUTO: 30.7 % (ref 35.8–46.3)
HGB BLD-MCNC: 9.1 G/DL (ref 11.7–15.4)
IMM GRANULOCYTES # BLD AUTO: 0.11 K/UL (ref 0–0.5)
IMM GRANULOCYTES NFR BLD AUTO: 1.7 % (ref 0–5)
LYMPHOCYTES # BLD: 0.38 K/UL (ref 0.5–4.6)
LYMPHOCYTES NFR BLD: 5.8 % (ref 13–44)
MCH RBC QN AUTO: 26.2 PG (ref 26.1–32.9)
MCHC RBC AUTO-ENTMCNC: 29.6 G/DL (ref 31.4–35)
MCV RBC AUTO: 88.5 FL (ref 82–102)
MONOCYTES # BLD: 0.55 K/UL (ref 0.1–1.3)
MONOCYTES NFR BLD: 8.4 % (ref 4–12)
NEUTS SEG # BLD: 5.47 K/UL (ref 1.7–8.2)
NEUTS SEG NFR BLD: 83.5 % (ref 43–78)
NRBC # BLD: 0 K/UL (ref 0–0.2)
PLATELET # BLD AUTO: 299 K/UL (ref 150–450)
PMV BLD AUTO: 9.8 FL (ref 9.4–12.3)
POTASSIUM SERPL-SCNC: 3.5 MMOL/L (ref 3.5–5.1)
PROT SERPL-MCNC: 6 G/DL (ref 6.3–8.2)
RAD ONC ARIA COURSE FIRST TREATMENT DATE: NORMAL
RAD ONC ARIA COURSE ID: NORMAL
RAD ONC ARIA COURSE INTENT: NORMAL
RAD ONC ARIA COURSE LAST TREATMENT DATE: NORMAL
RAD ONC ARIA COURSE SESSION NUMBER: 10
RAD ONC ARIA COURSE START DATE: NORMAL
RAD ONC ARIA COURSE TREATMENT ELAPSED DAYS: 14
RAD ONC ARIA PLAN FRACTIONS TREATED TO DATE: 10
RAD ONC ARIA PLAN ID: NORMAL
RAD ONC ARIA PLAN PRESCRIBED DOSE PER FRACTION: 1.8 GY
RAD ONC ARIA PLAN PRIMARY REFERENCE POINT: NORMAL
RAD ONC ARIA PLAN TOTAL FRACTIONS PRESCRIBED: 28
RAD ONC ARIA PLAN TOTAL PRESCRIBED DOSE: 5040 CGY
RAD ONC ARIA REFERENCE POINT DOSAGE GIVEN TO DATE: 18 GY
RAD ONC ARIA REFERENCE POINT ID: NORMAL
RAD ONC ARIA REFERENCE POINT SESSION DOSAGE GIVEN: 1.8 GY
RBC # BLD AUTO: 3.47 M/UL (ref 4.05–5.2)
SODIUM SERPL-SCNC: 138 MMOL/L (ref 136–145)
WBC # BLD AUTO: 6.6 K/UL (ref 4.3–11.1)

## 2025-03-25 PROCEDURE — G8428 CUR MEDS NOT DOCUMENT: HCPCS | Performed by: INTERNAL MEDICINE

## 2025-03-25 PROCEDURE — 99214 OFFICE O/P EST MOD 30 MIN: CPT | Performed by: INTERNAL MEDICINE

## 2025-03-25 PROCEDURE — 80053 COMPREHEN METABOLIC PANEL: CPT

## 2025-03-25 PROCEDURE — 77336 RADIATION PHYSICS CONSULT: CPT

## 2025-03-25 PROCEDURE — 6360000002 HC RX W HCPCS: Performed by: INTERNAL MEDICINE

## 2025-03-25 PROCEDURE — 2500000003 HC RX 250 WO HCPCS: Performed by: INTERNAL MEDICINE

## 2025-03-25 PROCEDURE — 85025 COMPLETE CBC W/AUTO DIFF WBC: CPT

## 2025-03-25 PROCEDURE — 1123F ACP DISCUSS/DSCN MKR DOCD: CPT | Performed by: INTERNAL MEDICINE

## 2025-03-25 PROCEDURE — 1126F AMNT PAIN NOTED NONE PRSNT: CPT | Performed by: INTERNAL MEDICINE

## 2025-03-25 PROCEDURE — 1090F PRES/ABSN URINE INCON ASSESS: CPT | Performed by: INTERNAL MEDICINE

## 2025-03-25 PROCEDURE — G2211 COMPLEX E/M VISIT ADD ON: HCPCS | Performed by: INTERNAL MEDICINE

## 2025-03-25 PROCEDURE — 3077F SYST BP >= 140 MM HG: CPT | Performed by: INTERNAL MEDICINE

## 2025-03-25 PROCEDURE — 77386 HC NTSTY MODUL RAD TX DLVR CPLX: CPT

## 2025-03-25 PROCEDURE — 3078F DIAST BP <80 MM HG: CPT | Performed by: INTERNAL MEDICINE

## 2025-03-25 PROCEDURE — 1036F TOBACCO NON-USER: CPT | Performed by: INTERNAL MEDICINE

## 2025-03-25 PROCEDURE — 96413 CHEMO IV INFUSION 1 HR: CPT

## 2025-03-25 PROCEDURE — 2580000003 HC RX 258: Performed by: INTERNAL MEDICINE

## 2025-03-25 PROCEDURE — G8420 CALC BMI NORM PARAMETERS: HCPCS | Performed by: INTERNAL MEDICINE

## 2025-03-25 PROCEDURE — 1159F MED LIST DOCD IN RCRD: CPT | Performed by: INTERNAL MEDICINE

## 2025-03-25 PROCEDURE — 96375 TX/PRO/DX INJ NEW DRUG ADDON: CPT

## 2025-03-25 PROCEDURE — G8399 PT W/DXA RESULTS DOCUMENT: HCPCS | Performed by: INTERNAL MEDICINE

## 2025-03-25 RX ORDER — ONDANSETRON 2 MG/ML
8 INJECTION INTRAMUSCULAR; INTRAVENOUS ONCE
Status: COMPLETED | OUTPATIENT
Start: 2025-03-25 | End: 2025-03-25

## 2025-03-25 RX ORDER — SODIUM CHLORIDE 0.9 % (FLUSH) 0.9 %
5-40 SYRINGE (ML) INJECTION PRN
Status: DISCONTINUED | OUTPATIENT
Start: 2025-03-25 | End: 2025-03-26 | Stop reason: HOSPADM

## 2025-03-25 RX ORDER — SODIUM CHLORIDE 9 MG/ML
5-250 INJECTION, SOLUTION INTRAVENOUS PRN
Status: DISCONTINUED | OUTPATIENT
Start: 2025-03-25 | End: 2025-03-26 | Stop reason: HOSPADM

## 2025-03-25 RX ADMIN — SODIUM CHLORIDE, PRESERVATIVE FREE 20 ML: 5 INJECTION INTRAVENOUS at 10:10

## 2025-03-25 RX ADMIN — GEMCITABINE HYDROCHLORIDE 41 MG: 200 INJECTION, POWDER, LYOPHILIZED, FOR SOLUTION INTRAVENOUS at 12:43

## 2025-03-25 RX ADMIN — ONDANSETRON 8 MG: 2 INJECTION, SOLUTION INTRAMUSCULAR; INTRAVENOUS at 12:21

## 2025-03-25 RX ADMIN — SODIUM CHLORIDE, PRESERVATIVE FREE 10 ML: 5 INJECTION INTRAVENOUS at 12:19

## 2025-03-25 ASSESSMENT — PATIENT HEALTH QUESTIONNAIRE - PHQ9
1. LITTLE INTEREST OR PLEASURE IN DOING THINGS: NOT AT ALL
SUM OF ALL RESPONSES TO PHQ QUESTIONS 1-9: 0
2. FEELING DOWN, DEPRESSED OR HOPELESS: NOT AT ALL

## 2025-03-25 ASSESSMENT — PAIN SCALES - GENERAL: PAINLEVEL_OUTOF10: 0

## 2025-03-25 NOTE — PROGRESS NOTES
Patient to port lab for port access and lab draw. Port accessed per protocol; using 20g 0.75\" de la cruz needle without difficulty. Labs drawn from port and port flushed. Port remains accessed. Patient tolerated well. Discharged ambulatory.

## 2025-03-25 NOTE — PATIENT INSTRUCTIONS
Patient Information from Today's Visit    The members of your Oncology Medical [facility] are listed below:    Physician Provider: Dr. Dariel Gardiner, Medical Oncologist  Advanced Practice Clinician: N/A  Registered Nurse: Cary ARGUETA RN  Nurse Navigator: Saba HANDY RN  Medical Assistant: Rell VALLADARES MA  : Brandi ART  Supportive Care Services: Rick PINEDA LMSW    Diagnosis: bladder cancer    Follow Up Instructions: follow-up as scheduled     Labs reviewed     Treatment Summary has been discussed and given to patient: No Treatment Pathway            Current Labs:   Orders Only on 03/25/2025   Component Date Value Ref Range Status    Course ID 03/25/2025 C1   Final    Course Intent 03/25/2025 Curative   Final    Course Start Date 03/25/2025 2/27/2025 12:13 PM   Final    Session Number 03/25/2025 10   Final    Course First Treatment Date 03/25/2025 3/11/2025 12:05 PM   Final    Course Last Treatment Date 03/25/2025 3/25/2025 10:51 AM   Final    Course Elapsed Days 03/25/2025 14   Final    Reference Point ID 03/25/2025 Bladder   Final    Reference Point Dosage Given to Da* 03/25/2025 18  Gy Final    Reference Point Session Dosage Giv* 03/25/2025 1.8  Gy Final    Plan ID 03/25/2025 Bladder   Final    Plan Fractions Treated to Date 03/25/2025 10   Final    Plan Total Fractions Prescribed 03/25/2025 28   Final    Plan Prescribed Dose Per Fraction 03/25/2025 1.8  Gy Final    Plan Total Prescribed Dose 03/25/2025 5,040  cGy Final    Plan Primary Reference Point 03/25/2025 Bladder   Final   Hospital Outpatient Visit on 03/25/2025   Component Date Value Ref Range Status    WBC 03/25/2025 6.6  4.3 - 11.1 K/uL Final    RBC 03/25/2025 3.47 (L)  4.05 - 5.2 M/uL Final    Hemoglobin 03/25/2025 9.1 (L)  11.7 - 15.4 g/dL Final    Hematocrit 03/25/2025 30.7 (L)  35.8 - 46.3 % Final    MCV 03/25/2025 88.5  82.0 - 102.0 FL Final    MCH 03/25/2025 26.2  26.1 - 32.9 PG Final    MCHC 03/25/2025 29.6 (L)  31.4 - 35.0 g/dL Final    RDW

## 2025-03-25 NOTE — PROGRESS NOTES
Nurse Navigation outreach related to care coordination    Other Met with pt and her son Zev while in clinic to see Dr. Gardiner to discuss scheduled appts. Scheduling has been a challenge due to Zev's work schedule in law enforcement being [phone]. Pt may need assistance with rides later this week. Navigation to follow up with Zev's wife Livia ([phone]) who has been setting up rides through [employer].    Navigation Assessment:  The following were identified as potential barriers to care:   1. Transportation   2. Possible financial toxicity related to medical bills.     Interventions and Plan:  Family working with [employer] for transportation. Will provide financial assistance application to patient.    Goal of next outreach: Assess transportation needs  Time Spent: 30 minutes

## 2025-03-25 NOTE — PROGRESS NOTES
Arrived to the Infusion Center by wheelchair.  C1D15 Gemzar infusion completed. Patient tolerated well.   Any issues or concerns during appointment: none.  Patient aware of next infusion appointment on 3/28/2025 (date) at 1315 (time).  Patient aware of next lab and [facility] office visit on 3/28/2025 (date) at 1230 (time).  Patient instructed to call provider with temperature of 100.4 or greater or nausea/vomiting/ diarrhea or pain not controlled by medications  Discharged home wheelchair.

## 2025-03-25 NOTE — PROGRESS NOTES
[facility]  [address] [place] [address]  Dariel Gardiner MD    Patient Name Maria Alejandra Castillo   MRN [id number]   Date 03/25/25     Hematology/Oncology Diagnosis     1. Muscle invasive urothelial carcinoma  2. 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 lady with DM2, anemia, anxiety, COPD, HTN, HLD, smoking history, remote SCC of vulva who presents for follow-up.    - 12/23/24  Cystourethroscopy and large transurethral resection of bladder tumor, Dr. Lacey.  Pathology showed invasive urothelial carcinoma.  -1/5/2025 CT abdomen/pelvis at [facility].  Showed moderate to severe right hydroureteronephrosis with asymmetrically dilated right renal enhancement.  Dilation of right ureter to the level of right UVJ.  Indeterminate right adrenal nodule.  - 1/14/25  Cystoscopy, Dr Lipscomb.  A large amount of white fibrinous material throughout the upper portion of bladder observed, potentially consistent with fungus ball.  TURBT postponed.  Serna replaced.  - 1/15/24  Percutaneous nephrostomy tube placement.   - 1/30/24  TURBT, Dr Lipscomb.  Cystoscopy showed no signs of infection.  Large tumor involving over half of the bladder floor extending across the right trigone and up to the right sidewall observed.  Area of resection about 7 cm.  - 2/26/25  PETCT showed no metastatic disease.  - 3/11/25  Started CMT for disease control.  C1 biweekly gemzar (D1,4 while on XRT)  - 3/18/25  D8 gemzar  - 3/21/25  Missed D11 gemzar    Interval History  Presents for scheduled follow-up prior to D15 gemzar.  Missed D11 gemzar last week d/t transportation issues.  With her son Zev today.  Doing well thus far with radiation.  No acute complaints.  Concerns regarding demanding treatment schedule.      ROS   12 point review of system negative except as noted in HPI    Past Medical History:   Diagnosis Date    Anemia     oral Fe--per patient 1/14/25 recent blood

## 2025-03-26 ENCOUNTER — CLINICAL DOCUMENTATION (OUTPATIENT)
Dept: ONCOLOGY | Age: 80
End: 2025-03-26

## 2025-03-26 ENCOUNTER — HOSPITAL ENCOUNTER (OUTPATIENT)
Dept: RADIATION ONCOLOGY | Age: 80
Setting detail: RECURRING SERIES
Discharge: HOME | End: 2025-03-29
Payer: MEDICARE

## 2025-03-26 ENCOUNTER — CLINICAL DOCUMENTATION (OUTPATIENT)
Dept: CASE MANAGEMENT | Age: 80
End: 2025-03-26

## 2025-03-26 LAB
RAD ONC ARIA COURSE FIRST TREATMENT DATE: NORMAL
RAD ONC ARIA COURSE ID: NORMAL
RAD ONC ARIA COURSE INTENT: NORMAL
RAD ONC ARIA COURSE LAST TREATMENT DATE: NORMAL
RAD ONC ARIA COURSE SESSION NUMBER: 11
RAD ONC ARIA COURSE START DATE: NORMAL
RAD ONC ARIA COURSE TREATMENT ELAPSED DAYS: 15
RAD ONC ARIA PLAN FRACTIONS TREATED TO DATE: 11
RAD ONC ARIA PLAN ID: NORMAL
RAD ONC ARIA PLAN PRESCRIBED DOSE PER FRACTION: 1.8 GY
RAD ONC ARIA PLAN PRIMARY REFERENCE POINT: NORMAL
RAD ONC ARIA PLAN TOTAL FRACTIONS PRESCRIBED: 28
RAD ONC ARIA PLAN TOTAL PRESCRIBED DOSE: 5040 CGY
RAD ONC ARIA REFERENCE POINT DOSAGE GIVEN TO DATE: 19.8 GY
RAD ONC ARIA REFERENCE POINT ID: NORMAL
RAD ONC ARIA REFERENCE POINT SESSION DOSAGE GIVEN: 1.8 GY

## 2025-03-26 PROCEDURE — 77386 HC NTSTY MODUL RAD TX DLVR CPLX: CPT

## 2025-03-26 NOTE — PROGRESS NOTES
Transportation Confirmation   Ride(s) arranged for the appointment date(s) below. Patient is scheduled to arrive prior to their first appointment time and will receive a message to coordinate will call transportation back once their visit is complete.      Date(s): 3/27, 3/28, 3/31   Transportation Service: Roundtrip    Should appointments be added, altered, or canceled please notify  [facility] SW so adjustments can be made or additional transports arranged.   *Please see below for RoundTrip receipt confirmation*         Due to short notice, roundtrip scheduled for the following appts noted above. SW will verify with patient/[employer], if patient has transportation benefits available before booking future rides.

## 2025-03-26 NOTE — PROGRESS NOTES
Nurse Navigation outreach related to care coordination    Other Spoke with pts daughter-in-law Livia regarding transportation needs. Measy message sent with response received identifying needs. Message routed to LEOLA Cabrera.    Navigation Assessment:  The following were identified as potential barriers to care:   1. Transportation insecurity     Interventions and Plan:  Message routed to social work for assistance with rides.    Goal of next outreach: Status check  Time Spent: 15 minutes

## 2025-03-27 ENCOUNTER — TELEPHONE (OUTPATIENT)
Dept: RADIATION ONCOLOGY | Age: 80
End: 2025-03-27

## 2025-03-27 ENCOUNTER — CLINICAL DOCUMENTATION (OUTPATIENT)
Dept: ONCOLOGY | Age: 80
End: 2025-03-27

## 2025-03-27 NOTE — PROGRESS NOTES
Transportation Confirmation   Ride(s) arranged for the appointment date(s) below. Patient is scheduled to arrive prior to their first appointment time and will receive a message to coordinate will call transportation back once their visit is complete.      Date(s):3/2,3,4   Transportation Service:  Roundtrip   Should appointments be added, altered, or canceled please notify  [facility] SW so adjustments can be made or additional transports arranged.   *Please see below for RoundTrip receipt confirmation*       *

## 2025-03-27 NOTE — TELEPHONE ENCOUNTER
Physician provider: Dr. Lipscomb  Reason for today's call (Please detail here patients chief complaint): Patient's daughter-in-law states she spoke with Saba the nurse navigator about patient's transportation for today and no one showed up. Also have some additional questions for Saba the nurse navigator.    Last office visit:na  Patient Callback Number: [phone]  Was callback number verified?: Yes  Preferred pharmacy (If refill request): na  Veriified that patient confirmed no refills left at pharmacy? :No  Calls to office within the last 48 hours?:No    Warm Transfer to (For Red Words): na    Patient notified that their information will be routed to the [facility] clinical triage team for review. Patient is advised that they will receive a phone call from the triage department. If symptom related and symptoms worsen before receiving a call back, the patient has been advised to proceed to the nearest ED.

## 2025-03-27 NOTE — TELEPHONE ENCOUNTER
Called and spoke with Livia regarding transportation needs.  did not show up at pts home this morning. Will follow up with social work.

## 2025-03-28 ENCOUNTER — HOSPITAL ENCOUNTER (OUTPATIENT)
Dept: INFUSION THERAPY | Age: 80
Setting detail: INFUSION SERIES
Discharge: HOME | End: 2025-03-28
Payer: MEDICARE

## 2025-03-28 ENCOUNTER — HOSPITAL ENCOUNTER (OUTPATIENT)
Dept: LAB | Age: 80
Discharge: HOME | End: 2025-03-28
Payer: MEDICARE

## 2025-03-28 ENCOUNTER — HOSPITAL ENCOUNTER (OUTPATIENT)
Dept: RADIATION ONCOLOGY | Age: 80
Setting detail: RECURRING SERIES
Discharge: HOME | End: 2025-03-31
Payer: MEDICARE

## 2025-03-28 VITALS
OXYGEN SATURATION: 98 % | DIASTOLIC BLOOD PRESSURE: 66 MMHG | BODY MASS INDEX: 23.47 KG/M2 | WEIGHT: 116.2 LBS | RESPIRATION RATE: 16 BRPM | TEMPERATURE: 97.5 F | SYSTOLIC BLOOD PRESSURE: 159 MMHG | HEART RATE: 66 BPM

## 2025-03-28 DIAGNOSIS — C67.9 UROTHELIAL CARCINOMA OF BLADDER WITH INVASION OF MUSCLE: Primary | ICD-10-CM

## 2025-03-28 LAB
ALBUMIN SERPL-MCNC: 2.7 G/DL (ref 3.2–4.6)
ALBUMIN/GLOB SERPL: 0.6 (ref 1–1.9)
ALP SERPL-CCNC: 96 U/L (ref 35–104)
ALT SERPL-CCNC: 13 U/L (ref 8–45)
ANION GAP SERPL CALC-SCNC: 12 MMOL/L (ref 7–16)
AST SERPL-CCNC: 20 U/L (ref 15–37)
BASOPHILS # BLD: 0.02 K/UL (ref 0–0.2)
BASOPHILS NFR BLD: 0.4 % (ref 0–2)
BILIRUB SERPL-MCNC: <0.2 MG/DL (ref 0–1.2)
BUN SERPL-MCNC: 18 MG/DL (ref 8–23)
CALCIUM SERPL-MCNC: 9 MG/DL (ref 8.8–10.2)
CHLORIDE SERPL-SCNC: 97 MMOL/L (ref 98–107)
CO2 SERPL-SCNC: 29 MMOL/L (ref 20–29)
CREAT SERPL-MCNC: 0.88 MG/DL (ref 0.6–1.1)
DIFFERENTIAL METHOD BLD: ABNORMAL
EOSINOPHIL # BLD: 0.07 K/UL (ref 0–0.8)
EOSINOPHIL NFR BLD: 1.3 % (ref 0.5–7.8)
ERYTHROCYTE [DISTWIDTH] IN BLOOD BY AUTOMATED COUNT: 21.9 % (ref 11.9–14.6)
GLOBULIN SER CALC-MCNC: 4.2 G/DL (ref 2.3–3.5)
GLUCOSE SERPL-MCNC: 236 MG/DL (ref 70–99)
HCT VFR BLD AUTO: 31.9 % (ref 35.8–46.3)
HGB BLD-MCNC: 9.6 G/DL (ref 11.7–15.4)
IMM GRANULOCYTES # BLD AUTO: 0.14 K/UL (ref 0–0.5)
IMM GRANULOCYTES NFR BLD AUTO: 2.7 % (ref 0–5)
LYMPHOCYTES # BLD: 0.4 K/UL (ref 0.5–4.6)
LYMPHOCYTES NFR BLD: 7.6 % (ref 13–44)
MCH RBC QN AUTO: 26.2 PG (ref 26.1–32.9)
MCHC RBC AUTO-ENTMCNC: 30.1 G/DL (ref 31.4–35)
MCV RBC AUTO: 87.2 FL (ref 82–102)
MONOCYTES # BLD: 0.27 K/UL (ref 0.1–1.3)
MONOCYTES NFR BLD: 5.2 % (ref 4–12)
NEUTS SEG # BLD: 4.34 K/UL (ref 1.7–8.2)
NEUTS SEG NFR BLD: 82.8 % (ref 43–78)
NRBC # BLD: 0 K/UL (ref 0–0.2)
PLATELET # BLD AUTO: 449 K/UL (ref 150–450)
PMV BLD AUTO: 9.8 FL (ref 9.4–12.3)
POTASSIUM SERPL-SCNC: 3.4 MMOL/L (ref 3.5–5.1)
PROT SERPL-MCNC: 6.9 G/DL (ref 6.3–8.2)
RAD ONC ARIA COURSE FIRST TREATMENT DATE: NORMAL
RAD ONC ARIA COURSE ID: NORMAL
RAD ONC ARIA COURSE INTENT: NORMAL
RAD ONC ARIA COURSE LAST TREATMENT DATE: NORMAL
RAD ONC ARIA COURSE SESSION NUMBER: 12
RAD ONC ARIA COURSE START DATE: NORMAL
RAD ONC ARIA COURSE TREATMENT ELAPSED DAYS: 17
RAD ONC ARIA PLAN FRACTIONS TREATED TO DATE: 12
RAD ONC ARIA PLAN ID: NORMAL
RAD ONC ARIA PLAN PRESCRIBED DOSE PER FRACTION: 1.8 GY
RAD ONC ARIA PLAN PRIMARY REFERENCE POINT: NORMAL
RAD ONC ARIA PLAN TOTAL FRACTIONS PRESCRIBED: 28
RAD ONC ARIA PLAN TOTAL PRESCRIBED DOSE: 5040 CGY
RAD ONC ARIA REFERENCE POINT DOSAGE GIVEN TO DATE: 21.6 GY
RAD ONC ARIA REFERENCE POINT ID: NORMAL
RAD ONC ARIA REFERENCE POINT SESSION DOSAGE GIVEN: 1.8 GY
RBC # BLD AUTO: 3.66 M/UL (ref 4.05–5.2)
SODIUM SERPL-SCNC: 138 MMOL/L (ref 136–145)
WBC # BLD AUTO: 5.2 K/UL (ref 4.3–11.1)

## 2025-03-28 PROCEDURE — 2500000003 HC RX 250 WO HCPCS: Performed by: INTERNAL MEDICINE

## 2025-03-28 PROCEDURE — 85025 COMPLETE CBC W/AUTO DIFF WBC: CPT

## 2025-03-28 PROCEDURE — 2580000003 HC RX 258: Performed by: INTERNAL MEDICINE

## 2025-03-28 PROCEDURE — 96375 TX/PRO/DX INJ NEW DRUG ADDON: CPT

## 2025-03-28 PROCEDURE — 96413 CHEMO IV INFUSION 1 HR: CPT

## 2025-03-28 PROCEDURE — 80053 COMPREHEN METABOLIC PANEL: CPT

## 2025-03-28 PROCEDURE — 6360000002 HC RX W HCPCS: Performed by: INTERNAL MEDICINE

## 2025-03-28 PROCEDURE — 77386 HC NTSTY MODUL RAD TX DLVR CPLX: CPT

## 2025-03-28 RX ORDER — ONDANSETRON 2 MG/ML
8 INJECTION INTRAMUSCULAR; INTRAVENOUS ONCE
Status: COMPLETED | OUTPATIENT
Start: 2025-03-28 | End: 2025-03-28

## 2025-03-28 RX ORDER — MEPERIDINE HYDROCHLORIDE 25 MG/ML
12.5 INJECTION INTRAMUSCULAR; INTRAVENOUS; SUBCUTANEOUS PRN
Status: DISCONTINUED | OUTPATIENT
Start: 2025-03-28 | End: 2025-03-29 | Stop reason: HOSPADM

## 2025-03-28 RX ORDER — ALBUTEROL SULFATE 90 UG/1
4 INHALANT RESPIRATORY (INHALATION) PRN
Status: DISCONTINUED | OUTPATIENT
Start: 2025-03-28 | End: 2025-03-29 | Stop reason: HOSPADM

## 2025-03-28 RX ORDER — EPINEPHRINE 1 MG/ML
0.3 INJECTION, SOLUTION, CONCENTRATE INTRAVENOUS PRN
Status: DISCONTINUED | OUTPATIENT
Start: 2025-03-28 | End: 2025-03-29 | Stop reason: HOSPADM

## 2025-03-28 RX ORDER — HYDROCORTISONE SODIUM SUCCINATE 100 MG/2ML
100 INJECTION INTRAMUSCULAR; INTRAVENOUS
Status: DISCONTINUED | OUTPATIENT
Start: 2025-03-28 | End: 2025-03-29 | Stop reason: HOSPADM

## 2025-03-28 RX ORDER — DIPHENHYDRAMINE HYDROCHLORIDE 50 MG/ML
50 INJECTION, SOLUTION INTRAMUSCULAR; INTRAVENOUS
Status: DISCONTINUED | OUTPATIENT
Start: 2025-03-28 | End: 2025-03-29 | Stop reason: HOSPADM

## 2025-03-28 RX ORDER — SODIUM CHLORIDE 0.9 % (FLUSH) 0.9 %
5-40 SYRINGE (ML) INJECTION PRN
Status: DISCONTINUED | OUTPATIENT
Start: 2025-03-28 | End: 2025-03-29 | Stop reason: HOSPADM

## 2025-03-28 RX ORDER — ACETAMINOPHEN 325 MG/1
650 TABLET ORAL
Status: DISCONTINUED | OUTPATIENT
Start: 2025-03-28 | End: 2025-03-29 | Stop reason: HOSPADM

## 2025-03-28 RX ORDER — SODIUM CHLORIDE 9 MG/ML
5-250 INJECTION, SOLUTION INTRAVENOUS PRN
Status: DISCONTINUED | OUTPATIENT
Start: 2025-03-28 | End: 2025-03-29 | Stop reason: HOSPADM

## 2025-03-28 RX ORDER — ONDANSETRON 2 MG/ML
8 INJECTION INTRAMUSCULAR; INTRAVENOUS
Status: DISCONTINUED | OUTPATIENT
Start: 2025-03-28 | End: 2025-03-29 | Stop reason: HOSPADM

## 2025-03-28 RX ADMIN — GEMCITABINE HYDROCHLORIDE 41 MG: 200 INJECTION, POWDER, LYOPHILIZED, FOR SOLUTION INTRAVENOUS at 12:59

## 2025-03-28 RX ADMIN — SODIUM CHLORIDE, PRESERVATIVE FREE 20 ML: 5 INJECTION INTRAVENOUS at 11:38

## 2025-03-28 RX ADMIN — ONDANSETRON 8 MG: 2 INJECTION, SOLUTION INTRAMUSCULAR; INTRAVENOUS at 12:34

## 2025-03-28 RX ADMIN — SODIUM CHLORIDE, PRESERVATIVE FREE 10 ML: 5 INJECTION INTRAVENOUS at 12:31

## 2025-03-28 RX ADMIN — SODIUM CHLORIDE 100 ML/HR: 0.9 INJECTION, SOLUTION INTRAVENOUS at 12:34

## 2025-03-28 NOTE — PROGRESS NOTES
Patient arrived to infusion. Gemzar completed. Patient tolerated well. Discharged via w/c. Patient aware of next infusion appt on 4/1.

## 2025-03-28 NOTE — ON TREATMENT VISIT
[facility] RADIATION ONCOLOGY ON TREATMENT VISIT    Patient: Maria Alejandra Castillo MRN: [id number]  SSN: [id number]    YOB: 1945  Age: 79 y.o.  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 female who is currently receiving trimodality therapy for MIBC.    Current RT dose: 23.4/64.8 Gy in 13/36 fractions.     Concurrent systemic therapy: Gemzar    Subjective:  Week 1: No complaints apart from some nausea after her chemotherapy.  Week 2: Had a fall on Thursday evening but did not hit her head. She says she fell on her left side and hit her dresser on the lower left flank. This area is sore but has no other complaints.   Week 3: No complaints.    Objective:  There were no vitals filed for this visit.    Pain 0/10    General: Alert and conversant, in NAD  Skin: no issues    Assessment:  Patient is tolerating radiation therapy well with no XRT issues.    Plan:  -Zofran for nausea.  -Continue RT as planned.  -Treatment images reviewed.  -The patient has a documented plan of care to address pain.  Pain is not present.      Rene Kennedy MD  03/28/25

## 2025-03-31 ENCOUNTER — HOSPITAL ENCOUNTER (OUTPATIENT)
Dept: RADIATION ONCOLOGY | Age: 80
Setting detail: RECURRING SERIES
Discharge: HOME | End: 2025-04-03
Payer: MEDICARE

## 2025-03-31 ENCOUNTER — TELEPHONE (OUTPATIENT)
Dept: ONCOLOGY | Age: 80
End: 2025-03-31

## 2025-03-31 LAB
RAD ONC ARIA COURSE FIRST TREATMENT DATE: NORMAL
RAD ONC ARIA COURSE ID: NORMAL
RAD ONC ARIA COURSE INTENT: NORMAL
RAD ONC ARIA COURSE LAST TREATMENT DATE: NORMAL
RAD ONC ARIA COURSE SESSION NUMBER: 13
RAD ONC ARIA COURSE START DATE: NORMAL
RAD ONC ARIA COURSE TREATMENT ELAPSED DAYS: 20
RAD ONC ARIA PLAN FRACTIONS TREATED TO DATE: 13
RAD ONC ARIA PLAN ID: NORMAL
RAD ONC ARIA PLAN PRESCRIBED DOSE PER FRACTION: 1.8 GY
RAD ONC ARIA PLAN PRIMARY REFERENCE POINT: NORMAL
RAD ONC ARIA PLAN TOTAL FRACTIONS PRESCRIBED: 28
RAD ONC ARIA PLAN TOTAL PRESCRIBED DOSE: 5040 CGY
RAD ONC ARIA REFERENCE POINT DOSAGE GIVEN TO DATE: 23.4 GY
RAD ONC ARIA REFERENCE POINT ID: NORMAL
RAD ONC ARIA REFERENCE POINT SESSION DOSAGE GIVEN: 1.8 GY

## 2025-03-31 PROCEDURE — 77338 DESIGN MLC DEVICE FOR IMRT: CPT

## 2025-03-31 PROCEDURE — 77386 HC NTSTY MODUL RAD TX DLVR CPLX: CPT

## 2025-03-31 PROCEDURE — 77300 RADIATION THERAPY DOSE PLAN: CPT

## 2025-03-31 RX ORDER — ONDANSETRON 4 MG/1
4 TABLET, ORALLY DISINTEGRATING ORAL 3 TIMES DAILY PRN
Qty: 21 TABLET | Refills: 2 | Status: SHIPPED | OUTPATIENT
Start: 2025-03-31

## 2025-04-01 ENCOUNTER — HOSPITAL ENCOUNTER (OUTPATIENT)
Dept: LAB | Age: 80
Discharge: HOME | End: 2025-04-01
Payer: MEDICARE

## 2025-04-01 ENCOUNTER — APPOINTMENT (OUTPATIENT)
Dept: RADIATION ONCOLOGY | Age: 80
End: 2025-04-01
Payer: MEDICARE

## 2025-04-01 ENCOUNTER — HOSPITAL ENCOUNTER (OUTPATIENT)
Dept: RADIATION ONCOLOGY | Age: 80
Setting detail: RECURRING SERIES
Discharge: HOME | End: 2025-04-04
Payer: MEDICARE

## 2025-04-01 ENCOUNTER — HOSPITAL ENCOUNTER (OUTPATIENT)
Dept: INFUSION THERAPY | Age: 80
Setting detail: INFUSION SERIES
Discharge: HOME | End: 2025-04-01
Payer: MEDICARE

## 2025-04-01 ENCOUNTER — APPOINTMENT (OUTPATIENT)
Dept: INFUSION THERAPY | Age: 80
End: 2025-04-01
Payer: MEDICARE

## 2025-04-01 VITALS
TEMPERATURE: 97.5 F | SYSTOLIC BLOOD PRESSURE: 162 MMHG | BODY MASS INDEX: 23.39 KG/M2 | RESPIRATION RATE: 16 BRPM | HEART RATE: 70 BPM | WEIGHT: 115.8 LBS | DIASTOLIC BLOOD PRESSURE: 63 MMHG | OXYGEN SATURATION: 94 %

## 2025-04-01 DIAGNOSIS — C67.9 UROTHELIAL CARCINOMA OF BLADDER WITH INVASION OF MUSCLE: ICD-10-CM

## 2025-04-01 DIAGNOSIS — C67.9 UROTHELIAL CARCINOMA OF BLADDER WITH INVASION OF MUSCLE: Primary | ICD-10-CM

## 2025-04-01 LAB
ALBUMIN SERPL-MCNC: 2.7 G/DL (ref 3.2–4.6)
ALBUMIN/GLOB SERPL: 0.7 (ref 1–1.9)
ALP SERPL-CCNC: 112 U/L (ref 35–104)
ALT SERPL-CCNC: 17 U/L (ref 8–45)
ANION GAP SERPL CALC-SCNC: 11 MMOL/L (ref 7–16)
AST SERPL-CCNC: 20 U/L (ref 15–37)
BASOPHILS # BLD: 0.03 K/UL (ref 0–0.2)
BASOPHILS NFR BLD: 0.5 % (ref 0–2)
BILIRUB SERPL-MCNC: <0.2 MG/DL (ref 0–1.2)
BUN SERPL-MCNC: 14 MG/DL (ref 8–23)
CALCIUM SERPL-MCNC: 9.5 MG/DL (ref 8.8–10.2)
CHLORIDE SERPL-SCNC: 97 MMOL/L (ref 98–107)
CO2 SERPL-SCNC: 29 MMOL/L (ref 20–29)
CREAT SERPL-MCNC: 0.96 MG/DL (ref 0.6–1.1)
DIFFERENTIAL METHOD BLD: ABNORMAL
EOSINOPHIL # BLD: 0.1 K/UL (ref 0–0.8)
EOSINOPHIL NFR BLD: 1.8 % (ref 0.5–7.8)
ERYTHROCYTE [DISTWIDTH] IN BLOOD BY AUTOMATED COUNT: 22.4 % (ref 11.9–14.6)
GLOBULIN SER CALC-MCNC: 4.1 G/DL (ref 2.3–3.5)
GLUCOSE SERPL-MCNC: 306 MG/DL (ref 70–99)
HCT VFR BLD AUTO: 31.3 % (ref 35.8–46.3)
HGB BLD-MCNC: 9.6 G/DL (ref 11.7–15.4)
IMM GRANULOCYTES # BLD AUTO: 0.36 K/UL (ref 0–0.5)
IMM GRANULOCYTES NFR BLD AUTO: 6.4 % (ref 0–5)
LYMPHOCYTES # BLD: 0.4 K/UL (ref 0.5–4.6)
LYMPHOCYTES NFR BLD: 7.1 % (ref 13–44)
MCH RBC QN AUTO: 26.8 PG (ref 26.1–32.9)
MCHC RBC AUTO-ENTMCNC: 30.7 G/DL (ref 31.4–35)
MCV RBC AUTO: 87.4 FL (ref 82–102)
MONOCYTES # BLD: 0.4 K/UL (ref 0.1–1.3)
MONOCYTES NFR BLD: 7.1 % (ref 4–12)
NEUTS SEG # BLD: 4.31 K/UL (ref 1.7–8.2)
NEUTS SEG NFR BLD: 77.1 % (ref 43–78)
NRBC # BLD: 0.03 K/UL (ref 0–0.2)
PLATELET # BLD AUTO: 497 K/UL (ref 150–450)
PLATELET COMMENT: ABNORMAL
PMV BLD AUTO: 9.7 FL (ref 9.4–12.3)
POTASSIUM SERPL-SCNC: 3.4 MMOL/L (ref 3.5–5.1)
PROT SERPL-MCNC: 6.8 G/DL (ref 6.3–8.2)
RAD ONC ARIA COURSE FIRST TREATMENT DATE: NORMAL
RAD ONC ARIA COURSE ID: NORMAL
RAD ONC ARIA COURSE INTENT: NORMAL
RAD ONC ARIA COURSE LAST TREATMENT DATE: NORMAL
RAD ONC ARIA COURSE SESSION NUMBER: 14
RAD ONC ARIA COURSE START DATE: NORMAL
RAD ONC ARIA COURSE TREATMENT ELAPSED DAYS: 21
RAD ONC ARIA PLAN FRACTIONS TREATED TO DATE: 14
RAD ONC ARIA PLAN ID: NORMAL
RAD ONC ARIA PLAN PRESCRIBED DOSE PER FRACTION: 1.8 GY
RAD ONC ARIA PLAN PRIMARY REFERENCE POINT: NORMAL
RAD ONC ARIA PLAN TOTAL FRACTIONS PRESCRIBED: 28
RAD ONC ARIA PLAN TOTAL PRESCRIBED DOSE: 5040 CGY
RAD ONC ARIA REFERENCE POINT DOSAGE GIVEN TO DATE: 25.2 GY
RAD ONC ARIA REFERENCE POINT ID: NORMAL
RAD ONC ARIA REFERENCE POINT SESSION DOSAGE GIVEN: 1.8 GY
RBC # BLD AUTO: 3.58 M/UL (ref 4.05–5.2)
RBC MORPH BLD: ABNORMAL
RBC MORPH BLD: ABNORMAL
SODIUM SERPL-SCNC: 137 MMOL/L (ref 136–145)
WBC # BLD AUTO: 5.6 K/UL (ref 4.3–11.1)
WBC MORPH BLD: ABNORMAL

## 2025-04-01 PROCEDURE — 2580000003 HC RX 258: Performed by: INTERNAL MEDICINE

## 2025-04-01 PROCEDURE — 96375 TX/PRO/DX INJ NEW DRUG ADDON: CPT

## 2025-04-01 PROCEDURE — 96413 CHEMO IV INFUSION 1 HR: CPT

## 2025-04-01 PROCEDURE — 6360000002 HC RX W HCPCS: Performed by: INTERNAL MEDICINE

## 2025-04-01 PROCEDURE — 2500000003 HC RX 250 WO HCPCS: Performed by: INTERNAL MEDICINE

## 2025-04-01 PROCEDURE — 77386 HC NTSTY MODUL RAD TX DLVR CPLX: CPT

## 2025-04-01 PROCEDURE — 80053 COMPREHEN METABOLIC PANEL: CPT

## 2025-04-01 PROCEDURE — 85025 COMPLETE CBC W/AUTO DIFF WBC: CPT

## 2025-04-01 RX ORDER — EPINEPHRINE 1 MG/ML
0.3 INJECTION, SOLUTION, CONCENTRATE INTRAVENOUS PRN
Status: DISCONTINUED | OUTPATIENT
Start: 2025-04-01 | End: 2025-04-02 | Stop reason: HOSPADM

## 2025-04-01 RX ORDER — ONDANSETRON 2 MG/ML
8 INJECTION INTRAMUSCULAR; INTRAVENOUS
Status: DISCONTINUED | OUTPATIENT
Start: 2025-04-01 | End: 2025-04-02 | Stop reason: HOSPADM

## 2025-04-01 RX ORDER — SODIUM CHLORIDE 0.9 % (FLUSH) 0.9 %
5-40 SYRINGE (ML) INJECTION PRN
Status: DISCONTINUED | OUTPATIENT
Start: 2025-04-01 | End: 2025-04-02 | Stop reason: HOSPADM

## 2025-04-01 RX ORDER — HYDROCORTISONE SODIUM SUCCINATE 100 MG/2ML
100 INJECTION INTRAMUSCULAR; INTRAVENOUS
Status: DISCONTINUED | OUTPATIENT
Start: 2025-04-01 | End: 2025-04-02 | Stop reason: HOSPADM

## 2025-04-01 RX ORDER — MEPERIDINE HYDROCHLORIDE 25 MG/ML
12.5 INJECTION INTRAMUSCULAR; INTRAVENOUS; SUBCUTANEOUS PRN
Status: DISCONTINUED | OUTPATIENT
Start: 2025-04-01 | End: 2025-04-02 | Stop reason: HOSPADM

## 2025-04-01 RX ORDER — ALBUTEROL SULFATE 90 UG/1
4 INHALANT RESPIRATORY (INHALATION) PRN
Status: DISCONTINUED | OUTPATIENT
Start: 2025-04-01 | End: 2025-04-02 | Stop reason: HOSPADM

## 2025-04-01 RX ORDER — SODIUM CHLORIDE 9 MG/ML
5-250 INJECTION, SOLUTION INTRAVENOUS PRN
Status: DISCONTINUED | OUTPATIENT
Start: 2025-04-01 | End: 2025-04-02 | Stop reason: HOSPADM

## 2025-04-01 RX ORDER — ONDANSETRON 2 MG/ML
8 INJECTION INTRAMUSCULAR; INTRAVENOUS ONCE
Status: COMPLETED | OUTPATIENT
Start: 2025-04-01 | End: 2025-04-01

## 2025-04-01 RX ORDER — ACETAMINOPHEN 325 MG/1
650 TABLET ORAL
Status: DISCONTINUED | OUTPATIENT
Start: 2025-04-01 | End: 2025-04-02 | Stop reason: HOSPADM

## 2025-04-01 RX ORDER — DIPHENHYDRAMINE HYDROCHLORIDE 50 MG/ML
50 INJECTION, SOLUTION INTRAMUSCULAR; INTRAVENOUS
Status: DISCONTINUED | OUTPATIENT
Start: 2025-04-01 | End: 2025-04-02 | Stop reason: HOSPADM

## 2025-04-01 RX ADMIN — ONDANSETRON 8 MG: 2 INJECTION, SOLUTION INTRAMUSCULAR; INTRAVENOUS at 13:00

## 2025-04-01 RX ADMIN — SODIUM CHLORIDE, PRESERVATIVE FREE 10 ML: 5 INJECTION INTRAVENOUS at 14:07

## 2025-04-01 RX ADMIN — GEMCITABINE HYDROCHLORIDE 41 MG: 200 INJECTION, POWDER, LYOPHILIZED, FOR SOLUTION INTRAVENOUS at 13:34

## 2025-04-01 RX ADMIN — SODIUM CHLORIDE, PRESERVATIVE FREE 10 ML: 5 INJECTION INTRAVENOUS at 10:52

## 2025-04-01 NOTE — PROGRESS NOTES
Arrived to the Infusion Center.  Gemzar completed. Patient tolerated without problems.   Any issues or concerns during appointment: extended wait time for manual Diff. Patient unable to stay long enough to have Venofer today. Venofer appointment scheduled for Thursday, patient aware and given printed calendar.  Patient aware of next infusion appointment on 4/3/25 (date) at 1130   Patient instructed to call provider with temperature of 100.4 or greater or nausea/vomiting/ diarrhea or pain not controlled by medications  Discharged via WC with Transportation Service.

## 2025-04-01 NOTE — PROGRESS NOTES
Patient to port lab for port access and lab draw. Port accessed using 20g 0.75\" de la cruz needle without difficulty. Labs drawn from port and port flushed. Port remains accessed. Patient tolerated well. Discharged via wheelchair.

## 2025-04-02 ENCOUNTER — HOSPITAL ENCOUNTER (OUTPATIENT)
Dept: RADIATION ONCOLOGY | Age: 80
Setting detail: RECURRING SERIES
Discharge: HOME | End: 2025-04-05
Payer: MEDICARE

## 2025-04-02 LAB
RAD ONC ARIA COURSE FIRST TREATMENT DATE: NORMAL
RAD ONC ARIA COURSE ID: NORMAL
RAD ONC ARIA COURSE INTENT: NORMAL
RAD ONC ARIA COURSE LAST TREATMENT DATE: NORMAL
RAD ONC ARIA COURSE SESSION NUMBER: 15
RAD ONC ARIA COURSE START DATE: NORMAL
RAD ONC ARIA COURSE TREATMENT ELAPSED DAYS: 22
RAD ONC ARIA PLAN FRACTIONS TREATED TO DATE: 15
RAD ONC ARIA PLAN ID: NORMAL
RAD ONC ARIA PLAN PRESCRIBED DOSE PER FRACTION: 1.8 GY
RAD ONC ARIA PLAN PRIMARY REFERENCE POINT: NORMAL
RAD ONC ARIA PLAN TOTAL FRACTIONS PRESCRIBED: 28
RAD ONC ARIA PLAN TOTAL PRESCRIBED DOSE: 5040 CGY
RAD ONC ARIA REFERENCE POINT DOSAGE GIVEN TO DATE: 27 GY
RAD ONC ARIA REFERENCE POINT ID: NORMAL
RAD ONC ARIA REFERENCE POINT SESSION DOSAGE GIVEN: 1.8 GY

## 2025-04-02 PROCEDURE — 77386 HC NTSTY MODUL RAD TX DLVR CPLX: CPT

## 2025-04-03 ENCOUNTER — HOSPITAL ENCOUNTER (OUTPATIENT)
Dept: RADIATION ONCOLOGY | Age: 80
Setting detail: RECURRING SERIES
Discharge: HOME | End: 2025-04-06
Payer: MEDICARE

## 2025-04-03 ENCOUNTER — HOSPITAL ENCOUNTER (OUTPATIENT)
Dept: INFUSION THERAPY | Age: 80
Setting detail: INFUSION SERIES
Discharge: HOME | End: 2025-04-03
Payer: MEDICARE

## 2025-04-03 VITALS
DIASTOLIC BLOOD PRESSURE: 80 MMHG | RESPIRATION RATE: 16 BRPM | SYSTOLIC BLOOD PRESSURE: 164 MMHG | HEART RATE: 71 BPM | OXYGEN SATURATION: 95 % | TEMPERATURE: 97.7 F

## 2025-04-03 DIAGNOSIS — D50.0 IRON DEFICIENCY ANEMIA DUE TO CHRONIC BLOOD LOSS: Primary | ICD-10-CM

## 2025-04-03 LAB
RAD ONC ARIA COURSE FIRST TREATMENT DATE: NORMAL
RAD ONC ARIA COURSE ID: NORMAL
RAD ONC ARIA COURSE INTENT: NORMAL
RAD ONC ARIA COURSE LAST TREATMENT DATE: NORMAL
RAD ONC ARIA COURSE SESSION NUMBER: 16
RAD ONC ARIA COURSE START DATE: NORMAL
RAD ONC ARIA COURSE TREATMENT ELAPSED DAYS: 23
RAD ONC ARIA PLAN FRACTIONS TREATED TO DATE: 16
RAD ONC ARIA PLAN ID: NORMAL
RAD ONC ARIA PLAN PRESCRIBED DOSE PER FRACTION: 1.8 GY
RAD ONC ARIA PLAN PRIMARY REFERENCE POINT: NORMAL
RAD ONC ARIA PLAN TOTAL FRACTIONS PRESCRIBED: 28
RAD ONC ARIA PLAN TOTAL PRESCRIBED DOSE: 5040 CGY
RAD ONC ARIA REFERENCE POINT DOSAGE GIVEN TO DATE: 28.8 GY
RAD ONC ARIA REFERENCE POINT ID: NORMAL
RAD ONC ARIA REFERENCE POINT SESSION DOSAGE GIVEN: 1.8 GY

## 2025-04-03 PROCEDURE — 77386 HC NTSTY MODUL RAD TX DLVR CPLX: CPT

## 2025-04-03 PROCEDURE — 96365 THER/PROPH/DIAG IV INF INIT: CPT

## 2025-04-03 PROCEDURE — 6360000002 HC RX W HCPCS: Performed by: INTERNAL MEDICINE

## 2025-04-03 PROCEDURE — 96366 THER/PROPH/DIAG IV INF ADDON: CPT

## 2025-04-03 PROCEDURE — 2500000003 HC RX 250 WO HCPCS: Performed by: INTERNAL MEDICINE

## 2025-04-03 PROCEDURE — 2580000003 HC RX 258: Performed by: INTERNAL MEDICINE

## 2025-04-03 PROCEDURE — 77336 RADIATION PHYSICS CONSULT: CPT

## 2025-04-03 RX ORDER — DIPHENHYDRAMINE HYDROCHLORIDE 50 MG/ML
50 INJECTION, SOLUTION INTRAMUSCULAR; INTRAVENOUS
OUTPATIENT
Start: 2025-04-03

## 2025-04-03 RX ORDER — SODIUM CHLORIDE 9 MG/ML
5-250 INJECTION, SOLUTION INTRAVENOUS PRN
Status: DISCONTINUED | OUTPATIENT
Start: 2025-04-03 | End: 2025-04-04 | Stop reason: HOSPADM

## 2025-04-03 RX ORDER — DIPHENHYDRAMINE HYDROCHLORIDE 50 MG/ML
50 INJECTION, SOLUTION INTRAMUSCULAR; INTRAVENOUS
Status: DISCONTINUED | OUTPATIENT
Start: 2025-04-03 | End: 2025-04-04 | Stop reason: HOSPADM

## 2025-04-03 RX ORDER — SODIUM CHLORIDE 0.9 % (FLUSH) 0.9 %
5-40 SYRINGE (ML) INJECTION PRN
OUTPATIENT
Start: 2025-04-03

## 2025-04-03 RX ORDER — ACETAMINOPHEN 325 MG/1
650 TABLET ORAL
Status: DISCONTINUED | OUTPATIENT
Start: 2025-04-03 | End: 2025-04-04 | Stop reason: HOSPADM

## 2025-04-03 RX ORDER — ONDANSETRON 2 MG/ML
8 INJECTION INTRAMUSCULAR; INTRAVENOUS
OUTPATIENT
Start: 2025-04-03

## 2025-04-03 RX ORDER — EPINEPHRINE 1 MG/ML
0.3 INJECTION, SOLUTION, CONCENTRATE INTRAVENOUS PRN
Status: DISCONTINUED | OUTPATIENT
Start: 2025-04-03 | End: 2025-04-04 | Stop reason: HOSPADM

## 2025-04-03 RX ORDER — HEPARIN 100 UNIT/ML
500 SYRINGE INTRAVENOUS PRN
OUTPATIENT
Start: 2025-04-03

## 2025-04-03 RX ORDER — SODIUM CHLORIDE 9 MG/ML
INJECTION, SOLUTION INTRAVENOUS CONTINUOUS
OUTPATIENT
Start: 2025-04-03

## 2025-04-03 RX ORDER — ALBUTEROL SULFATE 90 UG/1
4 INHALANT RESPIRATORY (INHALATION) PRN
Status: DISCONTINUED | OUTPATIENT
Start: 2025-04-03 | End: 2025-04-04 | Stop reason: HOSPADM

## 2025-04-03 RX ORDER — SODIUM CHLORIDE 9 MG/ML
5-250 INJECTION, SOLUTION INTRAVENOUS PRN
OUTPATIENT
Start: 2025-04-03

## 2025-04-03 RX ORDER — HYDROCORTISONE SODIUM SUCCINATE 100 MG/2ML
100 INJECTION INTRAMUSCULAR; INTRAVENOUS
Status: DISCONTINUED | OUTPATIENT
Start: 2025-04-03 | End: 2025-04-04 | Stop reason: HOSPADM

## 2025-04-03 RX ORDER — HYDROCORTISONE SODIUM SUCCINATE 100 MG/2ML
100 INJECTION INTRAMUSCULAR; INTRAVENOUS
OUTPATIENT
Start: 2025-04-03

## 2025-04-03 RX ORDER — ONDANSETRON 2 MG/ML
8 INJECTION INTRAMUSCULAR; INTRAVENOUS
Status: DISCONTINUED | OUTPATIENT
Start: 2025-04-03 | End: 2025-04-04 | Stop reason: HOSPADM

## 2025-04-03 RX ORDER — EPINEPHRINE 1 MG/ML
0.3 INJECTION, SOLUTION, CONCENTRATE INTRAVENOUS PRN
OUTPATIENT
Start: 2025-04-03

## 2025-04-03 RX ORDER — ACETAMINOPHEN 325 MG/1
650 TABLET ORAL
OUTPATIENT
Start: 2025-04-03

## 2025-04-03 RX ORDER — SODIUM CHLORIDE 0.9 % (FLUSH) 0.9 %
5-40 SYRINGE (ML) INJECTION PRN
Status: DISCONTINUED | OUTPATIENT
Start: 2025-04-03 | End: 2025-04-04 | Stop reason: HOSPADM

## 2025-04-03 RX ORDER — ALBUTEROL SULFATE 90 UG/1
4 INHALANT RESPIRATORY (INHALATION) PRN
OUTPATIENT
Start: 2025-04-03

## 2025-04-03 RX ADMIN — IRON SUCROSE 400 MG: 20 INJECTION, SOLUTION INTRAVENOUS at 11:42

## 2025-04-03 RX ADMIN — SODIUM CHLORIDE, PRESERVATIVE FREE 10 ML: 5 INJECTION INTRAVENOUS at 11:21

## 2025-04-03 NOTE — PROGRESS NOTES
Arrived to the Infusion Center.  Venofer completed.  Patient tolerated well.   Any issues or concerns during appointment: none.   Patient aware of next infusion appointment on 4/4/2025 (date) at 1:15 PM (time).  Discharged via wheelchair.

## 2025-04-04 ENCOUNTER — HOSPITAL ENCOUNTER (OUTPATIENT)
Dept: RADIATION ONCOLOGY | Age: 80
Setting detail: RECURRING SERIES
Discharge: HOME | End: 2025-04-07
Payer: MEDICARE

## 2025-04-04 ENCOUNTER — HOSPITAL ENCOUNTER (OUTPATIENT)
Dept: LAB | Age: 80
Discharge: HOME | End: 2025-04-04
Payer: MEDICARE

## 2025-04-04 ENCOUNTER — HOSPITAL ENCOUNTER (OUTPATIENT)
Dept: INFUSION THERAPY | Age: 80
Setting detail: INFUSION SERIES
Discharge: HOME | End: 2025-04-04
Payer: MEDICARE

## 2025-04-04 VITALS
HEART RATE: 73 BPM | OXYGEN SATURATION: 97 % | RESPIRATION RATE: 16 BRPM | BODY MASS INDEX: 23.39 KG/M2 | TEMPERATURE: 97.5 F | DIASTOLIC BLOOD PRESSURE: 74 MMHG | WEIGHT: 115.8 LBS | SYSTOLIC BLOOD PRESSURE: 159 MMHG

## 2025-04-04 DIAGNOSIS — C67.9 UROTHELIAL CARCINOMA OF BLADDER WITH INVASION OF MUSCLE: Primary | ICD-10-CM

## 2025-04-04 LAB
BASOPHILS # BLD: 0.06 K/UL (ref 0–0.2)
BASOPHILS NFR BLD: 1 % (ref 0–2)
DIFFERENTIAL METHOD BLD: ABNORMAL
EOSINOPHIL # BLD: 0.07 K/UL (ref 0–0.8)
EOSINOPHIL NFR BLD: 1.1 % (ref 0.5–7.8)
ERYTHROCYTE [DISTWIDTH] IN BLOOD BY AUTOMATED COUNT: 22.7 % (ref 11.9–14.6)
HCT VFR BLD AUTO: 30.9 % (ref 35.8–46.3)
HGB BLD-MCNC: 9.4 G/DL (ref 11.7–15.4)
IMM GRANULOCYTES # BLD AUTO: 0.23 K/UL (ref 0–0.5)
IMM GRANULOCYTES NFR BLD AUTO: 3.8 % (ref 0–5)
LYMPHOCYTES # BLD: 0.33 K/UL (ref 0.5–4.6)
LYMPHOCYTES NFR BLD: 5.4 % (ref 13–44)
MCH RBC QN AUTO: 26.9 PG (ref 26.1–32.9)
MCHC RBC AUTO-ENTMCNC: 30.4 G/DL (ref 31.4–35)
MCV RBC AUTO: 88.5 FL (ref 82–102)
MONOCYTES # BLD: 0.42 K/UL (ref 0.1–1.3)
MONOCYTES NFR BLD: 6.9 % (ref 4–12)
NEUTS SEG # BLD: 5 K/UL (ref 1.7–8.2)
NEUTS SEG NFR BLD: 81.8 % (ref 43–78)
NRBC # BLD: 0.03 K/UL (ref 0–0.2)
PLATELET # BLD AUTO: 406 K/UL (ref 150–450)
PMV BLD AUTO: 10 FL (ref 9.4–12.3)
RAD ONC ARIA COURSE FIRST TREATMENT DATE: NORMAL
RAD ONC ARIA COURSE ID: NORMAL
RAD ONC ARIA COURSE INTENT: NORMAL
RAD ONC ARIA COURSE LAST TREATMENT DATE: NORMAL
RAD ONC ARIA COURSE SESSION NUMBER: 17
RAD ONC ARIA COURSE START DATE: NORMAL
RAD ONC ARIA COURSE TREATMENT ELAPSED DAYS: 24
RAD ONC ARIA PLAN FRACTIONS TREATED TO DATE: 17
RAD ONC ARIA PLAN ID: NORMAL
RAD ONC ARIA PLAN PRESCRIBED DOSE PER FRACTION: 1.8 GY
RAD ONC ARIA PLAN PRIMARY REFERENCE POINT: NORMAL
RAD ONC ARIA PLAN TOTAL FRACTIONS PRESCRIBED: 28
RAD ONC ARIA PLAN TOTAL PRESCRIBED DOSE: 5040 CGY
RAD ONC ARIA REFERENCE POINT DOSAGE GIVEN TO DATE: 30.6 GY
RAD ONC ARIA REFERENCE POINT ID: NORMAL
RAD ONC ARIA REFERENCE POINT SESSION DOSAGE GIVEN: 1.8 GY
RBC # BLD AUTO: 3.49 M/UL (ref 4.05–5.2)
WBC # BLD AUTO: 6.1 K/UL (ref 4.3–11.1)

## 2025-04-04 PROCEDURE — 2500000003 HC RX 250 WO HCPCS: Performed by: INTERNAL MEDICINE

## 2025-04-04 PROCEDURE — 85025 COMPLETE CBC W/AUTO DIFF WBC: CPT

## 2025-04-04 PROCEDURE — 96413 CHEMO IV INFUSION 1 HR: CPT

## 2025-04-04 PROCEDURE — 2580000003 HC RX 258: Performed by: INTERNAL MEDICINE

## 2025-04-04 PROCEDURE — 96375 TX/PRO/DX INJ NEW DRUG ADDON: CPT

## 2025-04-04 PROCEDURE — 77386 HC NTSTY MODUL RAD TX DLVR CPLX: CPT

## 2025-04-04 PROCEDURE — 6360000002 HC RX W HCPCS: Performed by: INTERNAL MEDICINE

## 2025-04-04 RX ORDER — SODIUM CHLORIDE 0.9 % (FLUSH) 0.9 %
5-40 SYRINGE (ML) INJECTION PRN
Status: DISCONTINUED | OUTPATIENT
Start: 2025-04-04 | End: 2025-04-05 | Stop reason: HOSPADM

## 2025-04-04 RX ORDER — SODIUM CHLORIDE 0.9 % (FLUSH) 0.9 %
5-40 SYRINGE (ML) INJECTION PRN
Status: DISCONTINUED | OUTPATIENT
Start: 2025-04-04 | End: 2025-04-04

## 2025-04-04 RX ORDER — ONDANSETRON 2 MG/ML
8 INJECTION INTRAMUSCULAR; INTRAVENOUS ONCE
Status: COMPLETED | OUTPATIENT
Start: 2025-04-04 | End: 2025-04-04

## 2025-04-04 RX ORDER — SODIUM CHLORIDE 9 MG/ML
5-250 INJECTION, SOLUTION INTRAVENOUS PRN
Status: DISCONTINUED | OUTPATIENT
Start: 2025-04-04 | End: 2025-04-05 | Stop reason: HOSPADM

## 2025-04-04 RX ADMIN — SODIUM CHLORIDE 10 ML/HR: 0.9 INJECTION, SOLUTION INTRAVENOUS at 12:53

## 2025-04-04 RX ADMIN — GEMCITABINE HYDROCHLORIDE 41 MG: 200 INJECTION, POWDER, LYOPHILIZED, FOR SOLUTION INTRAVENOUS at 13:02

## 2025-04-04 RX ADMIN — ONDANSETRON 8 MG: 2 INJECTION, SOLUTION INTRAMUSCULAR; INTRAVENOUS at 12:51

## 2025-04-04 RX ADMIN — SODIUM CHLORIDE, PRESERVATIVE FREE 10 ML: 5 INJECTION INTRAVENOUS at 12:51

## 2025-04-04 RX ADMIN — SODIUM CHLORIDE, PRESERVATIVE FREE 20 ML: 5 INJECTION INTRAVENOUS at 11:55

## 2025-04-04 NOTE — PROGRESS NOTES
Patient to port lab for port access and lab draw. Port accessed per protocol; using 20g 0.75\" del a cruz needle without difficulty. Labs drawn from port and port flushed. Port remains accessed. Patient tolerated well. Discharged via wheelchair.

## 2025-04-04 NOTE — ON TREATMENT VISIT
[facility] RADIATION ONCOLOGY ON TREATMENT VISIT    Patient: Maria Alejandra Castillo MRN: [id number]  SSN: [id number]    YOB: 1945  Age: 79 y.o.  Sex: female      04/04/25    Diagnosis:   Cancer Staging   Urothelial carcinoma of bladder with invasion of muscle  Staging form: Urinary Bladder, AJCC 8th Edition  - Pathologic stage from 3/25/2025: Stage Unknown (pT2, pNX, cM0) - Signed by Dariel Gardiner MD on 3/25/2025      This is a 79 y.o. female who is currently receiving trimodality therapy for MIBC.    Current RT dose: 30.6/64.8 Gy in 17/36 fractions.     Concurrent systemic therapy: Gemzar    Subjective:  Week 1: No complaints apart from some nausea after her chemotherapy.  Week 2: Had a fall on Thursday evening but did not hit her head. She says she fell on her left side and hit her dresser on the lower left flank. This area is sore but has no other complaints.   Week 3: No complaints.  Week 4: No complaints.     Objective:  There were no vitals filed for this visit.    Pain 0/10    General: Alert and conversant, in NAD  Skin: no issues    Assessment:  Patient is tolerating radiation therapy well with no XRT issues.    Plan:  -Zofran for nausea.  -Continue RT as planned.  -Treatment images reviewed.  -The patient has a documented plan of care to address pain.  Pain is not present.      Carrie Styles MD  04/04/25

## 2025-04-07 ENCOUNTER — APPOINTMENT (OUTPATIENT)
Dept: RADIATION ONCOLOGY | Age: 80
End: 2025-04-07
Payer: MEDICARE

## 2025-04-07 PROBLEM — C68.9 UROTHELIAL CANCER (HCC): Status: ACTIVE | Noted: 2025-03-03

## 2025-04-08 ENCOUNTER — HOSPITAL ENCOUNTER (OUTPATIENT)
Dept: LAB | Age: 80
Discharge: HOME | End: 2025-04-08
Payer: MEDICARE

## 2025-04-08 ENCOUNTER — TELEPHONE (OUTPATIENT)
Dept: ONCOLOGY | Age: 80
End: 2025-04-08

## 2025-04-08 ENCOUNTER — HOSPITAL ENCOUNTER (OUTPATIENT)
Dept: INFUSION THERAPY | Age: 80
Setting detail: INFUSION SERIES
Discharge: HOME | End: 2025-04-08
Payer: MEDICARE

## 2025-04-08 ENCOUNTER — APPOINTMENT (OUTPATIENT)
Dept: RADIATION ONCOLOGY | Age: 80
End: 2025-04-08
Payer: MEDICARE

## 2025-04-08 ENCOUNTER — HOSPITAL ENCOUNTER (OUTPATIENT)
Dept: RADIATION ONCOLOGY | Age: 80
Setting detail: RECURRING SERIES
Discharge: HOME | End: 2025-04-11
Payer: MEDICARE

## 2025-04-08 VITALS
BODY MASS INDEX: 23.39 KG/M2 | TEMPERATURE: 97.9 F | RESPIRATION RATE: 16 BRPM | SYSTOLIC BLOOD PRESSURE: 148 MMHG | DIASTOLIC BLOOD PRESSURE: 80 MMHG | WEIGHT: 115.8 LBS | HEART RATE: 80 BPM | OXYGEN SATURATION: 95 %

## 2025-04-08 DIAGNOSIS — C67.9 UROTHELIAL CARCINOMA OF BLADDER WITH INVASION OF MUSCLE: Primary | ICD-10-CM

## 2025-04-08 LAB
ALBUMIN SERPL-MCNC: 2.8 G/DL (ref 3.2–4.6)
ALBUMIN/GLOB SERPL: 0.6 (ref 1–1.9)
ALP SERPL-CCNC: 113 U/L (ref 35–104)
ALT SERPL-CCNC: 13 U/L (ref 8–45)
ANION GAP SERPL CALC-SCNC: 13 MMOL/L (ref 7–16)
AST SERPL-CCNC: 21 U/L (ref 15–37)
BASOPHILS # BLD: 0.03 K/UL (ref 0–0.2)
BASOPHILS NFR BLD: 0.4 % (ref 0–2)
BILIRUB SERPL-MCNC: 0.4 MG/DL (ref 0–1.2)
BUN SERPL-MCNC: 13 MG/DL (ref 8–23)
CALCIUM SERPL-MCNC: 9.5 MG/DL (ref 8.8–10.2)
CHLORIDE SERPL-SCNC: 93 MMOL/L (ref 98–107)
CO2 SERPL-SCNC: 27 MMOL/L (ref 20–29)
CREAT SERPL-MCNC: 1.11 MG/DL (ref 0.6–1.1)
DIFFERENTIAL METHOD BLD: ABNORMAL
EOSINOPHIL # BLD: 0.04 K/UL (ref 0–0.8)
EOSINOPHIL NFR BLD: 0.5 % (ref 0.5–7.8)
ERYTHROCYTE [DISTWIDTH] IN BLOOD BY AUTOMATED COUNT: 23.5 % (ref 11.9–14.6)
GLOBULIN SER CALC-MCNC: 4.4 G/DL (ref 2.3–3.5)
GLUCOSE SERPL-MCNC: 328 MG/DL (ref 70–99)
HCT VFR BLD AUTO: 31.7 % (ref 35.8–46.3)
HGB BLD-MCNC: 9.6 G/DL (ref 11.7–15.4)
IMM GRANULOCYTES # BLD AUTO: 0.4 K/UL (ref 0–0.5)
IMM GRANULOCYTES NFR BLD AUTO: 4.9 % (ref 0–5)
LYMPHOCYTES # BLD: 0.24 K/UL (ref 0.5–4.6)
LYMPHOCYTES NFR BLD: 3 % (ref 13–44)
MCH RBC QN AUTO: 27 PG (ref 26.1–32.9)
MCHC RBC AUTO-ENTMCNC: 30.3 G/DL (ref 31.4–35)
MCV RBC AUTO: 89.3 FL (ref 82–102)
MONOCYTES # BLD: 0.56 K/UL (ref 0.1–1.3)
MONOCYTES NFR BLD: 6.9 % (ref 4–12)
NEUTS SEG # BLD: 6.86 K/UL (ref 1.7–8.2)
NEUTS SEG NFR BLD: 84.3 % (ref 43–78)
NRBC # BLD: 0.04 K/UL (ref 0–0.2)
PLATELET # BLD AUTO: 297 K/UL (ref 150–450)
PMV BLD AUTO: 10 FL (ref 9.4–12.3)
POTASSIUM SERPL-SCNC: 3.5 MMOL/L (ref 3.5–5.1)
PROT SERPL-MCNC: 7.1 G/DL (ref 6.3–8.2)
RAD ONC ARIA COURSE FIRST TREATMENT DATE: NORMAL
RAD ONC ARIA COURSE ID: NORMAL
RAD ONC ARIA COURSE INTENT: NORMAL
RAD ONC ARIA COURSE LAST TREATMENT DATE: NORMAL
RAD ONC ARIA COURSE SESSION NUMBER: 18
RAD ONC ARIA COURSE START DATE: NORMAL
RAD ONC ARIA COURSE TREATMENT ELAPSED DAYS: 28
RAD ONC ARIA PLAN FRACTIONS TREATED TO DATE: 18
RAD ONC ARIA PLAN ID: NORMAL
RAD ONC ARIA PLAN PRESCRIBED DOSE PER FRACTION: 1.8 GY
RAD ONC ARIA PLAN PRIMARY REFERENCE POINT: NORMAL
RAD ONC ARIA PLAN TOTAL FRACTIONS PRESCRIBED: 28
RAD ONC ARIA PLAN TOTAL PRESCRIBED DOSE: 5040 CGY
RAD ONC ARIA REFERENCE POINT DOSAGE GIVEN TO DATE: 32.4 GY
RAD ONC ARIA REFERENCE POINT ID: NORMAL
RAD ONC ARIA REFERENCE POINT SESSION DOSAGE GIVEN: 1.8 GY
RBC # BLD AUTO: 3.55 M/UL (ref 4.05–5.2)
SODIUM SERPL-SCNC: 133 MMOL/L (ref 136–145)
WBC # BLD AUTO: 8.1 K/UL (ref 4.3–11.1)

## 2025-04-08 PROCEDURE — 77386 HC NTSTY MODUL RAD TX DLVR CPLX: CPT

## 2025-04-08 PROCEDURE — 96413 CHEMO IV INFUSION 1 HR: CPT

## 2025-04-08 PROCEDURE — 2500000003 HC RX 250 WO HCPCS: Performed by: INTERNAL MEDICINE

## 2025-04-08 PROCEDURE — 96375 TX/PRO/DX INJ NEW DRUG ADDON: CPT

## 2025-04-08 PROCEDURE — 85025 COMPLETE CBC W/AUTO DIFF WBC: CPT

## 2025-04-08 PROCEDURE — 6360000002 HC RX W HCPCS: Performed by: INTERNAL MEDICINE

## 2025-04-08 PROCEDURE — 2580000003 HC RX 258: Performed by: INTERNAL MEDICINE

## 2025-04-08 PROCEDURE — 80053 COMPREHEN METABOLIC PANEL: CPT

## 2025-04-08 RX ORDER — SODIUM CHLORIDE 0.9 % (FLUSH) 0.9 %
5-40 SYRINGE (ML) INJECTION PRN
OUTPATIENT
Start: 2025-04-11

## 2025-04-08 RX ORDER — MEPERIDINE HYDROCHLORIDE 50 MG/ML
12.5 INJECTION INTRAMUSCULAR; INTRAVENOUS; SUBCUTANEOUS PRN
Status: CANCELLED | OUTPATIENT
Start: 2025-04-08

## 2025-04-08 RX ORDER — SODIUM CHLORIDE 9 MG/ML
5-250 INJECTION, SOLUTION INTRAVENOUS PRN
OUTPATIENT
Start: 2025-04-15

## 2025-04-08 RX ORDER — HYDROCORTISONE SODIUM SUCCINATE 100 MG/2ML
100 INJECTION INTRAMUSCULAR; INTRAVENOUS
OUTPATIENT
Start: 2025-04-18

## 2025-04-08 RX ORDER — ALBUTEROL SULFATE 90 UG/1
4 INHALANT RESPIRATORY (INHALATION) PRN
OUTPATIENT
Start: 2025-04-11

## 2025-04-08 RX ORDER — FAMOTIDINE 10 MG/ML
20 INJECTION, SOLUTION INTRAVENOUS
OUTPATIENT
Start: 2025-04-18

## 2025-04-08 RX ORDER — ACETAMINOPHEN 325 MG/1
650 TABLET ORAL
OUTPATIENT
Start: 2025-04-22

## 2025-04-08 RX ORDER — EPINEPHRINE 1 MG/ML
0.3 INJECTION, SOLUTION, CONCENTRATE INTRAVENOUS PRN
OUTPATIENT
Start: 2025-04-15

## 2025-04-08 RX ORDER — ALBUTEROL SULFATE 90 UG/1
4 INHALANT RESPIRATORY (INHALATION) PRN
OUTPATIENT
Start: 2025-04-18

## 2025-04-08 RX ORDER — SODIUM CHLORIDE 0.9 % (FLUSH) 0.9 %
5-40 SYRINGE (ML) INJECTION PRN
OUTPATIENT
Start: 2025-04-18

## 2025-04-08 RX ORDER — ONDANSETRON 2 MG/ML
8 INJECTION INTRAMUSCULAR; INTRAVENOUS ONCE
OUTPATIENT
Start: 2025-04-11 | End: 2025-04-11

## 2025-04-08 RX ORDER — SODIUM CHLORIDE 9 MG/ML
INJECTION, SOLUTION INTRAVENOUS CONTINUOUS
OUTPATIENT
Start: 2025-04-18

## 2025-04-08 RX ORDER — SODIUM CHLORIDE 9 MG/ML
5-250 INJECTION, SOLUTION INTRAVENOUS PRN
Status: CANCELLED | OUTPATIENT
Start: 2025-04-08

## 2025-04-08 RX ORDER — ONDANSETRON 2 MG/ML
8 INJECTION INTRAMUSCULAR; INTRAVENOUS
OUTPATIENT
Start: 2025-04-18

## 2025-04-08 RX ORDER — SODIUM CHLORIDE 0.9 % (FLUSH) 0.9 %
5-40 SYRINGE (ML) INJECTION PRN
OUTPATIENT
Start: 2025-04-15

## 2025-04-08 RX ORDER — ALBUTEROL SULFATE 90 UG/1
4 INHALANT RESPIRATORY (INHALATION) PRN
Status: DISCONTINUED | OUTPATIENT
Start: 2025-04-08 | End: 2025-04-09 | Stop reason: HOSPADM

## 2025-04-08 RX ORDER — MEPERIDINE HYDROCHLORIDE 50 MG/ML
12.5 INJECTION INTRAMUSCULAR; INTRAVENOUS; SUBCUTANEOUS PRN
OUTPATIENT
Start: 2025-04-22

## 2025-04-08 RX ORDER — EPINEPHRINE 1 MG/ML
0.3 INJECTION, SOLUTION, CONCENTRATE INTRAVENOUS PRN
Status: DISCONTINUED | OUTPATIENT
Start: 2025-04-08 | End: 2025-04-09 | Stop reason: HOSPADM

## 2025-04-08 RX ORDER — ONDANSETRON 2 MG/ML
8 INJECTION INTRAMUSCULAR; INTRAVENOUS
OUTPATIENT
Start: 2025-04-22

## 2025-04-08 RX ORDER — ACETAMINOPHEN 325 MG/1
650 TABLET ORAL
OUTPATIENT
Start: 2025-04-15 | End: 2025-04-16

## 2025-04-08 RX ORDER — DIPHENHYDRAMINE HYDROCHLORIDE 50 MG/ML
50 INJECTION, SOLUTION INTRAMUSCULAR; INTRAVENOUS
Status: DISCONTINUED | OUTPATIENT
Start: 2025-04-08 | End: 2025-04-09 | Stop reason: HOSPADM

## 2025-04-08 RX ORDER — ACETAMINOPHEN 325 MG/1
650 TABLET ORAL
Status: CANCELLED | OUTPATIENT
Start: 2025-04-08 | End: 2025-04-09

## 2025-04-08 RX ORDER — ONDANSETRON 2 MG/ML
8 INJECTION INTRAMUSCULAR; INTRAVENOUS ONCE
Status: CANCELLED | OUTPATIENT
Start: 2025-04-08 | End: 2025-04-08

## 2025-04-08 RX ORDER — HEPARIN SODIUM (PORCINE) LOCK FLUSH IV SOLN 100 UNIT/ML 100 UNIT/ML
500 SOLUTION INTRAVENOUS PRN
OUTPATIENT
Start: 2025-04-22

## 2025-04-08 RX ORDER — HEPARIN SODIUM (PORCINE) LOCK FLUSH IV SOLN 100 UNIT/ML 100 UNIT/ML
500 SOLUTION INTRAVENOUS PRN
OUTPATIENT
Start: 2025-04-18

## 2025-04-08 RX ORDER — EPINEPHRINE 1 MG/ML
0.3 INJECTION, SOLUTION, CONCENTRATE INTRAVENOUS PRN
Status: CANCELLED | OUTPATIENT
Start: 2025-04-08

## 2025-04-08 RX ORDER — FAMOTIDINE 10 MG/ML
20 INJECTION, SOLUTION INTRAVENOUS
Status: CANCELLED | OUTPATIENT
Start: 2025-04-08 | End: 2025-04-09

## 2025-04-08 RX ORDER — SODIUM CHLORIDE 9 MG/ML
5-250 INJECTION, SOLUTION INTRAVENOUS PRN
OUTPATIENT
Start: 2025-04-18

## 2025-04-08 RX ORDER — HYDROCORTISONE SODIUM SUCCINATE 100 MG/2ML
100 INJECTION INTRAMUSCULAR; INTRAVENOUS
OUTPATIENT
Start: 2025-04-11 | End: 2025-04-12

## 2025-04-08 RX ORDER — ACETAMINOPHEN 325 MG/1
650 TABLET ORAL
OUTPATIENT
Start: 2025-04-18

## 2025-04-08 RX ORDER — ACETAMINOPHEN 325 MG/1
650 TABLET ORAL
Status: DISCONTINUED | OUTPATIENT
Start: 2025-04-08 | End: 2025-04-09 | Stop reason: HOSPADM

## 2025-04-08 RX ORDER — ONDANSETRON 2 MG/ML
8 INJECTION INTRAMUSCULAR; INTRAVENOUS ONCE
OUTPATIENT
Start: 2025-04-18 | End: 2025-04-18

## 2025-04-08 RX ORDER — HEPARIN SODIUM (PORCINE) LOCK FLUSH IV SOLN 100 UNIT/ML 100 UNIT/ML
500 SOLUTION INTRAVENOUS PRN
Status: CANCELLED | OUTPATIENT
Start: 2025-04-08

## 2025-04-08 RX ORDER — SODIUM CHLORIDE 0.9 % (FLUSH) 0.9 %
5-40 SYRINGE (ML) INJECTION PRN
Status: DISCONTINUED | OUTPATIENT
Start: 2025-04-08 | End: 2025-04-09 | Stop reason: HOSPADM

## 2025-04-08 RX ORDER — ALBUTEROL SULFATE 90 UG/1
4 INHALANT RESPIRATORY (INHALATION) PRN
OUTPATIENT
Start: 2025-04-22

## 2025-04-08 RX ORDER — MEPERIDINE HYDROCHLORIDE 50 MG/ML
12.5 INJECTION INTRAMUSCULAR; INTRAVENOUS; SUBCUTANEOUS PRN
OUTPATIENT
Start: 2025-04-15

## 2025-04-08 RX ORDER — MEPERIDINE HYDROCHLORIDE 50 MG/ML
12.5 INJECTION INTRAMUSCULAR; INTRAVENOUS; SUBCUTANEOUS PRN
OUTPATIENT
Start: 2025-04-11

## 2025-04-08 RX ORDER — EPINEPHRINE 1 MG/ML
0.3 INJECTION, SOLUTION, CONCENTRATE INTRAVENOUS PRN
OUTPATIENT
Start: 2025-04-18

## 2025-04-08 RX ORDER — DIPHENHYDRAMINE HYDROCHLORIDE 50 MG/ML
50 INJECTION, SOLUTION INTRAMUSCULAR; INTRAVENOUS
OUTPATIENT
Start: 2025-04-18

## 2025-04-08 RX ORDER — DIPHENHYDRAMINE HYDROCHLORIDE 50 MG/ML
50 INJECTION, SOLUTION INTRAMUSCULAR; INTRAVENOUS
Status: CANCELLED | OUTPATIENT
Start: 2025-04-08 | End: 2025-04-09

## 2025-04-08 RX ORDER — HYDROCORTISONE SODIUM SUCCINATE 100 MG/2ML
100 INJECTION INTRAMUSCULAR; INTRAVENOUS
OUTPATIENT
Start: 2025-04-15 | End: 2025-04-16

## 2025-04-08 RX ORDER — SODIUM CHLORIDE 9 MG/ML
INJECTION, SOLUTION INTRAVENOUS CONTINUOUS
OUTPATIENT
Start: 2025-04-15

## 2025-04-08 RX ORDER — ALBUTEROL SULFATE 90 UG/1
4 INHALANT RESPIRATORY (INHALATION) PRN
OUTPATIENT
Start: 2025-04-15

## 2025-04-08 RX ORDER — HEPARIN SODIUM (PORCINE) LOCK FLUSH IV SOLN 100 UNIT/ML 100 UNIT/ML
500 SOLUTION INTRAVENOUS PRN
OUTPATIENT
Start: 2025-04-11

## 2025-04-08 RX ORDER — HYDROCORTISONE SODIUM SUCCINATE 100 MG/2ML
100 INJECTION INTRAMUSCULAR; INTRAVENOUS
Status: CANCELLED | OUTPATIENT
Start: 2025-04-08 | End: 2025-04-09

## 2025-04-08 RX ORDER — HYDROCORTISONE SODIUM SUCCINATE 100 MG/2ML
100 INJECTION INTRAMUSCULAR; INTRAVENOUS
Status: DISCONTINUED | OUTPATIENT
Start: 2025-04-08 | End: 2025-04-09 | Stop reason: HOSPADM

## 2025-04-08 RX ORDER — SODIUM CHLORIDE 9 MG/ML
INJECTION, SOLUTION INTRAVENOUS CONTINUOUS
Status: CANCELLED | OUTPATIENT
Start: 2025-04-08

## 2025-04-08 RX ORDER — SODIUM CHLORIDE 9 MG/ML
5-250 INJECTION, SOLUTION INTRAVENOUS PRN
OUTPATIENT
Start: 2025-04-11

## 2025-04-08 RX ORDER — SODIUM CHLORIDE 9 MG/ML
5-250 INJECTION, SOLUTION INTRAVENOUS PRN
OUTPATIENT
Start: 2025-04-22

## 2025-04-08 RX ORDER — MEPERIDINE HYDROCHLORIDE 25 MG/ML
12.5 INJECTION INTRAMUSCULAR; INTRAVENOUS; SUBCUTANEOUS PRN
Status: DISCONTINUED | OUTPATIENT
Start: 2025-04-08 | End: 2025-04-09 | Stop reason: HOSPADM

## 2025-04-08 RX ORDER — ALBUTEROL SULFATE 90 UG/1
4 INHALANT RESPIRATORY (INHALATION) PRN
Status: CANCELLED | OUTPATIENT
Start: 2025-04-08

## 2025-04-08 RX ORDER — ONDANSETRON 2 MG/ML
8 INJECTION INTRAMUSCULAR; INTRAVENOUS
Status: DISCONTINUED | OUTPATIENT
Start: 2025-04-08 | End: 2025-04-08 | Stop reason: HOSPADM

## 2025-04-08 RX ORDER — EPINEPHRINE 1 MG/ML
0.3 INJECTION, SOLUTION, CONCENTRATE INTRAVENOUS PRN
OUTPATIENT
Start: 2025-04-11

## 2025-04-08 RX ORDER — SODIUM CHLORIDE 9 MG/ML
INJECTION, SOLUTION INTRAVENOUS CONTINUOUS
OUTPATIENT
Start: 2025-04-11

## 2025-04-08 RX ORDER — SODIUM CHLORIDE 0.9 % (FLUSH) 0.9 %
5-40 SYRINGE (ML) INJECTION PRN
OUTPATIENT
Start: 2025-04-22

## 2025-04-08 RX ORDER — FAMOTIDINE 10 MG/ML
20 INJECTION, SOLUTION INTRAVENOUS
OUTPATIENT
Start: 2025-04-11 | End: 2025-04-12

## 2025-04-08 RX ORDER — FAMOTIDINE 10 MG/ML
20 INJECTION, SOLUTION INTRAVENOUS
OUTPATIENT
Start: 2025-04-15 | End: 2025-04-16

## 2025-04-08 RX ORDER — ACETAMINOPHEN 325 MG/1
650 TABLET ORAL
OUTPATIENT
Start: 2025-04-11

## 2025-04-08 RX ORDER — DIPHENHYDRAMINE HYDROCHLORIDE 50 MG/ML
50 INJECTION, SOLUTION INTRAMUSCULAR; INTRAVENOUS
OUTPATIENT
Start: 2025-04-15 | End: 2025-04-16

## 2025-04-08 RX ORDER — SODIUM CHLORIDE 0.9 % (FLUSH) 0.9 %
5-40 SYRINGE (ML) INJECTION PRN
Status: CANCELLED | OUTPATIENT
Start: 2025-04-08

## 2025-04-08 RX ORDER — ONDANSETRON 2 MG/ML
8 INJECTION INTRAMUSCULAR; INTRAVENOUS ONCE
OUTPATIENT
Start: 2025-04-15 | End: 2025-04-15

## 2025-04-08 RX ORDER — MEPERIDINE HYDROCHLORIDE 50 MG/ML
12.5 INJECTION INTRAMUSCULAR; INTRAVENOUS; SUBCUTANEOUS PRN
OUTPATIENT
Start: 2025-04-18

## 2025-04-08 RX ORDER — HYDROCORTISONE SODIUM SUCCINATE 100 MG/2ML
100 INJECTION INTRAMUSCULAR; INTRAVENOUS
OUTPATIENT
Start: 2025-04-22

## 2025-04-08 RX ORDER — SODIUM CHLORIDE 9 MG/ML
INJECTION, SOLUTION INTRAVENOUS CONTINUOUS
OUTPATIENT
Start: 2025-04-22

## 2025-04-08 RX ORDER — FAMOTIDINE 10 MG/ML
20 INJECTION, SOLUTION INTRAVENOUS
OUTPATIENT
Start: 2025-04-22

## 2025-04-08 RX ORDER — ONDANSETRON 2 MG/ML
8 INJECTION INTRAMUSCULAR; INTRAVENOUS ONCE
Status: COMPLETED | OUTPATIENT
Start: 2025-04-08 | End: 2025-04-08

## 2025-04-08 RX ORDER — HEPARIN SODIUM (PORCINE) LOCK FLUSH IV SOLN 100 UNIT/ML 100 UNIT/ML
500 SOLUTION INTRAVENOUS PRN
OUTPATIENT
Start: 2025-04-15

## 2025-04-08 RX ORDER — DIPHENHYDRAMINE HYDROCHLORIDE 50 MG/ML
50 INJECTION, SOLUTION INTRAMUSCULAR; INTRAVENOUS
OUTPATIENT
Start: 2025-04-11 | End: 2025-04-12

## 2025-04-08 RX ORDER — ONDANSETRON 2 MG/ML
8 INJECTION INTRAMUSCULAR; INTRAVENOUS ONCE
OUTPATIENT
Start: 2025-04-22 | End: 2025-04-22

## 2025-04-08 RX ORDER — DIPHENHYDRAMINE HYDROCHLORIDE 50 MG/ML
50 INJECTION, SOLUTION INTRAMUSCULAR; INTRAVENOUS
OUTPATIENT
Start: 2025-04-22

## 2025-04-08 RX ORDER — ONDANSETRON 2 MG/ML
8 INJECTION INTRAMUSCULAR; INTRAVENOUS
OUTPATIENT
Start: 2025-04-11

## 2025-04-08 RX ORDER — ONDANSETRON 2 MG/ML
8 INJECTION INTRAMUSCULAR; INTRAVENOUS
OUTPATIENT
Start: 2025-04-15 | End: 2025-04-16

## 2025-04-08 RX ORDER — ONDANSETRON 2 MG/ML
8 INJECTION INTRAMUSCULAR; INTRAVENOUS
Status: CANCELLED | OUTPATIENT
Start: 2025-04-08 | End: 2025-04-08

## 2025-04-08 RX ORDER — EPINEPHRINE 1 MG/ML
0.3 INJECTION, SOLUTION, CONCENTRATE INTRAVENOUS PRN
OUTPATIENT
Start: 2025-04-22

## 2025-04-08 RX ADMIN — SODIUM CHLORIDE, PRESERVATIVE FREE 10 ML: 5 INJECTION INTRAVENOUS at 11:15

## 2025-04-08 RX ADMIN — SODIUM CHLORIDE, PRESERVATIVE FREE 20 ML: 5 INJECTION INTRAVENOUS at 10:08

## 2025-04-08 RX ADMIN — ONDANSETRON 8 MG: 2 INJECTION, SOLUTION INTRAMUSCULAR; INTRAVENOUS at 11:31

## 2025-04-08 RX ADMIN — GEMCITABINE HYDROCHLORIDE 41 MG: 200 INJECTION, POWDER, LYOPHILIZED, FOR SOLUTION INTRAVENOUS at 11:35

## 2025-04-08 NOTE — PROGRESS NOTES
Spiritual Health History and Assessment/Progress Note  [facility]    Follow-up          Name: Maria Alejandra Castillo MRN: [id number]    Age: 79 y.o.     Sex: female   Language: English   Druze: Judaism   <principal problem not specified>     Date: 4/8/2025                        Spiritual Assessment began in [facility] INFUSION [facility]            Encounter Overview/Reason: Follow-up  Service Provided For: Patient    Galilea, Belief, Meaning:   Patient is connected with a galilea tradition or spiritual practice and has beliefs or practices that help with coping during difficult times  Family/Friends No family/friends present      Importance and Influence:  Patient has spiritual/personal beliefs that influence decisions regarding their health  Family/Friends No family/friends present    Community:  Patient is connected with a spiritual community  Family/Friends No family/friends present    Assessment and Plan of Care:     Patient Interventions include: Facilitated expression of thoughts and feelings, Explored spiritual coping/struggle/distress, Engaged in theological reflection, Affirmed coping skills/support systems, Provided sacramental/Restoration ritual, and Facilitated life review and/ or legacy  Family/Friends Interventions include: No family/friends present    Patient Plan of Care: Spiritual Care available upon further referral  Family/Friends Plan of Care: No family/friends present    Electronically signed by VITOR Dejesus on 4/8/2025 at 3:45 PM

## 2025-04-08 NOTE — TELEPHONE ENCOUNTER
Physician provider: Dr. Gardiner  Reason for today's call (Please detail here patients chief complaint): Ride doesn't appear to be arranged for today's appt's   Last office visit:na  Patient Callback Number: [phone]  Was callback number verified?: Yes  Preferred pharmacy (If refill request): na  Veriified that patient confirmed no refills left at pharmacy? :No  Calls to office within the last 48 hours?:No    Warm Transfer to (For Red Words): na    Patient notified that their information will be routed to the [facility] clinical triage team for review. Patient is advised that they will receive a phone call from the triage department. If symptom related and symptoms worsen before receiving a call back, the patient has been advised to proceed to the nearest ED.      Pt daughter in law Livia called to check on transportation arrangements for today's visits. Nothing appears to be noted in the chart. Pt states she spoke with [place] and [place] and all transportation was to be arranged. Pt has appt's starting today at 8:45. Pt will not be here unless transportation arrives to pick her up.

## 2025-04-08 NOTE — PROGRESS NOTES
Patient arrived to Infusion Center for Gemzar. Assessment completed.  No needs voiced at this time. Patient tolerated infusion well and is aware of next appointment on 4/11/25 @1115.  Patient discharged via wheelchair to [facility].

## 2025-04-09 ENCOUNTER — HOSPITAL ENCOUNTER (OUTPATIENT)
Dept: RADIATION ONCOLOGY | Age: 80
Setting detail: RECURRING SERIES
Discharge: HOME | End: 2025-04-12
Payer: MEDICARE

## 2025-04-09 ENCOUNTER — CLINICAL DOCUMENTATION (OUTPATIENT)
Dept: CASE MANAGEMENT | Age: 80
End: 2025-04-09

## 2025-04-09 LAB
RAD ONC ARIA COURSE FIRST TREATMENT DATE: NORMAL
RAD ONC ARIA COURSE ID: NORMAL
RAD ONC ARIA COURSE INTENT: NORMAL
RAD ONC ARIA COURSE LAST TREATMENT DATE: NORMAL
RAD ONC ARIA COURSE SESSION NUMBER: 19
RAD ONC ARIA COURSE START DATE: NORMAL
RAD ONC ARIA COURSE TREATMENT ELAPSED DAYS: 29
RAD ONC ARIA PLAN FRACTIONS TREATED TO DATE: 19
RAD ONC ARIA PLAN ID: NORMAL
RAD ONC ARIA PLAN PRESCRIBED DOSE PER FRACTION: 1.8 GY
RAD ONC ARIA PLAN PRIMARY REFERENCE POINT: NORMAL
RAD ONC ARIA PLAN TOTAL FRACTIONS PRESCRIBED: 28
RAD ONC ARIA PLAN TOTAL PRESCRIBED DOSE: 5040 CGY
RAD ONC ARIA REFERENCE POINT DOSAGE GIVEN TO DATE: 34.2 GY
RAD ONC ARIA REFERENCE POINT ID: NORMAL
RAD ONC ARIA REFERENCE POINT SESSION DOSAGE GIVEN: 1.8 GY

## 2025-04-09 PROCEDURE — 77386 HC NTSTY MODUL RAD TX DLVR CPLX: CPT

## 2025-04-09 NOTE — PROGRESS NOTES
Nurse Navigation outreach related to care coordination    Other Received call from pt reporting she was waiting for her ride home. Informed her the ride home is from [facility]. Pt requested a copy of her appts. Calendar printed and taken to pt in lobby. Waited with pt and assisted her to [facility] 's car.  is known to the pt. Also spoke with pts daughter in law, Livia and provided a financial assistance application via email.    Navigation Assessment:  The following were identified as potential barriers to care:   1. Transportation insecurity.     Interventions and Plan:  SW providing transportation assistance for gaps in [facility] ride coverage.     Goal of next outreach: Check status of rides for following week of treatment  Time Spent: 30 minutes

## 2025-04-10 ENCOUNTER — CLINICAL DOCUMENTATION (OUTPATIENT)
Dept: CASE MANAGEMENT | Age: 80
End: 2025-04-10

## 2025-04-10 ENCOUNTER — TELEPHONE (OUTPATIENT)
Dept: RADIATION ONCOLOGY | Age: 80
End: 2025-04-10

## 2025-04-10 ENCOUNTER — HOSPITAL ENCOUNTER (OUTPATIENT)
Dept: RADIATION ONCOLOGY | Age: 80
Setting detail: RECURRING SERIES
End: 2025-04-10
Payer: MEDICARE

## 2025-04-10 RX ORDER — ONDANSETRON 8 MG/1
8 TABLET, ORALLY DISINTEGRATING ORAL EVERY 8 HOURS PRN
Qty: 90 TABLET | Refills: 0 | Status: SHIPPED | OUTPATIENT
Start: 2025-04-10

## 2025-04-10 NOTE — PROGRESS NOTES
Nurse Navigation outreach related to care coordination    Other Received call from pts son Zev reporting that she had cancelled her radiation appt today due to weakness and fatigue. States that pt fell in the bathroom last night and had to be assisted up and to bed. Pt did not sustain any injury with this fall. Pt reports that she \"slid off the toilet.\" Pt also fell on Tuesday 4/8/25 after returning home from her chemo infusion. Pt scraped her elbow and knee. EMS had to come assist pt up. Pt states that she is eating and drinking well but also states that Zofran is not working very well. Discussed increasing dose with Dr. Gardiner. Verbal order for zofran 8mg ODT TID received. Order entered and routed to Dr. Gardiner for signature. Informed Zev that new prescription was being sent. Pt or family will call in the morning if Maria Alejandra is unable to make it to her appt.    Navigation Assessment:  The following were identified as potential barriers to care:   1. Transportation insecurity.     Interventions and Plan:  As above.    Goal of next outreach: Check status of transportation for following week.  Time Spent: 60 minutes      
.

## 2025-04-10 NOTE — TELEPHONE ENCOUNTER
Pt son is calling to let us know she will not be here today she is very sick. He asked that her ride be cancelled today. Will send a message to Rick Monique as well.

## 2025-04-11 ENCOUNTER — HOSPITAL ENCOUNTER (OUTPATIENT)
Dept: INFUSION THERAPY | Age: 80
Setting detail: INFUSION SERIES
End: 2025-04-11

## 2025-04-11 ENCOUNTER — APPOINTMENT (OUTPATIENT)
Dept: RADIATION ONCOLOGY | Age: 80
End: 2025-04-11
Payer: MEDICARE

## 2025-04-11 ENCOUNTER — CLINICAL DOCUMENTATION (OUTPATIENT)
Dept: CASE MANAGEMENT | Age: 80
End: 2025-04-11

## 2025-04-11 NOTE — PROGRESS NOTES
Nurse Navigation outreach related to care coordination    Other Received call from pts son Zev at 0934 stating that Maria Alejandra is not coming to her appts today due to not sleeping well last night. Message sent to , rad onc team, med onc team, and infusion scheduling. [phone]- Called and left VM for pts daughter in law, Livia to discuss if transportation assistance is needed for next week. Left VM requesting a call back.    Navigation Assessment:  The following were identified as potential barriers to care:   1. Transportation insecurity.     Interventions and Plan:  As above.    Goal of next outreach: Check status of transportation  Time Spent: 10 minutes

## 2025-04-14 ENCOUNTER — CLINICAL DOCUMENTATION (OUTPATIENT)
Dept: CASE MANAGEMENT | Age: 80
End: 2025-04-14

## 2025-04-14 ENCOUNTER — HOSPITAL ENCOUNTER (OUTPATIENT)
Dept: RADIATION ONCOLOGY | Age: 80
Setting detail: RECURRING SERIES
Discharge: HOME | End: 2025-04-17
Payer: MEDICARE

## 2025-04-14 VITALS
SYSTOLIC BLOOD PRESSURE: 172 MMHG | HEART RATE: 77 BPM | TEMPERATURE: 97.9 F | OXYGEN SATURATION: 96 % | DIASTOLIC BLOOD PRESSURE: 70 MMHG

## 2025-04-14 LAB
RAD ONC ARIA COURSE FIRST TREATMENT DATE: NORMAL
RAD ONC ARIA COURSE ID: NORMAL
RAD ONC ARIA COURSE INTENT: NORMAL
RAD ONC ARIA COURSE LAST TREATMENT DATE: NORMAL
RAD ONC ARIA COURSE SESSION NUMBER: 20
RAD ONC ARIA COURSE START DATE: NORMAL
RAD ONC ARIA COURSE TREATMENT ELAPSED DAYS: 34
RAD ONC ARIA PLAN FRACTIONS TREATED TO DATE: 20
RAD ONC ARIA PLAN ID: NORMAL
RAD ONC ARIA PLAN PRESCRIBED DOSE PER FRACTION: 1.8 GY
RAD ONC ARIA PLAN PRIMARY REFERENCE POINT: NORMAL
RAD ONC ARIA PLAN TOTAL FRACTIONS PRESCRIBED: 28
RAD ONC ARIA PLAN TOTAL PRESCRIBED DOSE: 5040 CGY
RAD ONC ARIA REFERENCE POINT DOSAGE GIVEN TO DATE: 36 GY
RAD ONC ARIA REFERENCE POINT ID: NORMAL
RAD ONC ARIA REFERENCE POINT SESSION DOSAGE GIVEN: 1.8 GY

## 2025-04-14 PROCEDURE — 77336 RADIATION PHYSICS CONSULT: CPT

## 2025-04-14 PROCEDURE — 77386 HC NTSTY MODUL RAD TX DLVR CPLX: CPT

## 2025-04-14 NOTE — ON TREATMENT VISIT
[facility] RADIATION ONCOLOGY ON TREATMENT VISIT    Patient: Maria Alejandra Castillo MRN: [id number]  SSN: [id number]    YOB: 1945  Age: 79 y.o.  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 female who is currently receiving trimodality therapy for MIBC.    Current RT dose: 30.6/64.8 Gy in 20/36 fractions.     Concurrent systemic therapy: Gemzar    Subjective:  Week 1: No complaints apart from some nausea after her chemotherapy.  Week 2: Had a fall on Thursday evening but did not hit her head. She says she fell on her left side and hit her dresser on the lower left flank. This area is sore but has no other complaints.   Week 3: No complaints.  Week 4: No complaints.   Week 5: Burning with urination.    Objective:  Vitals:    04/14/25 1109   BP: (!) 172/70   Pulse: 77   Temp: 97.9 °F (36.6 °C)   TempSrc: Oral   SpO2: 96%       Pain 0/10    General: Alert and conversant, in NAD  Skin: no issues    Assessment:  Patient is tolerating radiation therapy well with anticipated XRT issues.    Plan:  -Zofran for nausea.  -Continue RT as planned.  -Treatment images reviewed.  -Azo/ Ibuprofen for urinary symptoms.   -The patient has a documented plan of care to address pain.  Pain is not present.      Carrie Styles MD  04/14/25

## 2025-04-14 NOTE — PROGRESS NOTES
Nurse Navigation outreach related to care coordination    Other Spoke with pts son and daughter in law regarding transportation needs for this week. Ride needs sent via email and shared with social work team.    Navigation Assessment:  The following were identified as potential barriers to care:   1. Transportation insecurity     Interventions and Plan:  Follow up regarding needs for following week.    Goal of next outreach: Assess transportation needs.  Time Spent: 15 minutes

## 2025-04-15 ENCOUNTER — HOSPITAL ENCOUNTER (OUTPATIENT)
Dept: RADIATION ONCOLOGY | Age: 80
Setting detail: RECURRING SERIES
Discharge: HOME | End: 2025-04-18
Payer: MEDICARE

## 2025-04-15 ENCOUNTER — HOSPITAL ENCOUNTER (OUTPATIENT)
Dept: LAB | Age: 80
Discharge: HOME | End: 2025-04-15
Payer: MEDICARE

## 2025-04-15 ENCOUNTER — HOSPITAL ENCOUNTER (OUTPATIENT)
Dept: INFUSION THERAPY | Age: 80
Setting detail: INFUSION SERIES
Discharge: HOME | End: 2025-04-15
Payer: MEDICARE

## 2025-04-15 VITALS
WEIGHT: 114 LBS | OXYGEN SATURATION: 95 % | RESPIRATION RATE: 16 BRPM | SYSTOLIC BLOOD PRESSURE: 158 MMHG | DIASTOLIC BLOOD PRESSURE: 60 MMHG | HEART RATE: 77 BPM | BODY MASS INDEX: 23.03 KG/M2 | TEMPERATURE: 98.2 F

## 2025-04-15 DIAGNOSIS — C67.9 UROTHELIAL CARCINOMA OF BLADDER WITH INVASION OF MUSCLE: ICD-10-CM

## 2025-04-15 DIAGNOSIS — C67.9 UROTHELIAL CARCINOMA OF BLADDER WITH INVASION OF MUSCLE (HCC): Primary | ICD-10-CM

## 2025-04-15 LAB
ALBUMIN SERPL-MCNC: 2.5 G/DL (ref 3.2–4.6)
ALBUMIN/GLOB SERPL: 0.6 (ref 1–1.9)
ALP SERPL-CCNC: 118 U/L (ref 35–104)
ALT SERPL-CCNC: 13 U/L (ref 8–45)
ANION GAP SERPL CALC-SCNC: 13 MMOL/L (ref 7–16)
AST SERPL-CCNC: 17 U/L (ref 15–37)
BASOPHILS # BLD: 0.1 K/UL (ref 0–0.2)
BASOPHILS NFR BLD: 1 % (ref 0–2)
BILIRUB SERPL-MCNC: <0.2 MG/DL (ref 0–1.2)
BUN SERPL-MCNC: 19 MG/DL (ref 8–23)
CALCIUM SERPL-MCNC: 9.5 MG/DL (ref 8.8–10.2)
CHLORIDE SERPL-SCNC: 98 MMOL/L (ref 98–107)
CO2 SERPL-SCNC: 27 MMOL/L (ref 20–29)
CREAT SERPL-MCNC: 1.07 MG/DL (ref 0.6–1.1)
DIFFERENTIAL METHOD BLD: ABNORMAL
EOSINOPHIL # BLD: 0.1 K/UL (ref 0–0.8)
EOSINOPHIL NFR BLD: 1 % (ref 0.5–7.8)
ERYTHROCYTE [DISTWIDTH] IN BLOOD BY AUTOMATED COUNT: 24.5 % (ref 11.9–14.6)
GLOBULIN SER CALC-MCNC: 4.2 G/DL (ref 2.3–3.5)
GLUCOSE SERPL-MCNC: 248 MG/DL (ref 70–99)
HCT VFR BLD AUTO: 30.6 % (ref 35.8–46.3)
HGB BLD-MCNC: 9.3 G/DL (ref 11.7–15.4)
IMM GRANULOCYTES # BLD AUTO: 0.78 K/UL (ref 0–0.5)
IMM GRANULOCYTES NFR BLD AUTO: 8 % (ref 0–5)
LYMPHOCYTES # BLD: 0.39 K/UL (ref 0.5–4.6)
LYMPHOCYTES NFR BLD: 4 % (ref 13–44)
MCH RBC QN AUTO: 27.4 PG (ref 26.1–32.9)
MCHC RBC AUTO-ENTMCNC: 30.4 G/DL (ref 31.4–35)
MCV RBC AUTO: 90.3 FL (ref 82–102)
MONOCYTES # BLD: 0.87 K/UL (ref 0.1–1.3)
MONOCYTES NFR BLD: 9 % (ref 4–12)
NEUTS SEG # BLD: 7.46 K/UL (ref 1.7–8.2)
NEUTS SEG NFR BLD: 77 % (ref 43–78)
NRBC # BLD: 0 K/UL (ref 0–0.2)
PLATELET # BLD AUTO: 391 K/UL (ref 150–450)
PLATELET COMMENT: ADEQUATE
PMV BLD AUTO: 9.9 FL (ref 9.4–12.3)
POTASSIUM SERPL-SCNC: 3.5 MMOL/L (ref 3.5–5.1)
PROT SERPL-MCNC: 6.8 G/DL (ref 6.3–8.2)
RAD ONC ARIA COURSE FIRST TREATMENT DATE: NORMAL
RAD ONC ARIA COURSE ID: NORMAL
RAD ONC ARIA COURSE INTENT: NORMAL
RAD ONC ARIA COURSE LAST TREATMENT DATE: NORMAL
RAD ONC ARIA COURSE SESSION NUMBER: 21
RAD ONC ARIA COURSE START DATE: NORMAL
RAD ONC ARIA COURSE TREATMENT ELAPSED DAYS: 35
RAD ONC ARIA PLAN FRACTIONS TREATED TO DATE: 21
RAD ONC ARIA PLAN ID: NORMAL
RAD ONC ARIA PLAN PRESCRIBED DOSE PER FRACTION: 1.8 GY
RAD ONC ARIA PLAN PRIMARY REFERENCE POINT: NORMAL
RAD ONC ARIA PLAN TOTAL FRACTIONS PRESCRIBED: 28
RAD ONC ARIA PLAN TOTAL PRESCRIBED DOSE: 5040 CGY
RAD ONC ARIA REFERENCE POINT DOSAGE GIVEN TO DATE: 37.8 GY
RAD ONC ARIA REFERENCE POINT ID: NORMAL
RAD ONC ARIA REFERENCE POINT SESSION DOSAGE GIVEN: 1.8 GY
RBC # BLD AUTO: 3.39 M/UL (ref 4.05–5.2)
RBC MORPH BLD: ABNORMAL
SODIUM SERPL-SCNC: 138 MMOL/L (ref 136–145)
WBC # BLD AUTO: 9.7 K/UL (ref 4.3–11.1)
WBC MORPH BLD: ABNORMAL

## 2025-04-15 PROCEDURE — 77386 HC NTSTY MODUL RAD TX DLVR CPLX: CPT

## 2025-04-15 PROCEDURE — 96413 CHEMO IV INFUSION 1 HR: CPT

## 2025-04-15 PROCEDURE — 2500000003 HC RX 250 WO HCPCS: Performed by: INTERNAL MEDICINE

## 2025-04-15 PROCEDURE — 85025 COMPLETE CBC W/AUTO DIFF WBC: CPT

## 2025-04-15 PROCEDURE — 96375 TX/PRO/DX INJ NEW DRUG ADDON: CPT

## 2025-04-15 PROCEDURE — 2580000003 HC RX 258: Performed by: INTERNAL MEDICINE

## 2025-04-15 PROCEDURE — 6360000002 HC RX W HCPCS: Performed by: INTERNAL MEDICINE

## 2025-04-15 PROCEDURE — 80053 COMPREHEN METABOLIC PANEL: CPT

## 2025-04-15 RX ORDER — ALBUTEROL SULFATE 90 UG/1
4 INHALANT RESPIRATORY (INHALATION) PRN
Status: DISCONTINUED | OUTPATIENT
Start: 2025-04-15 | End: 2025-04-16 | Stop reason: HOSPADM

## 2025-04-15 RX ORDER — ONDANSETRON 2 MG/ML
8 INJECTION INTRAMUSCULAR; INTRAVENOUS ONCE
Status: COMPLETED | OUTPATIENT
Start: 2025-04-15 | End: 2025-04-15

## 2025-04-15 RX ORDER — SODIUM CHLORIDE 9 MG/ML
INJECTION, SOLUTION INTRAVENOUS CONTINUOUS
Status: DISCONTINUED | OUTPATIENT
Start: 2025-04-15 | End: 2025-04-16 | Stop reason: HOSPADM

## 2025-04-15 RX ORDER — ONDANSETRON 2 MG/ML
8 INJECTION INTRAMUSCULAR; INTRAVENOUS
Status: DISCONTINUED | OUTPATIENT
Start: 2025-04-15 | End: 2025-04-16 | Stop reason: HOSPADM

## 2025-04-15 RX ORDER — SODIUM CHLORIDE 0.9 % (FLUSH) 0.9 %
5-40 SYRINGE (ML) INJECTION PRN
Status: DISCONTINUED | OUTPATIENT
Start: 2025-04-15 | End: 2025-04-16 | Stop reason: HOSPADM

## 2025-04-15 RX ORDER — EPINEPHRINE 1 MG/ML
0.3 INJECTION, SOLUTION, CONCENTRATE INTRAVENOUS PRN
Status: DISCONTINUED | OUTPATIENT
Start: 2025-04-15 | End: 2025-04-16 | Stop reason: HOSPADM

## 2025-04-15 RX ORDER — ACETAMINOPHEN 325 MG/1
650 TABLET ORAL
Status: DISCONTINUED | OUTPATIENT
Start: 2025-04-15 | End: 2025-04-16 | Stop reason: HOSPADM

## 2025-04-15 RX ORDER — DIPHENHYDRAMINE HYDROCHLORIDE 50 MG/ML
50 INJECTION, SOLUTION INTRAMUSCULAR; INTRAVENOUS
Status: DISCONTINUED | OUTPATIENT
Start: 2025-04-15 | End: 2025-04-16 | Stop reason: HOSPADM

## 2025-04-15 RX ORDER — HYDROCORTISONE SODIUM SUCCINATE 100 MG/2ML
100 INJECTION INTRAMUSCULAR; INTRAVENOUS
Status: DISCONTINUED | OUTPATIENT
Start: 2025-04-15 | End: 2025-04-16 | Stop reason: HOSPADM

## 2025-04-15 RX ORDER — MEPERIDINE HYDROCHLORIDE 25 MG/ML
12.5 INJECTION INTRAMUSCULAR; INTRAVENOUS; SUBCUTANEOUS PRN
Status: DISCONTINUED | OUTPATIENT
Start: 2025-04-15 | End: 2025-04-16 | Stop reason: HOSPADM

## 2025-04-15 RX ORDER — SODIUM CHLORIDE 9 MG/ML
5-250 INJECTION, SOLUTION INTRAVENOUS PRN
Status: DISCONTINUED | OUTPATIENT
Start: 2025-04-15 | End: 2025-04-16 | Stop reason: HOSPADM

## 2025-04-15 RX ADMIN — ONDANSETRON 8 MG: 2 INJECTION, SOLUTION INTRAMUSCULAR; INTRAVENOUS at 11:39

## 2025-04-15 RX ADMIN — SODIUM CHLORIDE 50 ML/HR: 0.9 INJECTION, SOLUTION INTRAVENOUS at 11:38

## 2025-04-15 RX ADMIN — GEMCITABINE HYDROCHLORIDE 41 MG: 200 INJECTION, POWDER, LYOPHILIZED, FOR SOLUTION INTRAVENOUS at 12:04

## 2025-04-15 RX ADMIN — SODIUM CHLORIDE, PRESERVATIVE FREE 20 ML: 5 INJECTION INTRAVENOUS at 10:35

## 2025-04-15 NOTE — PROGRESS NOTES
Spoke with Dr. Gardiner about this patient. She missed her appointment on Friday 4/11 for Cycle 2 Day 4 of Gemzar (see notes) and is here today for next Gemzar. Per Dr. Gardiner, ok to skip day 4 of cycle and proceed to day 8 as scheduled for today. Day 4 was completed out of treatment plan and advanced to day 8, instead of administering day 4 today.

## 2025-04-16 ENCOUNTER — HOSPITAL ENCOUNTER (OUTPATIENT)
Dept: RADIATION ONCOLOGY | Age: 80
Setting detail: RECURRING SERIES
Discharge: HOME | End: 2025-04-19
Payer: MEDICARE

## 2025-04-16 ENCOUNTER — CLINICAL DOCUMENTATION (OUTPATIENT)
Dept: ONCOLOGY | Age: 80
End: 2025-04-16

## 2025-04-16 LAB
RAD ONC ARIA COURSE FIRST TREATMENT DATE: NORMAL
RAD ONC ARIA COURSE ID: NORMAL
RAD ONC ARIA COURSE INTENT: NORMAL
RAD ONC ARIA COURSE LAST TREATMENT DATE: NORMAL
RAD ONC ARIA COURSE SESSION NUMBER: 22
RAD ONC ARIA COURSE START DATE: NORMAL
RAD ONC ARIA COURSE TREATMENT ELAPSED DAYS: 36
RAD ONC ARIA PLAN FRACTIONS TREATED TO DATE: 22
RAD ONC ARIA PLAN ID: NORMAL
RAD ONC ARIA PLAN PRESCRIBED DOSE PER FRACTION: 1.8 GY
RAD ONC ARIA PLAN PRIMARY REFERENCE POINT: NORMAL
RAD ONC ARIA PLAN TOTAL FRACTIONS PRESCRIBED: 28
RAD ONC ARIA PLAN TOTAL PRESCRIBED DOSE: 5040 CGY
RAD ONC ARIA REFERENCE POINT DOSAGE GIVEN TO DATE: 39.6 GY
RAD ONC ARIA REFERENCE POINT ID: NORMAL
RAD ONC ARIA REFERENCE POINT SESSION DOSAGE GIVEN: 1.8 GY

## 2025-04-16 SDOH — ECONOMIC STABILITY: TRANSPORTATION INSECURITY
IN THE PAST 12 MONTHS, HAS THE LACK OF TRANSPORTATION KEPT YOU FROM MEDICAL APPOINTMENTS OR FROM GETTING MEDICATIONS?: YES

## 2025-04-16 SDOH — ECONOMIC STABILITY: TRANSPORTATION INSECURITY
IN THE PAST 12 MONTHS, HAS LACK OF TRANSPORTATION KEPT YOU FROM MEETINGS, WORK, OR FROM GETTING THINGS NEEDED FOR DAILY LIVING?: YES

## 2025-04-16 NOTE — PROGRESS NOTES
Transportation: Assessment of Need  [facility]    Patient scored at risk for Transportation Needs on SDOH screening in the past year. YES / NO: Yes    Patient has family or caregiver to provide transportation assistance consistently. YES / NO: NO    Patient has scored at risk for financial resource strain and is unable to afford transportation services. YES / NO: No    Insurance Coverage: MISC; INSURANCE COVERAGE: Medicare    Qualifying Insurance for [employer] Transportation: N/A. [place] to arrange roundtrip.  Education on roundtrip complete.    Carlota contacted [employer] to find out pt's transportation benefit . She has no transportation benefit as a part of her plan.  Call reference number [id number]    Transportation Resources*  (Call 211/[employer] if you need more resources.)    Medicaid Van: [employer]   They offer: Non-emergency medical transportation for Medicaid members and some Medicare Advantage plans  Contact: [phone]   Helpful Info: Must call for a ride at least 3 days before your appointment.  Call Monday- Friday 8:00am to 5:00pm. Transportation is available for MD appts, dialysis, x-rays, lab work, drug store, or other medical appointments.     [facility] [employer]  What they offer: Provides assistance and medical transport to adults 60 years and older.  Contact: [phone]    [employer]   What they offer: Charge a fee for transport (not free).  Contact: [phone]    [employer]   They Offer: Charge a fee for transport (not free).  Contact: [phone]

## 2025-04-17 ENCOUNTER — CLINICAL DOCUMENTATION (OUTPATIENT)
Dept: CASE MANAGEMENT | Age: 80
End: 2025-04-17

## 2025-04-17 ENCOUNTER — HOSPITAL ENCOUNTER (OUTPATIENT)
Dept: RADIATION ONCOLOGY | Age: 80
Setting detail: RECURRING SERIES
Discharge: HOME | End: 2025-04-20
Payer: MEDICARE

## 2025-04-17 ENCOUNTER — APPOINTMENT (OUTPATIENT)
Dept: RADIATION ONCOLOGY | Age: 80
End: 2025-04-17
Payer: MEDICARE

## 2025-04-17 ENCOUNTER — CLINICAL DOCUMENTATION (OUTPATIENT)
Dept: ONCOLOGY | Age: 80
End: 2025-04-17

## 2025-04-17 LAB
RAD ONC ARIA COURSE FIRST TREATMENT DATE: NORMAL
RAD ONC ARIA COURSE ID: NORMAL
RAD ONC ARIA COURSE INTENT: NORMAL
RAD ONC ARIA COURSE LAST TREATMENT DATE: NORMAL
RAD ONC ARIA COURSE SESSION NUMBER: 23
RAD ONC ARIA COURSE START DATE: NORMAL
RAD ONC ARIA COURSE TREATMENT ELAPSED DAYS: 37
RAD ONC ARIA PLAN FRACTIONS TREATED TO DATE: 23
RAD ONC ARIA PLAN ID: NORMAL
RAD ONC ARIA PLAN PRESCRIBED DOSE PER FRACTION: 1.8 GY
RAD ONC ARIA PLAN PRIMARY REFERENCE POINT: NORMAL
RAD ONC ARIA PLAN TOTAL FRACTIONS PRESCRIBED: 28
RAD ONC ARIA PLAN TOTAL PRESCRIBED DOSE: 5040 CGY
RAD ONC ARIA REFERENCE POINT DOSAGE GIVEN TO DATE: 41.4 GY
RAD ONC ARIA REFERENCE POINT ID: NORMAL
RAD ONC ARIA REFERENCE POINT SESSION DOSAGE GIVEN: 1.8 GY

## 2025-04-17 PROCEDURE — 77386 HC NTSTY MODUL RAD TX DLVR CPLX: CPT

## 2025-04-17 NOTE — PROGRESS NOTES
Nurse Navigation outreach related to care coordination    Other Spoke with pts daughter in law Livia regarding transportation. Transportation needs provided via email and shared with LEOLA Cabrera.    Navigation Assessment:  The following were identified as potential barriers to care:   1. Transportation insecurity     Interventions and Plan:  Follow up for needs next week.    Goal of next outreach: Transportation needs  Time Spent: 10 minutes

## 2025-04-17 NOTE — PROGRESS NOTES
Transportation Confirmation   Ride(s) arranged for the appointment date(s) below. Patient is scheduled to arrive prior to their first appointment time and will receive a message to coordinate will call transportation back once their visit is complete.      Date(s):4/22, 23 24, & 25   Transportation Service:  roundtrip   Should appointments be added, altered, or canceled please notify  [facility] SW so adjustments can be made or additional transports arranged.   *Please see below for RoundTrip receipt confirmation*

## 2025-04-17 NOTE — PROGRESS NOTES
Arrived to the Infusion Center.  Gemzar completed. Patient tolerated well.   Any issues or concerns during appointment: none.  Patient aware of next infusion appointment on 4/18/25 (date) at 1130 (time).  Discharged via w/c.

## 2025-04-18 ENCOUNTER — HOSPITAL ENCOUNTER (OUTPATIENT)
Dept: RADIATION ONCOLOGY | Age: 80
Setting detail: RECURRING SERIES
Discharge: HOME | End: 2025-04-21
Payer: MEDICARE

## 2025-04-18 ENCOUNTER — HOSPITAL ENCOUNTER (OUTPATIENT)
Dept: INFUSION THERAPY | Age: 80
Setting detail: INFUSION SERIES
Discharge: HOME | End: 2025-04-18
Payer: MEDICARE

## 2025-04-18 ENCOUNTER — HOSPITAL ENCOUNTER (OUTPATIENT)
Dept: LAB | Age: 80
Discharge: HOME | End: 2025-04-18
Payer: MEDICARE

## 2025-04-18 ENCOUNTER — APPOINTMENT (OUTPATIENT)
Dept: RADIATION ONCOLOGY | Age: 80
End: 2025-04-18
Payer: MEDICARE

## 2025-04-18 ENCOUNTER — OFFICE VISIT (OUTPATIENT)
Dept: ONCOLOGY | Age: 80
End: 2025-04-18
Payer: MEDICARE

## 2025-04-18 VITALS
TEMPERATURE: 98.1 F | HEART RATE: 79 BPM | DIASTOLIC BLOOD PRESSURE: 80 MMHG | SYSTOLIC BLOOD PRESSURE: 152 MMHG | WEIGHT: 115.8 LBS | RESPIRATION RATE: 16 BRPM | BODY MASS INDEX: 23.39 KG/M2

## 2025-04-18 DIAGNOSIS — D50.0 IRON DEFICIENCY ANEMIA DUE TO CHRONIC BLOOD LOSS: ICD-10-CM

## 2025-04-18 DIAGNOSIS — C67.9 UROTHELIAL CARCINOMA OF BLADDER WITH INVASION OF MUSCLE (HCC): Primary | ICD-10-CM

## 2025-04-18 DIAGNOSIS — C67.9 UROTHELIAL CARCINOMA OF BLADDER WITH INVASION OF MUSCLE: Primary | ICD-10-CM

## 2025-04-18 LAB
BASOPHILS # BLD: 0.04 K/UL (ref 0–0.2)
BASOPHILS NFR BLD: 0.4 % (ref 0–2)
DIFFERENTIAL METHOD BLD: ABNORMAL
EOSINOPHIL # BLD: 0.14 K/UL (ref 0–0.8)
EOSINOPHIL NFR BLD: 1.3 % (ref 0.5–7.8)
ERYTHROCYTE [DISTWIDTH] IN BLOOD BY AUTOMATED COUNT: 24 % (ref 11.9–14.6)
HCT VFR BLD AUTO: 30.7 % (ref 35.8–46.3)
HGB BLD-MCNC: 9.3 G/DL (ref 11.7–15.4)
IMM GRANULOCYTES # BLD AUTO: 0.29 K/UL (ref 0–0.5)
IMM GRANULOCYTES NFR BLD AUTO: 2.8 % (ref 0–5)
LYMPHOCYTES # BLD: 0.33 K/UL (ref 0.5–4.6)
LYMPHOCYTES NFR BLD: 3.1 % (ref 13–44)
MCH RBC QN AUTO: 27.4 PG (ref 26.1–32.9)
MCHC RBC AUTO-ENTMCNC: 30.3 G/DL (ref 31.4–35)
MCV RBC AUTO: 90.6 FL (ref 82–102)
MONOCYTES # BLD: 0.58 K/UL (ref 0.1–1.3)
MONOCYTES NFR BLD: 5.5 % (ref 4–12)
NEUTS SEG # BLD: 9.1 K/UL (ref 1.7–8.2)
NEUTS SEG NFR BLD: 86.9 % (ref 43–78)
NRBC # BLD: 0 K/UL (ref 0–0.2)
PLATELET # BLD AUTO: 504 K/UL (ref 150–450)
PMV BLD AUTO: 9.7 FL (ref 9.4–12.3)
RAD ONC ARIA COURSE FIRST TREATMENT DATE: NORMAL
RAD ONC ARIA COURSE ID: NORMAL
RAD ONC ARIA COURSE INTENT: NORMAL
RAD ONC ARIA COURSE LAST TREATMENT DATE: NORMAL
RAD ONC ARIA COURSE SESSION NUMBER: 24
RAD ONC ARIA COURSE START DATE: NORMAL
RAD ONC ARIA COURSE TREATMENT ELAPSED DAYS: 38
RAD ONC ARIA PLAN FRACTIONS TREATED TO DATE: 24
RAD ONC ARIA PLAN ID: NORMAL
RAD ONC ARIA PLAN PRESCRIBED DOSE PER FRACTION: 1.8 GY
RAD ONC ARIA PLAN PRIMARY REFERENCE POINT: NORMAL
RAD ONC ARIA PLAN TOTAL FRACTIONS PRESCRIBED: 28
RAD ONC ARIA PLAN TOTAL PRESCRIBED DOSE: 5040 CGY
RAD ONC ARIA REFERENCE POINT DOSAGE GIVEN TO DATE: 43.2 GY
RAD ONC ARIA REFERENCE POINT ID: NORMAL
RAD ONC ARIA REFERENCE POINT SESSION DOSAGE GIVEN: 1.8 GY
RBC # BLD AUTO: 3.39 M/UL (ref 4.05–5.2)
WBC # BLD AUTO: 10.5 K/UL (ref 4.3–11.1)

## 2025-04-18 PROCEDURE — 96375 TX/PRO/DX INJ NEW DRUG ADDON: CPT

## 2025-04-18 PROCEDURE — 2500000003 HC RX 250 WO HCPCS: Performed by: INTERNAL MEDICINE

## 2025-04-18 PROCEDURE — 6360000002 HC RX W HCPCS: Performed by: INTERNAL MEDICINE

## 2025-04-18 PROCEDURE — G8427 DOCREV CUR MEDS BY ELIG CLIN: HCPCS | Performed by: NURSE PRACTITIONER

## 2025-04-18 PROCEDURE — 1090F PRES/ABSN URINE INCON ASSESS: CPT | Performed by: NURSE PRACTITIONER

## 2025-04-18 PROCEDURE — 1159F MED LIST DOCD IN RCRD: CPT | Performed by: NURSE PRACTITIONER

## 2025-04-18 PROCEDURE — 99214 OFFICE O/P EST MOD 30 MIN: CPT | Performed by: NURSE PRACTITIONER

## 2025-04-18 PROCEDURE — G8420 CALC BMI NORM PARAMETERS: HCPCS | Performed by: NURSE PRACTITIONER

## 2025-04-18 PROCEDURE — 1160F RVW MEDS BY RX/DR IN RCRD: CPT | Performed by: NURSE PRACTITIONER

## 2025-04-18 PROCEDURE — 1123F ACP DISCUSS/DSCN MKR DOCD: CPT | Performed by: NURSE PRACTITIONER

## 2025-04-18 PROCEDURE — 96413 CHEMO IV INFUSION 1 HR: CPT

## 2025-04-18 PROCEDURE — 1036F TOBACCO NON-USER: CPT | Performed by: NURSE PRACTITIONER

## 2025-04-18 PROCEDURE — 2580000003 HC RX 258: Performed by: INTERNAL MEDICINE

## 2025-04-18 PROCEDURE — 85025 COMPLETE CBC W/AUTO DIFF WBC: CPT

## 2025-04-18 PROCEDURE — G8399 PT W/DXA RESULTS DOCUMENT: HCPCS | Performed by: NURSE PRACTITIONER

## 2025-04-18 PROCEDURE — 77386 HC NTSTY MODUL RAD TX DLVR CPLX: CPT

## 2025-04-18 RX ORDER — HEPARIN 100 UNIT/ML
500 SYRINGE INTRAVENOUS PRN
Status: DISCONTINUED | OUTPATIENT
Start: 2025-04-18 | End: 2025-04-19 | Stop reason: HOSPADM

## 2025-04-18 RX ORDER — SODIUM CHLORIDE 9 MG/ML
INJECTION, SOLUTION INTRAVENOUS CONTINUOUS
Status: DISCONTINUED | OUTPATIENT
Start: 2025-04-18 | End: 2025-04-19 | Stop reason: HOSPADM

## 2025-04-18 RX ORDER — EPINEPHRINE 1 MG/ML
0.3 INJECTION, SOLUTION, CONCENTRATE INTRAVENOUS PRN
Status: DISCONTINUED | OUTPATIENT
Start: 2025-04-18 | End: 2025-04-19 | Stop reason: HOSPADM

## 2025-04-18 RX ORDER — SODIUM CHLORIDE 0.9 % (FLUSH) 0.9 %
5-40 SYRINGE (ML) INJECTION PRN
Status: DISCONTINUED | OUTPATIENT
Start: 2025-04-18 | End: 2025-04-19 | Stop reason: HOSPADM

## 2025-04-18 RX ORDER — ONDANSETRON 2 MG/ML
8 INJECTION INTRAMUSCULAR; INTRAVENOUS ONCE
Status: COMPLETED | OUTPATIENT
Start: 2025-04-18 | End: 2025-04-18

## 2025-04-18 RX ORDER — ALBUTEROL SULFATE 90 UG/1
4 INHALANT RESPIRATORY (INHALATION) PRN
Status: DISCONTINUED | OUTPATIENT
Start: 2025-04-18 | End: 2025-04-19 | Stop reason: HOSPADM

## 2025-04-18 RX ORDER — HYDROCORTISONE SODIUM SUCCINATE 100 MG/2ML
100 INJECTION INTRAMUSCULAR; INTRAVENOUS
Status: DISCONTINUED | OUTPATIENT
Start: 2025-04-18 | End: 2025-04-19 | Stop reason: HOSPADM

## 2025-04-18 RX ORDER — ACETAMINOPHEN 325 MG/1
650 TABLET ORAL
Status: DISCONTINUED | OUTPATIENT
Start: 2025-04-18 | End: 2025-04-19 | Stop reason: HOSPADM

## 2025-04-18 RX ORDER — SODIUM CHLORIDE 9 MG/ML
5-250 INJECTION, SOLUTION INTRAVENOUS PRN
Status: DISCONTINUED | OUTPATIENT
Start: 2025-04-18 | End: 2025-04-19 | Stop reason: HOSPADM

## 2025-04-18 RX ORDER — MEPERIDINE HYDROCHLORIDE 25 MG/ML
12.5 INJECTION INTRAMUSCULAR; INTRAVENOUS; SUBCUTANEOUS PRN
Status: DISCONTINUED | OUTPATIENT
Start: 2025-04-18 | End: 2025-04-19 | Stop reason: HOSPADM

## 2025-04-18 RX ORDER — DIPHENHYDRAMINE HYDROCHLORIDE 50 MG/ML
50 INJECTION, SOLUTION INTRAMUSCULAR; INTRAVENOUS
Status: DISCONTINUED | OUTPATIENT
Start: 2025-04-18 | End: 2025-04-19 | Stop reason: HOSPADM

## 2025-04-18 RX ORDER — ONDANSETRON 2 MG/ML
8 INJECTION INTRAMUSCULAR; INTRAVENOUS
Status: DISCONTINUED | OUTPATIENT
Start: 2025-04-18 | End: 2025-04-19 | Stop reason: HOSPADM

## 2025-04-18 RX ADMIN — ONDANSETRON 8 MG: 2 INJECTION, SOLUTION INTRAMUSCULAR; INTRAVENOUS at 12:53

## 2025-04-18 RX ADMIN — SODIUM CHLORIDE, PRESERVATIVE FREE 20 ML: 5 INJECTION INTRAVENOUS at 11:35

## 2025-04-18 RX ADMIN — GEMCITABINE HYDROCHLORIDE 41 MG: 200 INJECTION, POWDER, LYOPHILIZED, FOR SOLUTION INTRAVENOUS at 13:04

## 2025-04-18 RX ADMIN — SODIUM CHLORIDE, PRESERVATIVE FREE 10 ML: 5 INJECTION INTRAVENOUS at 12:15

## 2025-04-18 RX ADMIN — SODIUM CHLORIDE 25 ML/HR: 0.9 INJECTION, SOLUTION INTRAVENOUS at 12:18

## 2025-04-18 RX ADMIN — SODIUM CHLORIDE, PRESERVATIVE FREE 10 ML: 5 INJECTION INTRAVENOUS at 13:45

## 2025-04-18 NOTE — PROGRESS NOTES
[facility]  [address] [place] [address]  Dariel Gardiner MD    Patient Name Maria Alejandra Castillo   MRN [id number]   Date 04/18/25     Hematology/Oncology Diagnosis     1. Muscle invasive urothelial carcinoma  2. 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 lady with DM2, anemia, anxiety, COPD, HTN, HLD, smoking history, remote SCC of vulva who presents for follow-up.    - 12/23/24  Cystourethroscopy and large transurethral resection of bladder tumor, Dr. Lacey.  Pathology showed invasive urothelial carcinoma.  -1/5/2025 CT abdomen/pelvis at [facility].  Showed moderate to severe right hydroureteronephrosis with asymmetrically dilated right renal enhancement.  Dilation of right ureter to the level of right UVJ.  Indeterminate right adrenal nodule.  - 1/14/25  Cystoscopy, Dr Lipscomb.  A large amount of white fibrinous material throughout the upper portion of bladder observed, potentially consistent with fungus ball.  TURBT postponed.  Serna replaced.  - 1/15/24  Percutaneous nephrostomy tube placement.   - 1/30/24  TURBT, Dr Lipscomb.  Cystoscopy showed no signs of infection.  Large tumor involving over half of the bladder floor extending across the right trigone and up to the right sidewall observed.  Area of resection about 7 cm.  - 2/26/25  PETCT showed no metastatic disease.  - 3/11/25  Started CMT for disease control.  C1 biweekly gemzar (D1,4 while on XRT)  - 3/18/25  D8 gemzar  - 3/21/25  Missed D11 gemzar    Interval History  She is here today for follow up and C2D11 Gemzar, XRT 24/36.  Overall, she is doing well with treatment.  There are no GI or bowel complaints.  Appetite is fair, eating out of necessity.  Denies any respiratory symptoms, edema, or fevers.  No rash.        ROS   12 point review of system negative except as noted in HPI    Past Medical History:   Diagnosis Date    Anemia     oral Fe--per patient 1/14/25 recent blood

## 2025-04-18 NOTE — PROGRESS NOTES
Arrived to the Infusion Center.  Gemzar completed. Patient tolerated without difficulty.   Any issues or concerns during appointment: seen by Kirsten GUIDRY in infusion for day #11 gemzar.  Ride home arranged after multiple phone calls by this RN and Radha Arroyo charge RN.  Patient aware of next infusion appointment on 04/22 (date) at 1145 (time).  Patient instructed to call provider with temperature of 100.4 or greater or nausea/vomiting/ diarrhea or pain not controlled by medications  Discharged via w/c.

## 2025-04-20 ENCOUNTER — APPOINTMENT (OUTPATIENT)
Dept: CT IMAGING | Age: 80
End: 2025-04-20
Payer: MEDICARE

## 2025-04-20 ENCOUNTER — HOSPITAL ENCOUNTER (EMERGENCY)
Age: 80
Discharge: HOME | End: 2025-04-20
Attending: EMERGENCY MEDICINE
Payer: MEDICARE

## 2025-04-20 VITALS
SYSTOLIC BLOOD PRESSURE: 166 MMHG | BODY MASS INDEX: 23.59 KG/M2 | RESPIRATION RATE: 15 BRPM | WEIGHT: 117 LBS | TEMPERATURE: 98.2 F | DIASTOLIC BLOOD PRESSURE: 75 MMHG | OXYGEN SATURATION: 97 % | HEIGHT: 59 IN | HEART RATE: 91 BPM

## 2025-04-20 DIAGNOSIS — S80.812A ABRASION OF ANTERIOR LEFT LOWER LEG, INITIAL ENCOUNTER: ICD-10-CM

## 2025-04-20 DIAGNOSIS — S00.31XA ABRASION OF NOSE, INITIAL ENCOUNTER: ICD-10-CM

## 2025-04-20 DIAGNOSIS — S80.211A ABRASION OF RIGHT KNEE, INITIAL ENCOUNTER: ICD-10-CM
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
## 2025-04-20 PROCEDURE — 72125 CT NECK SPINE W/O DYE: CPT

## 2025-04-20 PROCEDURE — 99284 EMERGENCY DEPT VISIT MOD MDM: CPT

## 2025-04-20 PROCEDURE — 70450 CT HEAD/BRAIN W/O DYE: CPT

## 2025-04-20 ASSESSMENT — LIFESTYLE VARIABLES
HOW MANY STANDARD DRINKS CONTAINING ALCOHOL DO YOU HAVE ON A TYPICAL DAY: PATIENT DOES NOT DRINK
HOW OFTEN DO YOU HAVE A DRINK CONTAINING ALCOHOL: NEVER

## 2025-04-20 ASSESSMENT — PAIN SCALES - GENERAL
PAINLEVEL_OUTOF10: 0
PAINLEVEL_OUTOF10: 0

## 2025-04-20 ASSESSMENT — PAIN - FUNCTIONAL ASSESSMENT: PAIN_FUNCTIONAL_ASSESSMENT: 0-10

## 2025-04-20 NOTE — ED PROVIDER NOTES
GUIDED NEPHROSTOMY CATH PLACEMENT RIGHT 1/15/2025 [facility] RADIOLOGY SPECIALS    IR GUIDED URETERAL STENT PLACE THRU EXIST TRACT  2025    IR GUIDED URETERAL STENT PLACE THRU EXIST TRACT 2025 [facility] RADIOLOGY SPECIALS    IR PORT PLACEMENT > 5 YEARS  3/3/2025    IR PORT PLACEMENT > 5 YEARS 3/3/2025 [facility] RADIOLOGY SPECIALS    SALPINGO-OOPHORECTOMY Right age 29    SALPINGO-OOPHORECTOMY Left age 19        Social History     Socioeconomic History    Marital status:    Tobacco Use    Smoking status: Former     Current packs/day: 0.00     Average packs/day: 1 pack/day for 60.1 years (60.1 ttl pk-yrs)     Types: Cigarettes     Start date:      Quit date: 2025     Years since quittin.2    Smokeless tobacco: Never    Tobacco comments:     2 cigarettes a day, trying to quit using nicotine patches   Vaping Use    Vaping status: Never Used   Substance and Sexual Activity    Alcohol use: No     Alcohol/week: 0.0 standard drinks of alcohol    Drug use: No    Sexual activity: Defer     Social Drivers of Health     Financial Resource Strain: Low Risk  (3/12/2025)    Received from [employer]    Financial Resource Strain     Difficulty Paying Living Expenses: Not hard at all     Difficulty Paying Medical Expenses: No   Food Insecurity: No Food Insecurity (3/12/2025)    Received from [employer]    Food Insecurity     Worried about Running Out of Food in the Last Year: Never true     Ran Out of Food in the Last Year: Never true   Transportation Needs: Unmet Transportation Needs (2025)    PRAPARE - Transportation     Lack of Transportation (Medical): Yes     Lack of Transportation (Non-Medical): Yes   Physical Activity: Inactive (2025)    Received from [employer]    Physical Activity     Days of Exercise per Week: 0 days     On average, how many minutes do you exercise per day?: 0     Total Minutes of Exercise Per Week: 0   Stress: Stress Concern Present (3/12/2025)    Received from [employer]

## 2025-04-20 NOTE — ED NOTES
Patient mobility status  wheelchair bound.     I have reviewed discharge instructions with the patient.  The patient verbalized understanding.    Patient left ED via Discharge Method: wheelchair to Home with family.    Opportunity for questions and clarification provided.     Patient given 0 scripts.            Cherelle Maciel RN  04/20/25 1934

## 2025-04-20 NOTE — ED TRIAGE NOTES
PT had a trip & fall at home, hit head, no LOC, no thinners. PT has a abrasion to L ankle & R knee. Pt knocked her L front incisor loose. Pt felt lightly nausous in route but resolved.

## 2025-04-20 NOTE — DISCHARGE INSTRUCTIONS
Keep your wounds clean with soap and water and apply an antibacterial ointment and a clean bandage twice daily.    Please return with any vomiting, confusion, worsening symptoms, or additional concerns.

## 2025-04-21 ENCOUNTER — APPOINTMENT (OUTPATIENT)
Dept: RADIATION ONCOLOGY | Age: 80
End: 2025-04-21
Payer: MEDICARE

## 2025-04-22 ENCOUNTER — CLINICAL DOCUMENTATION (OUTPATIENT)
Dept: CASE MANAGEMENT | Age: 80
End: 2025-04-22

## 2025-04-22 ENCOUNTER — HOSPITAL ENCOUNTER (OUTPATIENT)
Dept: INFUSION THERAPY | Age: 80
Setting detail: INFUSION SERIES
End: 2025-04-22

## 2025-04-23 ENCOUNTER — TELEPHONE (OUTPATIENT)
Dept: RADIATION ONCOLOGY | Age: 80
End: 2025-04-23

## 2025-04-23 ENCOUNTER — HOSPITAL ENCOUNTER (OUTPATIENT)
Dept: LAB | Age: 80
Discharge: HOME | End: 2025-04-23
Payer: MEDICARE

## 2025-04-23 ENCOUNTER — APPOINTMENT (OUTPATIENT)
Dept: RADIATION ONCOLOGY | Age: 80
End: 2025-04-23
Payer: MEDICARE

## 2025-04-23 DIAGNOSIS — C67.9 UROTHELIAL CARCINOMA OF BLADDER WITH INVASION OF MUSCLE (HCC): Primary | ICD-10-CM

## 2025-04-23 DIAGNOSIS — C67.9 UROTHELIAL CARCINOMA OF BLADDER WITH INVASION OF MUSCLE (HCC): ICD-10-CM

## 2025-04-23 LAB
ALBUMIN SERPL-MCNC: 2.1 G/DL (ref 3.2–4.6)
ALBUMIN/GLOB SERPL: 0.4 (ref 1–1.9)
ALP SERPL-CCNC: 115 U/L (ref 35–104)
ALT SERPL-CCNC: 7 U/L (ref 8–45)
ANION GAP SERPL CALC-SCNC: 15 MMOL/L (ref 7–16)
APPEARANCE UR: ABNORMAL
AST SERPL-CCNC: 21 U/L (ref 15–37)
BACTERIA URNS QL MICRO: ABNORMAL /HPF
BASOPHILS # BLD: 0.05 K/UL (ref 0–0.2)
BASOPHILS NFR BLD: 0.7 % (ref 0–2)
BILIRUB SERPL-MCNC: 0.3 MG/DL (ref 0–1.2)
BILIRUB UR QL: NEGATIVE
BUN SERPL-MCNC: 18 MG/DL (ref 8–23)
CALCIUM SERPL-MCNC: 9.1 MG/DL (ref 8.8–10.2)
CHLORIDE SERPL-SCNC: 92 MMOL/L (ref 98–107)
CO2 SERPL-SCNC: 25 MMOL/L (ref 20–29)
COLOR UR: YELLOW
CREAT SERPL-MCNC: 1.08 MG/DL (ref 0.6–1.1)
DIFFERENTIAL METHOD BLD: ABNORMAL
EOSINOPHIL # BLD: 0.03 K/UL (ref 0–0.8)
EOSINOPHIL NFR BLD: 0.4 % (ref 0.5–7.8)
EPI CELLS #/AREA URNS HPF: ABNORMAL /HPF
ERYTHROCYTE [DISTWIDTH] IN BLOOD BY AUTOMATED COUNT: 24.6 % (ref 11.9–14.6)
GLOBULIN SER CALC-MCNC: 4.8 G/DL (ref 2.3–3.5)
GLUCOSE SERPL-MCNC: 325 MG/DL (ref 70–99)
GLUCOSE UR STRIP.AUTO-MCNC: NEGATIVE MG/DL
HCT VFR BLD AUTO: 31.8 % (ref 35.8–46.3)
HGB BLD-MCNC: 9.7 G/DL (ref 11.7–15.4)
HGB UR QL STRIP: ABNORMAL
IMM GRANULOCYTES # BLD AUTO: 0.45 K/UL (ref 0–0.5)
IMM GRANULOCYTES NFR BLD AUTO: 6.2 % (ref 0–5)
KETONES UR QL STRIP.AUTO: ABNORMAL MG/DL
LEUKOCYTE ESTERASE UR QL STRIP.AUTO: ABNORMAL
LYMPHOCYTES # BLD: 0.13 K/UL (ref 0.5–4.6)
LYMPHOCYTES NFR BLD: 1.8 % (ref 13–44)
MCH RBC QN AUTO: 28 PG (ref 26.1–32.9)
MCHC RBC AUTO-ENTMCNC: 30.5 G/DL (ref 31.4–35)
MCV RBC AUTO: 91.6 FL (ref 82–102)
MONOCYTES # BLD: 0.63 K/UL (ref 0.1–1.3)
MONOCYTES NFR BLD: 8.6 % (ref 4–12)
MUCOUS THREADS URNS QL MICRO: 0 /LPF
NEUTS SEG # BLD: 6.01 K/UL (ref 1.7–8.2)
NEUTS SEG NFR BLD: 82.3 % (ref 43–78)
NITRITE UR QL STRIP.AUTO: POSITIVE
NRBC # BLD: 0.02 K/UL (ref 0–0.2)
PH UR STRIP: 6.5 (ref 5–9)
PLATELET # BLD AUTO: 389 K/UL (ref 150–450)
PLATELET COMMENT: ADEQUATE
PMV BLD AUTO: 9.6 FL (ref 9.4–12.3)
POTASSIUM SERPL-SCNC: 3.3 MMOL/L (ref 3.5–5.1)
PROT SERPL-MCNC: 6.8 G/DL (ref 6.3–8.2)
PROT UR STRIP-MCNC: >300 MG/DL
RBC # BLD AUTO: 3.47 M/UL (ref 4.05–5.2)
RBC #/AREA URNS HPF: ABNORMAL /HPF
RBC MORPH BLD: ABNORMAL
SODIUM SERPL-SCNC: 132 MMOL/L (ref 136–145)
SP GR UR REFRACTOMETRY: 1.02 (ref 1–1.02)
URINE CULTURE IF INDICATED: ABNORMAL
UROBILINOGEN UR QL STRIP.AUTO: 0.2 EU/DL (ref 0.2–1)
WBC # BLD AUTO: 7.3 K/UL (ref 4.3–11.1)
WBC MORPH BLD: ABNORMAL
WBC URNS QL MICRO: >100 /HPF

## 2025-04-23 PROCEDURE — 36415 COLL VENOUS BLD VENIPUNCTURE: CPT

## 2025-04-23 PROCEDURE — 87086 URINE CULTURE/COLONY COUNT: CPT

## 2025-04-23 PROCEDURE — 81001 URINALYSIS AUTO W/SCOPE: CPT

## 2025-04-23 PROCEDURE — 85025 COMPLETE CBC W/AUTO DIFF WBC: CPT

## 2025-04-23 PROCEDURE — 80053 COMPREHEN METABOLIC PANEL: CPT

## 2025-04-23 RX ORDER — SULFAMETHOXAZOLE AND TRIMETHOPRIM 800; 160 MG/1; MG/1
1 TABLET ORAL 2 TIMES DAILY
Qty: 20 TABLET | Refills: 0 | Status: SHIPPED | OUTPATIENT
Start: 2025-04-23 | End: 2025-05-03

## 2025-04-23 NOTE — PROGRESS NOTES
Nurse Navigation outreach related to care coordination    Other Received message from pts delmi in law, Livia reporting pt fell on 4/20/25 and would not be able to make it to her appts. Livia expressed concerns regarding pts weakness (reports she was unable to get out of bed this morning), and states that she seems confused. Advised she should be seen in the ER to determine the cause of her symptoms. Livia informed her  about having pt seen in the ER. Received a call from pts son Zev and he reported that pt was now up and walking independently and does not seem confused at this time. Spoke with Dr. Kennedy and he was in agreement to see pt in AM during time of her usual radiation treatment. Advised that if there are any worsening of symptoms overnight she should be seen in the ER.    Navigation Assessment:  The following were identified as potential barriers to care:   1. Transportation insecurity   2. Goals of care     Interventions and Plan:  Pt will come and see Dr. Kennedy on 4/23/25 for evaluation and labs.    Goal of next outreach: Meet at visit with Dr. Kennedy.  Time Spent: 90 minutes including phone calls and coordination of care

## 2025-04-23 NOTE — TELEPHONE ENCOUNTER
Called patient per md with results from UA.  Let son know that patient does have a UTI.  Dr. Kennedy called in ABX for patient.  She should take the medicine as directed to completion.  MD will keep an eye on the sensitivities and change to something else if needed.  Son expresses understanding and is appreciative.  Knows to call with any questions.

## 2025-04-24 ENCOUNTER — APPOINTMENT (OUTPATIENT)
Dept: RADIATION ONCOLOGY | Age: 80
End: 2025-04-24
Payer: MEDICARE

## 2025-04-25 ENCOUNTER — CLINICAL DOCUMENTATION (OUTPATIENT)
Dept: CASE MANAGEMENT | Age: 80
End: 2025-04-25

## 2025-04-25 LAB
BACTERIA SPEC CULT: NORMAL
SERVICE CMNT-IMP: NORMAL

## 2025-04-25 NOTE — PROGRESS NOTES
Nurse Navigation outreach related to care coordination    Other Called and spoke with pts son Zev regarding plan for next week. Pt is unsure whether she wants to continue with radiation or not. Family will call on Monday morning with decision.    Navigation Assessment:  The following were identified as potential barriers to care:   1. Transportation insecurity     Interventions and Plan:  As above.    Goal of next outreach: Assess plan of care.  Time Spent: 10 minutes

## 2025-04-28 ENCOUNTER — APPOINTMENT (OUTPATIENT)
Dept: RADIATION ONCOLOGY | Age: 80
End: 2025-04-28
Payer: MEDICARE

## 2025-04-28 ENCOUNTER — CLINICAL DOCUMENTATION (OUTPATIENT)
Dept: CASE MANAGEMENT | Age: 80
End: 2025-04-28

## 2025-04-28 NOTE — PROGRESS NOTES
Nurse Navigation outreach related to care coordination    Other Spoke with pts son and daughter in law. Pt would like to come for treatment this week but will need assistance with transportation. Message sent to LEOLA Cabrera regarding rides.    Navigation Assessment:  The following were identified as potential barriers to care:   1. Transportation insecurity     Interventions and Plan:  Message sent to  for transportation assistance.    Goal of next outreach: Assess transportation needs for next week.  Time Spent: 15 minutes

## 2025-04-29 ENCOUNTER — HOSPITAL ENCOUNTER (OUTPATIENT)
Dept: RADIATION ONCOLOGY | Age: 80
Setting detail: RECURRING SERIES
Discharge: HOME | End: 2025-05-02
Payer: MEDICARE

## 2025-04-29 ENCOUNTER — CLINICAL DOCUMENTATION (OUTPATIENT)
Dept: ONCOLOGY | Age: 80
End: 2025-04-29

## 2025-04-29 ENCOUNTER — APPOINTMENT (OUTPATIENT)
Dept: RADIATION ONCOLOGY | Age: 80
End: 2025-04-29
Payer: MEDICARE

## 2025-04-29 LAB
RAD ONC ARIA COURSE FIRST TREATMENT DATE: NORMAL
RAD ONC ARIA COURSE ID: NORMAL
RAD ONC ARIA COURSE INTENT: NORMAL
RAD ONC ARIA COURSE LAST TREATMENT DATE: NORMAL
RAD ONC ARIA COURSE SESSION NUMBER: 25
RAD ONC ARIA COURSE START DATE: NORMAL
RAD ONC ARIA COURSE TREATMENT ELAPSED DAYS: 49
RAD ONC ARIA PLAN FRACTIONS TREATED TO DATE: 25
RAD ONC ARIA PLAN ID: NORMAL
RAD ONC ARIA PLAN PRESCRIBED DOSE PER FRACTION: 1.8 GY
RAD ONC ARIA PLAN PRIMARY REFERENCE POINT: NORMAL
RAD ONC ARIA PLAN TOTAL FRACTIONS PRESCRIBED: 28
RAD ONC ARIA PLAN TOTAL PRESCRIBED DOSE: 5040 CGY
RAD ONC ARIA REFERENCE POINT DOSAGE GIVEN TO DATE: 45 GY
RAD ONC ARIA REFERENCE POINT ID: NORMAL
RAD ONC ARIA REFERENCE POINT SESSION DOSAGE GIVEN: 1.8 GY

## 2025-04-29 PROCEDURE — 77386 HC NTSTY MODUL RAD TX DLVR CPLX: CPT

## 2025-04-29 PROCEDURE — 77336 RADIATION PHYSICS CONSULT: CPT

## 2025-04-29 NOTE — ON TREATMENT VISIT
[facility] RADIATION ONCOLOGY ON TREATMENT VISIT    Patient: Maria Alejandra Castillo MRN: [id number]  SSN: [id number]    YOB: 1945  Age: 79 y.o.  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 female who is currently receiving trimodality therapy for MIBC.    Current RT dose: 45/64.8 Gy in 25/36 fractions.     Concurrent systemic therapy: Gemzar    Subjective:  Week 1: No complaints apart from some nausea after her chemotherapy.  Week 2: Had a fall on Thursday evening but did not hit her head. She says she fell on her left side and hit her dresser on the lower left flank. This area is sore but has no other complaints.   Week 3: No complaints.  Week 4: No complaints.   Week 5: Burning with urination.  Week 6: Increased urinary frequency but no other complaints.  Week 7: Missed a week of treatment due to complaints of urinary discomfort.  She also had some confusion during this time and had a fall at home.  She presented to the emergency department, and only underwent head CT which was normal.  No other workup was done at that time.  I saw her midweek ordered urinalysis as well as empiric Bactrim for presumed urinary tract infection.  Her UA did come back showing a urinary tract infection.  She continues on Bactrim at this time, and she and her family both state that her confusion has improved and her urinary symptoms are improved as well.  She will continue on with radiation for now.  She was encouraged to try to not miss any more days of treatment if at all possible.      Objective:  There were no vitals filed for this visit.      Pain 0/10    General: Alert and conversant, in NAD  Skin: no

## 2025-04-29 NOTE — PROGRESS NOTES
Transportation Confirmation   Ride(s) arranged for the appointment date(s) below. Patient is scheduled to arrive prior to their first appointment time and will receive a message to coordinate will call transportation back once their visit is complete.      Date(s):4/29,30 and May 1   Transportation Service:    Should appointments be added, altered, or canceled please notify  [facility] SW so adjustments can be made or additional transports arranged.   *Please see below for RoundTrip receipt confirmation*

## 2025-04-30 ENCOUNTER — APPOINTMENT (OUTPATIENT)
Dept: RADIATION ONCOLOGY | Age: 80
End: 2025-04-30
Payer: MEDICARE

## 2025-05-01 ENCOUNTER — CLINICAL DOCUMENTATION (OUTPATIENT)
Dept: CASE MANAGEMENT | Age: 80
End: 2025-05-01

## 2025-05-01 DIAGNOSIS — C67.9 UROTHELIAL CARCINOMA OF BLADDER WITH INVASION OF MUSCLE (HCC): Primary | ICD-10-CM

## 2025-05-01 NOTE — PROGRESS NOTES
Nurse Navigation outreach related to care coordination    Other Received message from pts daughter in law Livia reporting that pt did not come for radiation yesterday and would like to cancel for the rest of the week. Spoke with pts son Zev and he would like to come with pt for an appt to discuss goals of care. Message sent to Dr. Kennedy and Dr. Gardiner.    Navigation Assessment:  The following were identified as potential barriers to care:   1. Transportation insecurity   2. Treatment noncompliance     Interventions and Plan:  As above.    Referrals placed: Palliative Care [[id number]]  Goal of next outreach: Plan of care  Time Spent: 30 minutes, including care coordination

## 2025-05-02 DIAGNOSIS — C67.9 UROTHELIAL CARCINOMA OF BLADDER WITH INVASION OF MUSCLE (HCC): Primary | ICD-10-CM

## 2025-05-02 DIAGNOSIS — D50.0 IRON DEFICIENCY ANEMIA DUE TO CHRONIC BLOOD LOSS: ICD-10-CM

## 2025-05-05 ENCOUNTER — CLINICAL DOCUMENTATION (OUTPATIENT)
Dept: ONCOLOGY | Age: 80
End: 2025-05-05

## 2025-05-05 NOTE — PROGRESS NOTES
Nurse Navigation outreach related to care coordination    Other Communicated with Livia, pts daughter in law, regarding radiation appts scheduled for this week. Pt is scheduled to see Dr. Gardiner and palliative care on 5/7/25 and does not want to continue any treatment until she has that office visit. Dr. Kennedy and radiation oncology team aware.    Navigation Assessment:  The following were identified as potential barriers to care:   1. Transportation insecurity   2. Treatment noncompliance     Interventions and Plan:  As above    Goal of next outreach: Office visit with Dr. Gardiner  Time Spent: 10 minutes

## 2025-05-07 ENCOUNTER — HOSPITAL ENCOUNTER (OUTPATIENT)
Dept: LAB | Age: 80
Discharge: HOME | End: 2025-05-07
Payer: MEDICARE

## 2025-05-07 ENCOUNTER — TELEPHONE (OUTPATIENT)
Dept: ONCOLOGY | Age: 80
End: 2025-05-07

## 2025-05-07 ENCOUNTER — OFFICE VISIT (OUTPATIENT)
Dept: ONCOLOGY | Age: 80
End: 2025-05-07
Payer: MEDICARE

## 2025-05-07 ENCOUNTER — OFFICE VISIT (OUTPATIENT)
Dept: PALLATIVE CARE | Age: 80
End: 2025-05-07
Payer: MEDICARE

## 2025-05-07 ENCOUNTER — CLINICAL DOCUMENTATION (OUTPATIENT)
Dept: CASE MANAGEMENT | Age: 80
End: 2025-05-07

## 2025-05-07 VITALS
HEART RATE: 93 BPM | BODY MASS INDEX: 21.77 KG/M2 | WEIGHT: 108 LBS | DIASTOLIC BLOOD PRESSURE: 68 MMHG | OXYGEN SATURATION: 96 % | SYSTOLIC BLOOD PRESSURE: 130 MMHG | RESPIRATION RATE: 18 BRPM | HEIGHT: 59 IN | TEMPERATURE: 97.7 F

## 2025-05-07 DIAGNOSIS — C67.9 UROTHELIAL CARCINOMA OF BLADDER WITH INVASION OF MUSCLE (HCC): ICD-10-CM

## 2025-05-07 DIAGNOSIS — Z51.5 ENCOUNTER FOR PALLIATIVE CARE: ICD-10-CM

## 2025-05-07 DIAGNOSIS — D50.0 IRON DEFICIENCY ANEMIA DUE TO CHRONIC BLOOD LOSS: ICD-10-CM

## 2025-05-07 DIAGNOSIS — C67.9 UROTHELIAL CARCINOMA OF BLADDER WITH INVASION OF MUSCLE (HCC): Primary | ICD-10-CM

## 2025-05-07 DIAGNOSIS — R53.0 NEOPLASTIC MALIGNANT RELATED FATIGUE: Primary | ICD-10-CM

## 2025-05-07 LAB
ALBUMIN SERPL-MCNC: 2.9 G/DL (ref 3.2–4.6)
ALBUMIN/GLOB SERPL: 0.7 (ref 1–1.9)
ALP SERPL-CCNC: 133 U/L (ref 35–104)
ALT SERPL-CCNC: 17 U/L (ref 8–45)
ANION GAP SERPL CALC-SCNC: 14 MMOL/L (ref 7–16)
AST SERPL-CCNC: 21 U/L (ref 15–37)
BASOPHILS # BLD: 0.06 K/UL (ref 0–0.2)
BASOPHILS NFR BLD: 0.5 % (ref 0–2)
BILIRUB SERPL-MCNC: 0.4 MG/DL (ref 0–1.2)
BUN SERPL-MCNC: 25 MG/DL (ref 8–23)
CALCIUM SERPL-MCNC: 9.6 MG/DL (ref 8.8–10.2)
CHLORIDE SERPL-SCNC: 96 MMOL/L (ref 98–107)
CO2 SERPL-SCNC: 23 MMOL/L (ref 20–29)
CREAT SERPL-MCNC: 1.42 MG/DL (ref 0.6–1.1)
DIFFERENTIAL METHOD BLD: ABNORMAL
EOSINOPHIL # BLD: 0.05 K/UL (ref 0–0.8)
EOSINOPHIL NFR BLD: 0.4 % (ref 0.5–7.8)
ERYTHROCYTE [DISTWIDTH] IN BLOOD BY AUTOMATED COUNT: 23.9 % (ref 11.9–14.6)
GLOBULIN SER CALC-MCNC: 4.4 G/DL (ref 2.3–3.5)
GLUCOSE SERPL-MCNC: 432 MG/DL (ref 70–99)
HCT VFR BLD AUTO: 36 % (ref 35.8–46.3)
HGB BLD-MCNC: 11 G/DL (ref 11.7–15.4)
IMM GRANULOCYTES # BLD AUTO: 0.21 K/UL (ref 0–0.5)
IMM GRANULOCYTES NFR BLD AUTO: 1.7 % (ref 0–5)
LYMPHOCYTES # BLD: 0.5 K/UL (ref 0.5–4.6)
LYMPHOCYTES NFR BLD: 4 % (ref 13–44)
MCH RBC QN AUTO: 28.3 PG (ref 26.1–32.9)
MCHC RBC AUTO-ENTMCNC: 30.6 G/DL (ref 31.4–35)
MCV RBC AUTO: 92.5 FL (ref 82–102)
MONOCYTES # BLD: 1.1 K/UL (ref 0.1–1.3)
MONOCYTES NFR BLD: 8.9 % (ref 4–12)
NEUTS SEG # BLD: 10.5 K/UL (ref 1.7–8.2)
NEUTS SEG NFR BLD: 84.5 % (ref 43–78)
NRBC # BLD: 0 K/UL (ref 0–0.2)
PLATELET # BLD AUTO: 438 K/UL (ref 150–450)
PMV BLD AUTO: 9.9 FL (ref 9.4–12.3)
POTASSIUM SERPL-SCNC: 4.7 MMOL/L (ref 3.5–5.1)
PROT SERPL-MCNC: 7.3 G/DL (ref 6.3–8.2)
RBC # BLD AUTO: 3.89 M/UL (ref 4.05–5.2)
SODIUM SERPL-SCNC: 133 MMOL/L (ref 136–145)
WBC # BLD AUTO: 12.4 K/UL (ref 4.3–11.1)

## 2025-05-07 PROCEDURE — 1123F ACP DISCUSS/DSCN MKR DOCD: CPT | Performed by: INTERNAL MEDICINE

## 2025-05-07 PROCEDURE — 1036F TOBACCO NON-USER: CPT | Performed by: PHYSICIAN ASSISTANT

## 2025-05-07 PROCEDURE — 1159F MED LIST DOCD IN RCRD: CPT | Performed by: INTERNAL MEDICINE

## 2025-05-07 PROCEDURE — 1123F ACP DISCUSS/DSCN MKR DOCD: CPT | Performed by: PHYSICIAN ASSISTANT

## 2025-05-07 PROCEDURE — 99202 OFFICE O/P NEW SF 15 MIN: CPT | Performed by: PHYSICIAN ASSISTANT

## 2025-05-07 PROCEDURE — 1090F PRES/ABSN URINE INCON ASSESS: CPT | Performed by: INTERNAL MEDICINE

## 2025-05-07 PROCEDURE — 80053 COMPREHEN METABOLIC PANEL: CPT

## 2025-05-07 PROCEDURE — 3078F DIAST BP <80 MM HG: CPT | Performed by: INTERNAL MEDICINE

## 2025-05-07 PROCEDURE — 99214 OFFICE O/P EST MOD 30 MIN: CPT | Performed by: INTERNAL MEDICINE

## 2025-05-07 PROCEDURE — G8399 PT W/DXA RESULTS DOCUMENT: HCPCS | Performed by: PHYSICIAN ASSISTANT

## 2025-05-07 PROCEDURE — 85025 COMPLETE CBC W/AUTO DIFF WBC: CPT

## 2025-05-07 PROCEDURE — G8399 PT W/DXA RESULTS DOCUMENT: HCPCS | Performed by: INTERNAL MEDICINE

## 2025-05-07 PROCEDURE — G8428 CUR MEDS NOT DOCUMENT: HCPCS | Performed by: PHYSICIAN ASSISTANT

## 2025-05-07 PROCEDURE — 36415 COLL VENOUS BLD VENIPUNCTURE: CPT

## 2025-05-07 PROCEDURE — G8420 CALC BMI NORM PARAMETERS: HCPCS | Performed by: INTERNAL MEDICINE

## 2025-05-07 PROCEDURE — G8427 DOCREV CUR MEDS BY ELIG CLIN: HCPCS | Performed by: INTERNAL MEDICINE

## 2025-05-07 PROCEDURE — 1159F MED LIST DOCD IN RCRD: CPT | Performed by: PHYSICIAN ASSISTANT

## 2025-05-07 PROCEDURE — G8420 CALC BMI NORM PARAMETERS: HCPCS | Performed by: PHYSICIAN ASSISTANT

## 2025-05-07 PROCEDURE — 1090F PRES/ABSN URINE INCON ASSESS: CPT | Performed by: PHYSICIAN ASSISTANT

## 2025-05-07 PROCEDURE — 3075F SYST BP GE 130 - 139MM HG: CPT | Performed by: INTERNAL MEDICINE

## 2025-05-07 PROCEDURE — 1160F RVW MEDS BY RX/DR IN RCRD: CPT | Performed by: INTERNAL MEDICINE

## 2025-05-07 PROCEDURE — 1126F AMNT PAIN NOTED NONE PRSNT: CPT | Performed by: INTERNAL MEDICINE

## 2025-05-07 PROCEDURE — G2211 COMPLEX E/M VISIT ADD ON: HCPCS | Performed by: INTERNAL MEDICINE

## 2025-05-07 PROCEDURE — 1036F TOBACCO NON-USER: CPT | Performed by: INTERNAL MEDICINE

## 2025-05-07 NOTE — PROGRESS NOTES
[facility]  [address] [place] [address]  Dariel Gardiner MD    Patient Name Maria Alejandra Castillo   MRN [id number]   Date 05/07/25     Hematology/Oncology Diagnosis     1. Muscle invasive urothelial carcinoma  2. 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 lady with DM2, anemia, anxiety, COPD, HTN, HLD, smoking history, remote SCC of vulva who presents for follow-up.    - 12/23/24  Cystourethroscopy and large transurethral resection of bladder tumor, Dr. Lacey.  Pathology showed invasive urothelial carcinoma.  -1/5/2025 CT abdomen/pelvis at [facility].  Showed moderate to severe right hydroureteronephrosis with asymmetrically dilated right renal enhancement.  Dilation of right ureter to the level of right UVJ.  Indeterminate right adrenal nodule.  - 1/14/25  Cystoscopy, Dr Lipscomb.  A large amount of white fibrinous material throughout the upper portion of bladder observed, potentially consistent with fungus ball.  TURBT postponed.  Serna replaced.  - 1/15/24  Percutaneous nephrostomy tube placement.   - 1/30/24  TURBT, Dr Lipscomb.  Cystoscopy showed no signs of infection.  Large tumor involving over half of the bladder floor extending across the right trigone and up to the right sidewall observed.  Area of resection about 7 cm.  - 2/26/25  PETCT showed no metastatic disease.  - 3/11/25  Started CMT for disease control.  C1 biweekly gemzar (D1,4 while on XRT)  - 3/18/25  D8 gemzar  - 3/21/25  Missed D11 gemzar    Interval History  Presents for scheduled follow-up with Zev.  In a wheelchair today.  Has missed multiple sessions of radiation.  Has about 11 radiation treatments to make up, however, she does not want to complete radiation as scheduled given difficulties with logistics and overall PFS.    ROS   12 point review of system negative except as noted in HPI    Past Medical History:   Diagnosis Date    Anemia     oral Fe--per patient

## 2025-05-07 NOTE — PATIENT INSTRUCTIONS
Patient Information from Today's Visit    The members of your Oncology Medical Home are listed below:    Physician Provider: Rene Kennedy, Radiation Oncologist and Dr. Dariel Gardiner, Medical Oncologist  Advanced Practice Clinician: N/A  Registered Nurse: Cary ARGUETA RN  Nurse Navigator: Saba HANDY RN  Medical Assistant: Rell VALLADARES MA  : Brandi ART  Supportive Care Services: Rick PINEDA LMSW    Diagnosis (Information Sheet Provided on Day of Diagnosis): bladder cancer    Follow Up Instructions: 3 months     Will hold off on treatment for now    Has Treatment Plan Been Finalized?  Done with chemo!    Current Labs:   Hospital Outpatient Visit on 05/07/2025   Component Date Value Ref Range Status    WBC 05/07/2025 12.4 (H)  4.3 - 11.1 K/uL Final    RBC 05/07/2025 3.89 (L)  4.05 - 5.2 M/uL Final    Hemoglobin 05/07/2025 11.0 (L)  11.7 - 15.4 g/dL Final    Hematocrit 05/07/2025 36.0  35.8 - 46.3 % Final    MCV 05/07/2025 92.5  82.0 - 102.0 FL Final    MCH 05/07/2025 28.3  26.1 - 32.9 PG Final    MCHC 05/07/2025 30.6 (L)  31.4 - 35.0 g/dL Final    RDW 05/07/2025 23.9 (H)  11.9 - 14.6 % Final    Platelets 05/07/2025 438  150 - 450 K/uL Final    MPV 05/07/2025 9.9  9.4 - 12.3 FL Final    nRBC 05/07/2025 0.00  0.0 - 0.2 K/uL Final    **Note: Absolute NRBC parameter is now reported with Hemogram**    Neutrophils % 05/07/2025 84.5 (H)  43.0 - 78.0 % Final    Lymphocytes % 05/07/2025 4.0 (L)  13.0 - 44.0 % Final    Monocytes % 05/07/2025 8.9  4.0 - 12.0 % Final    Eosinophils % 05/07/2025 0.4 (L)  0.5 - 7.8 % Final    Basophils % 05/07/2025 0.5  0.0 - 2.0 % Final    Immature Granulocytes % 05/07/2025 1.7  0.0 - 5.0 % Final    Neutrophils Absolute 05/07/2025 10.50 (H)  1.70 - 8.20 K/UL Final    Lymphocytes Absolute 05/07/2025 0.50  0.50 - 4.60 K/UL Final    Monocytes Absolute 05/07/2025 1.10  0.10 - 1.30 K/UL Final    Eosinophils Absolute 05/07/2025 0.05  0.00 - 0.80 K/UL Final    Basophils Absolute 05/07/2025 0.06  0.00

## 2025-05-07 NOTE — PROGRESS NOTES
Nurse Navigation outreach related to care coordination    Other Met with pt while present with her son, Zev for visit with Dr. Gardiner and SRIKANTH Mccoy. Pt states that she does not want to do any more radiation therapy. Would like to continue in surveillance at this time. Aware of appt with Dr. Lipscomb on 5/19. Family will contact navigation if any assistance is needed.    Navigation Assessment:  The following were identified as potential barriers to care:   1. Transportation insecurity.     Interventions and Plan:  As above.    Goal of next outreach: Check plan of care after uro onc visit  Time Spent: 30 minutes

## 2025-05-07 NOTE — TELEPHONE ENCOUNTER
Called pts son, Zev, to inform him that pts BG is over 400. Per Dr. Gardiner, she needs to be taking her insulin. Zev VU and informed me that he will let her know and get her to take her insulin.

## 2025-05-07 NOTE — PROGRESS NOTES
Outpatient Palliative Care at the  [facility]: Office Visit New Patient H & P    Diagnosis: Muscle invasive urothelial carcinoma    Treatment Plan: Gemcitabine + XRT (completed 25/28 fx)    Treatment Intent: Curative    Medical Oncologist: Dr. Gardiner    Radiation Oncologist: Dr. Kennedy    Navigator: JACQUELYN Lange RN      Chief Complaint:    Chief Complaint   Patient presents with    Referral - General         History of Present Illness:  Ms. Castillo is a 79 y.o. female who presents today for evaluation regarding meeting with PC in the setting of consideratio of stopping treatment for urothelial cancer. Briefly, patient was recently diagnosed with muscle invasive urothelial carcinoma. Treatment was in hopes to be curative, as she had no evidence of metastatic disease. Pt was able to complete 25/28 fractions for radiation and some gemcitabine infusion. However, in the last 1-2 weeks, patient was not coming to treatments due to weakness and fatigue. She is here today with Dr. Gardiner to discuss stopping treatment.     PMHx includes DM, HTN, current tobacco use and HLD. She lives alone, but family lives very close by. She is .     Pt seen after oncology OV. She is very adamant that she will not be continuing treatment. She is at peace with this decision and accepts that her cancer can return and eventually take her life. She notes she is ready to meet Sang and see her  again. At this time, she is not wanting to consider further treatments, including immunotherapy. She is open to meeting with Dr. Lipscomb for stent exchange. She is also undecided if she wants repeat imaging. She denies pain. Her main concern is severe fatigue, but notes she has felt improvement in the last two weeks since holding treatment, which her son confirms.     Review of Systems:  Review of Systems   Constitutional:  Positive for fatigue.   Neurological:  Positive for weakness.   All other systems reviewed and are

## 2025-05-08 NOTE — PERIOP NOTE
Son's phone number only number listed.  Advanced Directives in EHR.  Zev, son stated that he did not know the patient's medications and provided a number to call the patient [phone];  Order for consent NOT found in EHR at time of PAT visit. Unable to verify case posting against order; surgery verified by patient.    Type 1B surgery, phone assessment complete.  Orders not received.  Labs per surgeon: none ordered  Labs per anesthesia protocol: SQBS, POC K+ s/h for DOS.  5/7/25 K+ 4.7, Hgb 11.0    Patient was rude and refused to answer assessment questions.  The patient stated \"I said, I ain't taking anything\" while attempting to review medications.  Later the patient stated \"I'm taking the blood pressure pills.\"  Patient stated \"the patch came off my arm, so I can't monitor my blood sugar, I I'm not taking any insulin.\" Patient stated \"I would smoke, but I can't get to the store to buy cigarettes.\"     Patient instructed to continue taking all prescription medications up to the day of surgery but to take only the following medications the day of surgery according to anesthesia guidelines with a small sip of water: amlodipine.       Patient informed that all vitamins and supplements should be held 7 days prior to surgery and NSAIDS (preventative aspirin 81 mg) 5 days prior to surgery. Prescription meds to hold: none    Patient instructed on the following:  > Bring albuterol inhaler to the hospital  > Arrive at main Entrance, time of arrival to be called the day before by 1700  > No food after midnight, patient may drink clear liquids up until 2 hours prior to arrival. No gum, candy, mints.   > Responsible adult must drive patient to the hospital, stay during surgery, and patient will need supervision 24 hours after anesthesia  > Use non moisturizing soap in shower the night before surgery and on the morning of surgery  > All piercings must be removed prior to arrival.    > Leave all valuables (money and

## 2025-05-13 NOTE — PROGRESS NOTES
associated with type 2 diabetes mellitus (HCC) 10/4/2017    Elevated liver function tests 10/8/2015    GERD (gastroesophageal reflux disease)     daily med, 2 pillows,    Heart murmur     states dx \"when I was 21 yrs old\"-- not heard since- denies chest pain or syncope    History of blood transfusion 01/2025    HTN (hypertension)     managed  with med    Hyperlipidemia     Obesity (BMI 30-39.9)     BMI 32.9    GHADA (obstructive sleep apnea)     \"I used to use a CPAP a long time ago, but I can't tolerate it\"    Smoking     1 pk/day x 50+ years \"Trying to quit\"    Type 2 diabetes mellitus (HCC)     hgba1c 7.2 on 12/25/2024 no hypo symptoms , wears CGM- not wearing at present    Urothelial cancer (HCC)     Dx 2024    Vitamin B12 deficiency 11/22/2016    Vitamin D deficiency 6/30/2015       MEDs:     albuterol sulfate HFA  amLODIPine  ascorbic acid  aspirin  Dexcom G6  Jolly  Dexcom G6 Sensor Misc  Dexcom G6 Transmitter Misc  famotidine  ferrous sulfate  fosinopril  FreeStyle Sonia 2 [place]vine Jolly  FreeStyle Sonia 2 Sensor Misc  glucose monitoring  Gvoke HypoPen 2-Pack Soaj  Insulin Pen Needle Misc  Lancets Misc  Lantus SoloStar Sopn  lidocaine-prilocaine  nicotine  NovoLOG FlexPen Sopn  ondansetron Tbdp  oxyBUTYnin  rosuvastatin  triamterene-hydroCHLOROthiazide     ALLERGIES:    No Known Allergies    ROS:     Review of Systems   Constitutional: Negative.  Negative for chills, fatigue and fever.   Respiratory: Negative.     Cardiovascular: Negative.    Gastrointestinal: Negative.    Genitourinary: Negative.  Negative for difficulty urinating, dysuria, flank pain, frequency, hematuria and urgency.   Musculoskeletal: Negative.    All other systems reviewed and are negative.       PHYSICAL EXAMINATION    BP (!) 160/69   Pulse 80   Resp 18   Ht 1.499 m (4' 11\")   Wt 49 kg (108 lb)   SpO2 97%   BMI 21.81 kg/m²   General: well dressed, well nourished, no acute distress  Skin: no rashes  HEENT: Sclera are

## 2025-05-15 ENCOUNTER — PREP FOR PROCEDURE (OUTPATIENT)
Age: 80
End: 2025-05-15

## 2025-05-19 ENCOUNTER — OFFICE VISIT (OUTPATIENT)
Age: 80
End: 2025-05-19
Payer: MEDICARE

## 2025-05-19 ENCOUNTER — HOSPITAL ENCOUNTER (OUTPATIENT)
Dept: LAB | Age: 80
Discharge: HOME | End: 2025-05-19
Payer: MEDICARE

## 2025-05-19 ENCOUNTER — CLINICAL DOCUMENTATION (OUTPATIENT)
Dept: CASE MANAGEMENT | Age: 80
End: 2025-05-19

## 2025-05-19 VITALS
BODY MASS INDEX: 21.77 KG/M2 | OXYGEN SATURATION: 97 % | HEART RATE: 80 BPM | HEIGHT: 59 IN | RESPIRATION RATE: 18 BRPM | DIASTOLIC BLOOD PRESSURE: 69 MMHG | WEIGHT: 108 LBS | SYSTOLIC BLOOD PRESSURE: 160 MMHG

## 2025-05-19 DIAGNOSIS — C67.9 UROTHELIAL CARCINOMA OF BLADDER WITH INVASION OF MUSCLE (HCC): Primary | ICD-10-CM

## 2025-05-19 DIAGNOSIS — C67.9 UROTHELIAL CARCINOMA OF BLADDER WITH INVASION OF MUSCLE (HCC): ICD-10-CM

## 2025-05-19 LAB
APPEARANCE UR: ABNORMAL
BACTERIA URNS QL MICRO: NORMAL /HPF
BILIRUB UR QL: NEGATIVE
COLOR UR: YELLOW
EPI CELLS #/AREA URNS HPF: 0 /HPF
GLUCOSE UR STRIP.AUTO-MCNC: NEGATIVE MG/DL
HGB UR QL STRIP: ABNORMAL
KETONES UR QL STRIP.AUTO: NEGATIVE MG/DL
LEUKOCYTE ESTERASE UR QL STRIP.AUTO: ABNORMAL
MUCOUS THREADS URNS QL MICRO: 0 /LPF
NITRITE UR QL STRIP.AUTO: NEGATIVE
PH UR STRIP: 5.5 (ref 5–9)
PROT UR STRIP-MCNC: >300 MG/DL
RBC #/AREA URNS HPF: NORMAL /HPF
SP GR UR REFRACTOMETRY: 1.02 (ref 1–1.02)
UROBILINOGEN UR QL STRIP.AUTO: 0.2 EU/DL (ref 0.2–1)
WBC URNS QL MICRO: >100 /HPF

## 2025-05-19 PROCEDURE — 3077F SYST BP >= 140 MM HG: CPT | Performed by: UROLOGY

## 2025-05-19 PROCEDURE — 81001 URINALYSIS AUTO W/SCOPE: CPT

## 2025-05-19 PROCEDURE — 3078F DIAST BP <80 MM HG: CPT | Performed by: UROLOGY

## 2025-05-19 PROCEDURE — 1123F ACP DISCUSS/DSCN MKR DOCD: CPT | Performed by: UROLOGY

## 2025-05-19 PROCEDURE — 87086 URINE CULTURE/COLONY COUNT: CPT

## 2025-05-19 PROCEDURE — 1125F AMNT PAIN NOTED PAIN PRSNT: CPT | Performed by: UROLOGY

## 2025-05-19 PROCEDURE — 1159F MED LIST DOCD IN RCRD: CPT | Performed by: UROLOGY

## 2025-05-19 PROCEDURE — 1090F PRES/ABSN URINE INCON ASSESS: CPT | Performed by: UROLOGY

## 2025-05-19 PROCEDURE — 1160F RVW MEDS BY RX/DR IN RCRD: CPT | Performed by: UROLOGY

## 2025-05-19 PROCEDURE — G8420 CALC BMI NORM PARAMETERS: HCPCS | Performed by: UROLOGY

## 2025-05-19 PROCEDURE — G8399 PT W/DXA RESULTS DOCUMENT: HCPCS | Performed by: UROLOGY

## 2025-05-19 PROCEDURE — G8427 DOCREV CUR MEDS BY ELIG CLIN: HCPCS | Performed by: UROLOGY

## 2025-05-19 PROCEDURE — 99214 OFFICE O/P EST MOD 30 MIN: CPT | Performed by: UROLOGY

## 2025-05-19 PROCEDURE — 1036F TOBACCO NON-USER: CPT | Performed by: UROLOGY

## 2025-05-19 ASSESSMENT — ENCOUNTER SYMPTOMS
RESPIRATORY NEGATIVE: 1
GASTROINTESTINAL NEGATIVE: 1

## 2025-05-19 NOTE — PATIENT INSTRUCTIONS
Patient Information from Today's Visit    The members of your Oncology Medical Home are listed below:    Physician Provider: Kyler Lipscomb, Urologic Oncologist  Advanced Practice Clinician: SRIKANTH Ghosh  Medical Assistant: Meeta BARROW CMA  :Alisha HANDY  Supportive Care Services: Rick PINEDA LMSW    Diagnosis (Information Sheet Provided on Day of Diagnosis): Urothelial     Follow Up Instructions:   We will get a urine specimen today  Proceed with a cystoscopy with a right ureteral stent exchange possible TURBT (TRIA STENT)  If you need anything prior to the procedure please do not hesitate to call our office.  Has Treatment Plan Been Finalized? No    Current Labs:   No visits with results within 3 Day(s) from this visit.   Latest known visit with results is:   Hospital Outpatient Visit on 05/07/2025   Component Date Value Ref Range Status    WBC 05/07/2025 12.4 (H)  4.3 - 11.1 K/uL Final    RBC 05/07/2025 3.89 (L)  4.05 - 5.2 M/uL Final    Hemoglobin 05/07/2025 11.0 (L)  11.7 - 15.4 g/dL Final    Hematocrit 05/07/2025 36.0  35.8 - 46.3 % Final    MCV 05/07/2025 92.5  82.0 - 102.0 FL Final    MCH 05/07/2025 28.3  26.1 - 32.9 PG Final    MCHC 05/07/2025 30.6 (L)  31.4 - 35.0 g/dL Final    RDW 05/07/2025 23.9 (H)  11.9 - 14.6 % Final    Platelets 05/07/2025 438  150 - 450 K/uL Final    MPV 05/07/2025 9.9  9.4 - 12.3 FL Final    nRBC 05/07/2025 0.00  0.0 - 0.2 K/uL Final    **Note: Absolute NRBC parameter is now reported with Hemogram**    Neutrophils % 05/07/2025 84.5 (H)  43.0 - 78.0 % Final    Lymphocytes % 05/07/2025 4.0 (L)  13.0 - 44.0 % Final    Monocytes % 05/07/2025 8.9  4.0 - 12.0 % Final    Eosinophils % 05/07/2025 0.4 (L)  0.5 - 7.8 % Final    Basophils % 05/07/2025 0.5  0.0 - 2.0 % Final    Immature Granulocytes % 05/07/2025 1.7  0.0 - 5.0 % Final    Neutrophils Absolute 05/07/2025 10.50 (H)  1.70 - 8.20 K/UL Final    Lymphocytes Absolute 05/07/2025 0.50  0.50 - 4.60 K/UL Final    Monocytes

## 2025-05-19 NOTE — PROGRESS NOTES
Nurse Navigation outreach related to care coordination    Other Met with pt and her son Zev while in the clinic to see Dr. Lipscomb. Pt to proceed with cystoscopy with right ureteral stent exchange and possible TURBT on Thursday. Understands to expect a call from the hospital on Wednesday to provide arrival time and NPO instructions.    Navigation Assessment:  The following were identified as potential barriers to care:   1. Transportation insecurity.     Interventions and Plan:  Navigation to follow up with pt/family after procedure.    Goal of next outreach: Status check  Time Spent: 20 minutes

## 2025-05-21 LAB
BACTERIA SPEC CULT: NORMAL
SERVICE CMNT-IMP: NORMAL

## 2025-05-22 ENCOUNTER — HOSPITAL ENCOUNTER (OUTPATIENT)
Age: 80
Setting detail: OUTPATIENT SURGERY
Discharge: HOME | End: 2025-05-22
Attending: UROLOGY | Admitting: UROLOGY
Payer: MEDICARE

## 2025-05-22 ENCOUNTER — ANESTHESIA (OUTPATIENT)
Dept: SURGERY | Age: 80
End: 2025-05-22
Payer: MEDICARE

## 2025-05-22 ENCOUNTER — APPOINTMENT (OUTPATIENT)
Dept: GENERAL RADIOLOGY | Age: 80
End: 2025-05-22
Attending: UROLOGY
Payer: MEDICARE

## 2025-05-22 ENCOUNTER — ANESTHESIA EVENT (OUTPATIENT)
Dept: SURGERY | Age: 80
End: 2025-05-22
Payer: MEDICARE

## 2025-05-22 VITALS
HEART RATE: 70 BPM | HEIGHT: 59 IN | BODY MASS INDEX: 22.18 KG/M2 | WEIGHT: 110 LBS | OXYGEN SATURATION: 98 % | RESPIRATION RATE: 16 BRPM | TEMPERATURE: 97.8 F | SYSTOLIC BLOOD PRESSURE: 177 MMHG | DIASTOLIC BLOOD PRESSURE: 74 MMHG

## 2025-05-22 DIAGNOSIS — C67.9 UROTHELIAL CARCINOMA OF BLADDER WITH INVASION OF MUSCLE (HCC): Primary | ICD-10-CM

## 2025-05-22 LAB
GLUCOSE BLD STRIP.AUTO-MCNC: 197 MG/DL (ref 65–100)
GLUCOSE BLD STRIP.AUTO-MCNC: 221 MG/DL (ref 65–100)
POTASSIUM BLD-SCNC: 4.3 MMOL/L (ref 3.5–5.1)
SERVICE CMNT-IMP: ABNORMAL
SERVICE CMNT-IMP: ABNORMAL

## 2025-05-22 PROCEDURE — 3600000014 HC SURGERY LEVEL 4 ADDTL 15MIN: Performed by: UROLOGY

## 2025-05-22 PROCEDURE — 3700000000 HC ANESTHESIA ATTENDED CARE: Performed by: UROLOGY

## 2025-05-22 PROCEDURE — 3600000004 HC SURGERY LEVEL 4 BASE: Performed by: UROLOGY

## 2025-05-22 PROCEDURE — 2580000003 HC RX 258: Performed by: ANESTHESIOLOGY

## 2025-05-22 PROCEDURE — 7100000011 HC PHASE II RECOVERY - ADDTL 15 MIN: Performed by: UROLOGY

## 2025-05-22 PROCEDURE — 6370000000 HC RX 637 (ALT 250 FOR IP): Performed by: ANESTHESIOLOGY

## 2025-05-22 PROCEDURE — 84132 ASSAY OF SERUM POTASSIUM: CPT

## 2025-05-22 PROCEDURE — 74420 UROGRAPHY RTRGR +-KUB: CPT | Performed by: UROLOGY

## 2025-05-22 PROCEDURE — 7100000001 HC PACU RECOVERY - ADDTL 15 MIN: Performed by: UROLOGY

## 2025-05-22 PROCEDURE — 7100000000 HC PACU RECOVERY - FIRST 15 MIN: Performed by: UROLOGY

## 2025-05-22 PROCEDURE — 6360000002 HC RX W HCPCS: Performed by: NURSE ANESTHETIST, CERTIFIED REGISTERED

## 2025-05-22 PROCEDURE — 52332 CYSTOSCOPY AND TREATMENT: CPT | Performed by: UROLOGY

## 2025-05-22 PROCEDURE — 3700000001 HC ADD 15 MINUTES (ANESTHESIA): Performed by: UROLOGY

## 2025-05-22 PROCEDURE — 6360000004 HC RX CONTRAST MEDICATION: Performed by: UROLOGY

## 2025-05-22 PROCEDURE — 82962 GLUCOSE BLOOD TEST: CPT

## 2025-05-22 PROCEDURE — 7100000010 HC PHASE II RECOVERY - FIRST 15 MIN: Performed by: UROLOGY

## 2025-05-22 PROCEDURE — 6360000002 HC RX W HCPCS: Performed by: UROLOGY

## 2025-05-22 PROCEDURE — 2709999900 HC NON-CHARGEABLE SUPPLY: Performed by: UROLOGY

## 2025-05-22 PROCEDURE — C1758 CATHETER, URETERAL: HCPCS | Performed by: UROLOGY

## 2025-05-22 PROCEDURE — C2617 STENT, NON-COR, TEM W/O DEL: HCPCS | Performed by: UROLOGY

## 2025-05-22 DEVICE — URETERAL STENT WITH SIDE HOLES 6FX26CM
Type: IMPLANTABLE DEVICE | Site: URETER | Status: FUNCTIONAL
Brand: TRIA™ SOFT

## 2025-05-22 RX ORDER — IOPAMIDOL 612 MG/ML
INJECTION, SOLUTION INTRAVASCULAR PRN
Status: DISCONTINUED | OUTPATIENT
Start: 2025-05-22 | End: 2025-05-22 | Stop reason: ALTCHOICE

## 2025-05-22 RX ORDER — SODIUM CHLORIDE 0.9 % (FLUSH) 0.9 %
5-40 SYRINGE (ML) INJECTION PRN
Status: DISCONTINUED | OUTPATIENT
Start: 2025-05-22 | End: 2025-05-22 | Stop reason: HOSPADM

## 2025-05-22 RX ORDER — SODIUM CHLORIDE, SODIUM LACTATE, POTASSIUM CHLORIDE, CALCIUM CHLORIDE 600; 310; 30; 20 MG/100ML; MG/100ML; MG/100ML; MG/100ML
INJECTION, SOLUTION INTRAVENOUS CONTINUOUS
Status: DISCONTINUED | OUTPATIENT
Start: 2025-05-22 | End: 2025-05-22 | Stop reason: HOSPADM

## 2025-05-22 RX ORDER — LIDOCAINE HYDROCHLORIDE 10 MG/ML
1 INJECTION, SOLUTION INFILTRATION; PERINEURAL
Status: DISCONTINUED | OUTPATIENT
Start: 2025-05-22 | End: 2025-05-22 | Stop reason: HOSPADM

## 2025-05-22 RX ORDER — SODIUM CHLORIDE 9 MG/ML
INJECTION, SOLUTION INTRAVENOUS PRN
Status: DISCONTINUED | OUTPATIENT
Start: 2025-05-22 | End: 2025-05-22 | Stop reason: HOSPADM

## 2025-05-22 RX ORDER — OXYCODONE HYDROCHLORIDE 5 MG/1
5 TABLET ORAL
Status: DISCONTINUED | OUTPATIENT
Start: 2025-05-22 | End: 2025-05-22 | Stop reason: HOSPADM

## 2025-05-22 RX ORDER — PROPOFOL 10 MG/ML
INJECTION, EMULSION INTRAVENOUS
Status: DISCONTINUED | OUTPATIENT
Start: 2025-05-22 | End: 2025-05-22 | Stop reason: SDUPTHER

## 2025-05-22 RX ORDER — NALOXONE HYDROCHLORIDE 0.4 MG/ML
INJECTION, SOLUTION INTRAMUSCULAR; INTRAVENOUS; SUBCUTANEOUS PRN
Status: DISCONTINUED | OUTPATIENT
Start: 2025-05-22 | End: 2025-05-22 | Stop reason: HOSPADM

## 2025-05-22 RX ORDER — PROCHLORPERAZINE EDISYLATE 5 MG/ML
5 INJECTION INTRAMUSCULAR; INTRAVENOUS
Status: DISCONTINUED | OUTPATIENT
Start: 2025-05-22 | End: 2025-05-22 | Stop reason: HOSPADM

## 2025-05-22 RX ORDER — SODIUM CHLORIDE 0.9 % (FLUSH) 0.9 %
5-40 SYRINGE (ML) INJECTION EVERY 12 HOURS SCHEDULED
Status: DISCONTINUED | OUTPATIENT
Start: 2025-05-22 | End: 2025-05-22 | Stop reason: HOSPADM

## 2025-05-22 RX ORDER — LIDOCAINE HYDROCHLORIDE 20 MG/ML
INJECTION, SOLUTION EPIDURAL; INFILTRATION; INTRACAUDAL; PERINEURAL
Status: DISCONTINUED | OUTPATIENT
Start: 2025-05-22 | End: 2025-05-22 | Stop reason: SDUPTHER

## 2025-05-22 RX ORDER — INSULIN LISPRO 100 [IU]/ML
3 INJECTION, SOLUTION INTRAVENOUS; SUBCUTANEOUS ONCE
Status: COMPLETED | OUTPATIENT
Start: 2025-05-22 | End: 2025-05-22

## 2025-05-22 RX ORDER — DEXAMETHASONE SODIUM PHOSPHATE 10 MG/ML
INJECTION, SOLUTION INTRA-ARTICULAR; INTRALESIONAL; INTRAMUSCULAR; INTRAVENOUS; SOFT TISSUE
Status: DISCONTINUED | OUTPATIENT
Start: 2025-05-22 | End: 2025-05-22 | Stop reason: SDUPTHER

## 2025-05-22 RX ORDER — ONDANSETRON 2 MG/ML
4 INJECTION INTRAMUSCULAR; INTRAVENOUS
Status: DISCONTINUED | OUTPATIENT
Start: 2025-05-22 | End: 2025-05-22 | Stop reason: HOSPADM

## 2025-05-22 RX ORDER — ONDANSETRON 2 MG/ML
INJECTION INTRAMUSCULAR; INTRAVENOUS
Status: DISCONTINUED | OUTPATIENT
Start: 2025-05-22 | End: 2025-05-22 | Stop reason: SDUPTHER

## 2025-05-22 RX ORDER — ACETAMINOPHEN 500 MG
1000 TABLET ORAL ONCE
Status: COMPLETED | OUTPATIENT
Start: 2025-05-22 | End: 2025-05-22

## 2025-05-22 RX ORDER — SULFAMETHOXAZOLE AND TRIMETHOPRIM 800; 160 MG/1; MG/1
1 TABLET ORAL 2 TIMES DAILY
Qty: 6 TABLET | Refills: 0 | Status: SHIPPED | OUTPATIENT
Start: 2025-05-23 | End: 2025-05-26

## 2025-05-22 RX ADMIN — SODIUM CHLORIDE, SODIUM LACTATE, POTASSIUM CHLORIDE, AND CALCIUM CHLORIDE: 600; 310; 30; 20 INJECTION, SOLUTION INTRAVENOUS at 11:23

## 2025-05-22 RX ADMIN — PHENYLEPHRINE HYDROCHLORIDE 150 MCG: 0.1 INJECTION, SOLUTION INTRAVENOUS at 14:57

## 2025-05-22 RX ADMIN — Medication 2000 MG: at 14:59

## 2025-05-22 RX ADMIN — SODIUM CHLORIDE, SODIUM LACTATE, POTASSIUM CHLORIDE, AND CALCIUM CHLORIDE: 600; 310; 30; 20 INJECTION, SOLUTION INTRAVENOUS at 14:44

## 2025-05-22 RX ADMIN — INSULIN HUMAN 3 UNITS: 100 INJECTION, SOLUTION PARENTERAL at 11:38

## 2025-05-22 RX ADMIN — PHENYLEPHRINE HYDROCHLORIDE 150 MCG: 0.1 INJECTION, SOLUTION INTRAVENOUS at 15:18

## 2025-05-22 RX ADMIN — DEXAMETHASONE SODIUM PHOSPHATE 4 MG: 10 INJECTION INTRAMUSCULAR; INTRAVENOUS at 15:10

## 2025-05-22 RX ADMIN — INSULIN LISPRO 3 UNITS: 100 INJECTION, SOLUTION INTRAVENOUS; SUBCUTANEOUS at 11:37

## 2025-05-22 RX ADMIN — LIDOCAINE HYDROCHLORIDE 60 MG: 20 INJECTION, SOLUTION EPIDURAL; INFILTRATION; INTRACAUDAL; PERINEURAL at 14:54

## 2025-05-22 RX ADMIN — ACETAMINOPHEN 1000 MG: 500 TABLET, FILM COATED ORAL at 11:27

## 2025-05-22 RX ADMIN — ONDANSETRON 4 MG: 2 INJECTION, SOLUTION INTRAMUSCULAR; INTRAVENOUS at 15:10

## 2025-05-22 RX ADMIN — PROPOFOL 150 MG: 10 INJECTION, EMULSION INTRAVENOUS at 14:54

## 2025-05-22 RX ADMIN — PHENYLEPHRINE HYDROCHLORIDE 150 MCG: 0.1 INJECTION, SOLUTION INTRAVENOUS at 15:01

## 2025-05-22 ASSESSMENT — PAIN - FUNCTIONAL ASSESSMENT
PAIN_FUNCTIONAL_ASSESSMENT: 0-10
PAIN_FUNCTIONAL_ASSESSMENT: NONE - DENIES PAIN

## 2025-05-22 ASSESSMENT — LIFESTYLE VARIABLES: SMOKING_STATUS: 1

## 2025-05-22 ASSESSMENT — COPD QUESTIONNAIRES: CAT_SEVERITY: MILD

## 2025-05-22 NOTE — OP NOTE
[address]  [address]                            OPERATIVE REPORT      PATIENT NAME: ERASTO HILLMAN             : 1945  MED REC NO: [id number]                       [place] [facility] [place]  ACCOUNT NO: [id number]                       ADMIT DATE: 2025  PROVIDER: Kyler Lipscomb MD    DATE OF SERVICE:  2025    PREOPERATIVE DIAGNOSES:  Muscle invasive urothelial cancer of the bladder with right ureteral obstruction.    POSTOPERATIVE DIAGNOSES:  Muscle invasive urothelial cancer of the bladder with right ureteral obstruction.    PROCEDURES PERFORMED:  Cystoscopy with exchange of a right ureteral stent and retrograde pyelogram.    SURGEON:  Kyler Lipscomb MD    ASSISTANT:  Stefany Obando.    ANESTHESIA:  General with LMA.    ESTIMATED BLOOD LOSS:  minimal    SPECIMENS REMOVED:  None.    INTRAOPERATIVE FINDINGS:  Her bladder looks surprisingly good.  There was a well-healing TUR scar on the trigone and over towards the right sidewall.  I saw nothing that appeared to be residual cancer.  There was some mild erythema in the mid-cephalad bladder that likely appeared to be some mild radiation cystitis.     COMPLICATIONS:  None.    IMPLANTS:  none    INDICATIONS:  The patient is a pleasant 79-year-old female with muscle invasive bladder cancer.  She attempted to undergo combined chemotherapy and radiation treatments to her bladder, but had to stop about long-term through because of fatigue and overall debilitation.  She had a percutaneous nephrostomy tube that was internalized several months ago and is here today for exchange of that stent.    DESCRIPTION OF PROCEDURE:  After informed consent was obtained, she was taken operating room #3 in [facility].  After induction of general anesthesia and placement of LMA, she was carefully positioned in low lithotomy position.  Genitalia were prepped and draped in the usual sterile  fashion.  Time-out was performed.  She was given Ancef as prophylactic antibiotic.  I then inserted a 22-Persian rigid cystoscope and inspected the entire bladder.  Findings were described above.  There was no evidence of residual or recurrent tumor.    I then used stent graspers to grasp the stent from the right ureter and pulled it back to the meatus.  I then easily placed a Sensor wire through that stent and removed the stent.  Next, I placed a 5-Persian open-ended catheter over the wire and removed the wire.  I then obtained a right retrograde pyelogram.    There was significant dilatation of the entire right ureter with significant hydronephrosis of the upper collecting system.  There were no filling defects or extravasation of contrast.    I then replaced the Sensor guidewire and removed the open-ended catheter.  The wire was then back-loaded onto the cystoscope and I placed a 6-Persian x 26 cm Tria stent with a good curl in the bladder as well as the renal pelvis.  This was done with cystoscopic and fluoroscopic guidance.  I then completely emptied the bladder and removed the cystoscope.  The patient was awakened, LMA removed, taken to the recovery room in stable condition.  No known complications.    DISPOSITION:  She will be discharged to home on 3 days of Bactrim.  I would like to see her back in 4 months with a BMP.  She will continue to follow with Medical-Oncology, who will order her surveillance imaging.  Depending on how she does, we will likely arrange cystoscopy with stent exchange in approximately 5 to 6 months from now.        MONIKA KEN MD      WTL/AQS  D:  05/22/2025 15:46:40  T:  05/22/2025 16:15:41  JOB #:  [id number]

## 2025-05-22 NOTE — BRIEF OP NOTE
Brief Postoperative Note      Patient: Maria Alejandra Castillo  YOB: 1945  MRN: [id number]    Date of Procedure: 5/22/2025    Pre-Op Diagnosis Codes:      * Urothelial cancer (HCC) [C68.9] of the bladder and right ureteral obstruction    Post-Op Diagnosis: Same       Procedure(s):  CYSTOSCOPY TRANSURETHRAL RESECTION BLADDER    Surgeon(s):  Kyler Lipscomb MD    Assistant:  * No surgical staff found *    Anesthesia: General    Estimated Blood Loss (mL): Minimal    Complications: None    Specimens:   * No specimens in log *    Implants:  * No implants in log *      Drains:   Nephrostomy Tube Right Flank 10 fr (Active)       Findings:  Infection Present At Time Of Surgery (PATOS) (choose all levels that have infection present):  No infection present  Other Findings: No evidence of recurrent tumor in the bladder.  The right stent was exchanged for a 6 x 26 Tria.    Electronically signed by Kyler Lipscomb MD on 5/22/2025 at 3:30 PM

## 2025-05-22 NOTE — ANESTHESIA PRE PROCEDURE
DM Type 2 insulin requiring.  Patient not taking: Reported on 1/24/2025 11/14/22   Saba Meza APRN - CNP   Continuous Blood Gluc Sensor (DEXCOM G6 SENSOR) MISC For uncontrolled Diabetes Type 2 insulin requiring.  Patient not taking: Reported on 1/24/2025 11/14/22   Saba Meza APRN - CNP   insulin glargine (LANTUS SOLOSTAR) 100 UNIT/ML injection pen Inject 16 Units into the skin nightly Increase by 1 unit nightly until fasting morning blood sugar reaches <150 and stay at that dose up to maximum daily dose 20 units.  Patient not taking: Reported on 5/19/2025 11/3/22   Saba Meza APRN - CNP   insulin aspart (NOVOLOG FLEXPEN) 100 UNIT/ML injection pen Correction Scale three times daily SQ before meals for fasting glucose:   Less than 150 =   0 units      150 -199 =   2 units  200 -249 = 4 units  250 -299 = 6 units 300 -349 =  8 units 350 and above = 10 units and Call MD Max daily dose 30 units. Initiate hypoglycemia protocol if blood sugar is <70  Patient not taking: Reported on 5/19/2025 11/3/22   Saba Meza APRN - CNP   Continuous Blood Gluc Sensor (FREESTYLE LES 2 SENSOR) MISC For diabetes Type 2 uncontrolled glucose monitoring.  Patient not taking: Reported on 5/19/2025 11/3/22   Saba Meza APRN - CNP   Insulin Pen Needle 32G X 6 MM MISC For insulin injections up to 4 times a day.  Patient not taking: Reported on 1/24/2025 11/3/22   Saba Meza APRN - CNP   Continuous Blood Gluc  (FREESTYLE LES 2 READER) NICKY For diabetes Type 2 uncontrolled glucose monitoring. 11/3/22   Saba Meza APRN - CNP   ascorbic acid (VITAMIN C) 250 MG tablet Take 1 tablet by mouth daily  Patient not taking: Reported on 5/19/2025    Provider, MD [employer]   albuterol sulfate HFA (PROVENTIL;VENTOLIN;PROAIR) 108 (90 Base) MCG/ACT inhaler Inhale 1 puff into the lungs every 6 hours as needed for Wheezing or Shortness of Breath  Patient not taking: Reported on 5/19/2025 10/3/22   Saba Meza APRN - CNP

## 2025-05-22 NOTE — DISCHARGE INSTRUCTIONS
If you have had surgery in the past 7-10 days by one of our providers and are having fever, bleeding, or drainage from an incision, have an opening in an incision, or having issues urinating properly, please call [phone] during normal office hours. Please call [phone] for any immediate needs AFTER HOURS.     Ureteral Stent Placement: What to Expect at Home    Your Recovery  A ureteral (say \"you-REE-ter-ul\") stent is a thin, hollow tube that is placed in the ureter to help urine pass from the kidney into the bladder. Ureters are the tubes that connect the kidneys to the bladder.  You may have a small amount of blood in your urine for 1 to 3 days after the procedure.  While the stent is in place, you may have to urinate more often, feel a sudden need to urinate, or feel like you cannot completely empty your bladder. You may feel some pain when you urinate or do strenuous activity. You also may notice a small amount of blood in your urine after strenuous activities. These side effects usually do not prevent people from doing their normal daily activities. You may have a string attached to your stent. Do not to pull the string unless the doctor tells you to. The doctor will use the string to pull out the stent when you no longer need it.  After the procedure, urine may flow better from your kidneys to your bladder. A ureteral stent may be left in place for several days or for as long as several months. Your doctor will take it out when you no longer need it.    Cystoscopy: What to Expect at Home    Your Recovery  A cystoscopy is a procedure that lets a doctor look inside of the bladder and the urethra. The urethra is the tube that carries urine from the bladder to outside the body. The doctor uses a thin, lighted tube called a cystoscope to look for kidney or bladder stones, tumors, bleeding, or infection. After the cystoscopy, your urethra may be sore at first, and it may burn when you urinate for the first  list of the medicines you take.    When should you call for help?    Call 911 anytime you think you may need emergency care. For example, call if:  You passed out (lost consciousness).  You have severe trouble breathing.  You have sudden chest pain and shortness of breath, or you cough up blood.  You have severe belly pain.    Call your doctor now or seek immediate medical care if:  You are sick to your stomach or cannot keep fluids down.  Your urine is still red or you see blood clots after you have urinated several times.  You have trouble passing urine or stool, especially if you have pain or swelling in your lower belly.  You have signs of a blood clot, such as:  Pain in your calf, back of the knee, thigh, or groin.  Redness and swelling in your leg or groin.  You develop a fever or severe chills.  You have pain in your back just below your rib cage. This is called flank pain.    Watch closely for changes in your health, and be sure to contact your doctor if:  A burning feeling is normal for a day or two after the test, but call if it does not get better.  You have a frequent urge to urinate but can pass only small amounts of urine.   It is normal for the urine to have a pinkish color for a few days after the test, but call if it does not get better or if your urine is cloudy, smells bad, or has pus in it.    After general anesthesia or intravenous sedation, for 24 hours or while taking prescription Narcotics:  Limit your activities  A responsible adult needs to be with you for the next 24 hours  Do not drive and operate hazardous machinery  Do not make important personal or business decisions  Do not drink alcoholic beverages  If you have not urinated within 8 hours after discharge, and you are experiencing discomfort from urinary retention, please go to the nearest ED.  If you have sleep apnea and have a CPAP machine, please use it for all naps and sleeping.  Please use caution when taking narcotics and any of

## 2025-05-22 NOTE — ANESTHESIA POSTPROCEDURE EVALUATION
Department of Anesthesiology  Postprocedure Note    Patient: Maria Alejandra Castillo  MRN: [id number]  YOB: 1945  Date of evaluation: 5/22/2025    Procedure Summary       Date: 05/22/25 Room / Location: [facility] MAIN OR 03 / [facility] MAIN OR    Anesthesia Start: 1444 Anesthesia Stop: 1534    Procedure: CYSTOSCOPY/retrograde/ureteral stent exchange (Ureter) Diagnosis:       Urothelial cancer (HCC)      (Urothelial cancer (HCC) [C68.9])    Providers: Kyler Lipscomb MD Responsible Provider: Alex Sampson MD    Anesthesia Type: General ASA Status: 3            Anesthesia Type: General    Juan Antonio Phase I: Juan Antonio Score: 10    Juan Antonio Phase II: Juan Antonio Score: 10    Anesthesia Post Evaluation    Patient location during evaluation: PACU  Patient participation: complete - patient participated  Level of consciousness: awake and alert  Airway patency: patent  Nausea & Vomiting: no nausea and no vomiting  Cardiovascular status: hemodynamically stable  Respiratory status: acceptable, nonlabored ventilation and spontaneous ventilation  Hydration status: euvolemic  Comments: BP (!) 177/74   Pulse 70   Temp 97.8 °F (36.6 °C) (Tympanic)   Resp 16   Ht 1.499 m (4' 11\")   Wt 49.9 kg (110 lb)   SpO2 98%   BMI 22.22 kg/m²     Multimodal analgesia pain management approach  Pain management: adequate and satisfactory to patient    No notable events documented.

## (undated) DEVICE — BAG DRAIN UROLOGY GENESIS NS

## (undated) DEVICE — SUTURE PERMAHAND SZ 3-0 L18IN NONABSORBABLE BLK SILK BRAID A184H

## (undated) DEVICE — ELECTRODE PT RET AD L9FT HI MOIST COND ADH HYDRGEL CORDED

## (undated) DEVICE — SUTURE VCRL SZ 2-0 L36IN ABSRB UD L36MM CT-1 1/2 CIR J945H

## (undated) DEVICE — TURP TURB: Brand: MEDLINE INDUSTRIES, INC.

## (undated) DEVICE — SINGLE BASIN: Brand: CARDINAL HEALTH

## (undated) DEVICE — SYR 10ML LUER LOK 1/5ML GRAD --

## (undated) DEVICE — Device

## (undated) DEVICE — PAD,NON-ADHERENT,3X8,STERILE,LF,1/PK: Brand: MEDLINE

## (undated) DEVICE — INTENDED FOR TISSUE SEPARATION, AND OTHER PROCEDURES THAT REQUIRE A SHARP SURGICAL BLADE TO PUNCTURE OR CUT.: Brand: BARD-PARKER ® STAINLESS STEEL BLADES

## (undated) DEVICE — TAPE UMB 1/8X30IN MP COT WHT --

## (undated) DEVICE — CATHETER URET 5FR L70CM OPN END SGL LUMN INJ HUB FLEXIMA

## (undated) DEVICE — SUTURE PERMAHAND SZ 2-0 L12X18IN NONABSORBABLE BLK SILK A185H

## (undated) DEVICE — CATHETER F BLLN 5CC 20FR INF CTRL 2 W SIL ALLY AND HYDRGEL

## (undated) DEVICE — PLUG CATH L NYL FOR F

## (undated) DEVICE — SYRINGE,TOOMEY,IRRIGATION,70CC,STERILE: Brand: MEDLINE

## (undated) DEVICE — SUTURE VCRL SZ 3-0 L27IN ABSRB UD L26MM SH 1/2 CIR J416H

## (undated) DEVICE — SOLUTION IRRIG 3000ML H2O STRL BAG

## (undated) DEVICE — SYRINGE MED 30ML STD CLR PLAS LUERLOCK TIP N CTRL DISP

## (undated) DEVICE — PVC URETHRAL CATHETER: Brand: DOVER

## (undated) DEVICE — KENDALL SCD EXPRESS SLEEVES, KNEE LENGTH, MEDIUM: Brand: KENDALL SCD

## (undated) DEVICE — (D)PREP SKN CHLRAPRP APPL 26ML -- CONVERT TO ITEM 371833

## (undated) DEVICE — GOWN,REINFORCED,POLY,AURORA,XXLARGE,STR: Brand: MEDLINE

## (undated) DEVICE — CATHETER URETH 24FR BLLN 30CC STD LTX 3 W TWO OPP DRNGE EYE

## (undated) DEVICE — SUTURE PROL SZ 6-0 L24IN NONABSORBABLE BLU L13MM C-1 3/8 8726H

## (undated) DEVICE — SOLUTION IV 1000ML 0.9% SOD CHL

## (undated) DEVICE — GLOVE SURG SZ 75 L12IN FNGR THK79MIL GRN LTX FREE

## (undated) DEVICE — DEVICE STBL AD TRICOT ANCHR PD FOR 3 W F CATH STATLOK

## (undated) DEVICE — SKIN PREP TRAY 4 COMPARTM TRAY: Brand: MEDLINE INDUSTRIES, INC.

## (undated) DEVICE — ELECTRODE,COAG,STERILE,BALL END: Brand: N.A.

## (undated) DEVICE — AGENT HEMSTAT W4XL4IN OXIDIZED REGENERATED CELOS ABSRB SFT

## (undated) DEVICE — MAGNETIC DRAPE: Brand: DEVON

## (undated) DEVICE — SUTURE MCRYL SZ 4-0 L27IN ABSRB UD L19MM PS-2 1/2 CIR PRIM Y426H

## (undated) DEVICE — STANDARD HYPODERMIC NEEDLE,POLYPROPYLENE HUB: Brand: MONOJECT

## (undated) DEVICE — APPLICATOR BNDG 1MM ADH PREMIERPRO EXOFIN

## (undated) DEVICE — STERILE LATEX POWDER FREE SURGICAL GLOVES WITH HYDROGEL COATING: Brand: PROTEXIS

## (undated) DEVICE — SPONGE LAP 18X18IN STRL -- 5/PK

## (undated) DEVICE — ELECTRODE,CUTTING,STERILE.24FR: Brand: N.A.

## (undated) DEVICE — INTENDED TO BE USED TO OCCLUDE, RETRACT AND IDENTIFY ARTERIES, VEINS, TENDONS AND NERVES IN SURGICAL PROCEDURES: Brand: STERION®  VESSEL LOOP

## (undated) DEVICE — NITINOL STONE RETRIEVAL DEVICE: Brand: DAKOTA

## (undated) DEVICE — Y-TYPE TUR/BLADDER IRRIGATION SET, REGULATING CLAMP

## (undated) DEVICE — GARMENT,MEDLINE,DVT,INT,CALF,MED, GEN2: Brand: MEDLINE

## (undated) DEVICE — CARDINAL HEALTH FLEXIBLE LIGHT HANDLE COVER: Brand: CARDINAL HEALTH

## (undated) DEVICE — CATHETER F BLLN 5CC 22FR 2 W HYDRGEL COAT LESS TRAUM LUB

## (undated) DEVICE — SUTURE PROL SZ 6-0 L24IN NONABSORBABLE BLU BV-1 L9.3MM 3/8 M8805

## (undated) DEVICE — APPLIER CLP L9.375IN APER 2.1MM CLS L3.8MM 20 SM TI CLP

## (undated) DEVICE — CAROTID ARTERY SHUNT KIT,RADIOPAQUE LINE, STRAIGHT: Brand: ARGYLE

## (undated) DEVICE — SOLUTION IRRIG 1000ML STRL H2O USP PLAS POUR BTL

## (undated) DEVICE — COVER LT HNDL FOR OR SURG LAMP

## (undated) DEVICE — 3M™ IOBAN™ 2 ANTIMICROBIAL INCISE DRAPE 6650EZ: Brand: IOBAN™ 2

## (undated) DEVICE — GEL US 20GM NONIRRITATING OVERWRAPPED FILE PCH TRNSMIT

## (undated) DEVICE — UNIVERSAL DRAPES: Brand: MEDLINE INDUSTRIES, INC.

## (undated) DEVICE — SUTURE NONABSORBABLE MONOFILAMENT 5-0 C-1 1X24 IN PROLENE 8725H

## (undated) DEVICE — BAG,DRAINAGE,4L,A/R TOWER,LL,SLIDE TAP: Brand: MEDLINE

## (undated) DEVICE — DRAPE TWL SURG 16X26IN BLU ORB04] ALLCARE INC]

## (undated) DEVICE — DISH PTRI STRL --

## (undated) DEVICE — 2000CC GUARDIAN II: Brand: GUARDIAN

## (undated) DEVICE — REM POLYHESIVE ADULT PATIENT RETURN ELECTRODE: Brand: VALLEYLAB